# Patient Record
Sex: MALE | Race: WHITE | NOT HISPANIC OR LATINO
[De-identification: names, ages, dates, MRNs, and addresses within clinical notes are randomized per-mention and may not be internally consistent; named-entity substitution may affect disease eponyms.]

---

## 2017-01-04 ENCOUNTER — APPOINTMENT (OUTPATIENT)
Dept: INTERNAL MEDICINE | Facility: CLINIC | Age: 77
End: 2017-01-04

## 2017-03-08 ENCOUNTER — APPOINTMENT (OUTPATIENT)
Dept: ORTHOPEDIC SURGERY | Facility: CLINIC | Age: 77
End: 2017-03-08

## 2017-03-08 VITALS
WEIGHT: 170 LBS | HEIGHT: 69 IN | BODY MASS INDEX: 25.18 KG/M2 | HEART RATE: 66 BPM | DIASTOLIC BLOOD PRESSURE: 95 MMHG | SYSTOLIC BLOOD PRESSURE: 145 MMHG

## 2017-03-08 DIAGNOSIS — M70.62 TROCHANTERIC BURSITIS, LEFT HIP: ICD-10-CM

## 2017-03-09 ENCOUNTER — APPOINTMENT (OUTPATIENT)
Dept: MRI IMAGING | Facility: CLINIC | Age: 77
End: 2017-03-09

## 2017-03-09 ENCOUNTER — OUTPATIENT (OUTPATIENT)
Dept: OUTPATIENT SERVICES | Facility: HOSPITAL | Age: 77
LOS: 1 days | End: 2017-03-09
Payer: MEDICARE

## 2017-03-09 DIAGNOSIS — M48.06 SPINAL STENOSIS, LUMBAR REGION: ICD-10-CM

## 2017-03-09 PROCEDURE — 72148 MRI LUMBAR SPINE W/O DYE: CPT

## 2017-03-13 ENCOUNTER — APPOINTMENT (OUTPATIENT)
Dept: ORTHOPEDIC SURGERY | Facility: CLINIC | Age: 77
End: 2017-03-13

## 2017-03-13 VITALS
DIASTOLIC BLOOD PRESSURE: 88 MMHG | BODY MASS INDEX: 25.18 KG/M2 | HEART RATE: 62 BPM | SYSTOLIC BLOOD PRESSURE: 133 MMHG | HEIGHT: 69 IN | WEIGHT: 170 LBS

## 2017-03-21 ENCOUNTER — APPOINTMENT (OUTPATIENT)
Dept: ORTHOPEDIC SURGERY | Facility: HOSPITAL | Age: 77
End: 2017-03-21

## 2017-03-21 ENCOUNTER — OUTPATIENT (OUTPATIENT)
Dept: OUTPATIENT SERVICES | Facility: HOSPITAL | Age: 77
LOS: 1 days | End: 2017-03-21
Payer: MEDICARE

## 2017-03-21 DIAGNOSIS — M51.36 OTHER INTERVERTEBRAL DISC DEGENERATION, LUMBAR REGION: ICD-10-CM

## 2017-03-21 PROCEDURE — 77003 FLUOROGUIDE FOR SPINE INJECT: CPT

## 2017-03-21 PROCEDURE — 64483 NJX AA&/STRD TFRM EPI L/S 1: CPT | Mod: LT

## 2017-03-21 PROCEDURE — 64483 NJX AA&/STRD TFRM EPI L/S 1: CPT

## 2017-03-21 PROCEDURE — 64484 NJX AA&/STRD TFRM EPI L/S EA: CPT

## 2017-04-25 ENCOUNTER — APPOINTMENT (OUTPATIENT)
Dept: INTERNAL MEDICINE | Facility: CLINIC | Age: 77
End: 2017-04-25

## 2017-04-25 VITALS
TEMPERATURE: 97.8 F | DIASTOLIC BLOOD PRESSURE: 90 MMHG | WEIGHT: 165 LBS | SYSTOLIC BLOOD PRESSURE: 140 MMHG | BODY MASS INDEX: 24.37 KG/M2

## 2017-04-25 RX ORDER — GABAPENTIN 100 MG/1
100 CAPSULE ORAL
Qty: 30 | Refills: 2 | Status: DISCONTINUED | COMMUNITY
Start: 2017-03-13 | End: 2017-04-25

## 2017-06-05 ENCOUNTER — LABORATORY RESULT (OUTPATIENT)
Age: 77
End: 2017-06-05

## 2017-06-05 ENCOUNTER — APPOINTMENT (OUTPATIENT)
Dept: INTERNAL MEDICINE | Facility: CLINIC | Age: 77
End: 2017-06-05

## 2017-06-05 VITALS — SYSTOLIC BLOOD PRESSURE: 120 MMHG | DIASTOLIC BLOOD PRESSURE: 64 MMHG

## 2017-06-05 VITALS
SYSTOLIC BLOOD PRESSURE: 136 MMHG | HEIGHT: 69 IN | DIASTOLIC BLOOD PRESSURE: 64 MMHG | WEIGHT: 166 LBS | BODY MASS INDEX: 24.59 KG/M2

## 2017-06-06 ENCOUNTER — NON-APPOINTMENT (OUTPATIENT)
Age: 77
End: 2017-06-06

## 2017-07-03 ENCOUNTER — RX RENEWAL (OUTPATIENT)
Age: 77
End: 2017-07-03

## 2017-07-03 ENCOUNTER — MEDICATION RENEWAL (OUTPATIENT)
Age: 77
End: 2017-07-03

## 2017-07-06 LAB
ALBUMIN SERPL ELPH-MCNC: 3.9 G/DL
ALP BLD-CCNC: 42 U/L
ALT SERPL-CCNC: 12 U/L
ANION GAP SERPL CALC-SCNC: 12 MMOL/L
APPEARANCE: CLEAR
AST SERPL-CCNC: 26 U/L
BACTERIA: NEGATIVE
BASOPHILS # BLD AUTO: 0.02 K/UL
BASOPHILS NFR BLD AUTO: 0.3 %
BILIRUB SERPL-MCNC: 0.7 MG/DL
BILIRUBIN URINE: NEGATIVE
BLOOD URINE: NEGATIVE
BUN SERPL-MCNC: 22 MG/DL
CALCIUM SERPL-MCNC: 9.6 MG/DL
CHLORIDE SERPL-SCNC: 106 MMOL/L
CHOLEST SERPL-MCNC: 199 MG/DL
CHOLEST/HDLC SERPL: 2.4 RATIO
CO2 SERPL-SCNC: 23 MMOL/L
COLOR: YELLOW
CREAT SERPL-MCNC: 0.97 MG/DL
EOSINOPHIL # BLD AUTO: 0.14 K/UL
EOSINOPHIL NFR BLD AUTO: 2 %
GLUCOSE QUALITATIVE U: NORMAL MG/DL
GLUCOSE SERPL-MCNC: 90 MG/DL
HCT VFR BLD CALC: 41.6 %
HDLC SERPL-MCNC: 84 MG/DL
HGB BLD-MCNC: 13.2 G/DL
HYALINE CASTS: 0 /LPF
IMM GRANULOCYTES NFR BLD AUTO: 0.1 %
KETONES URINE: NEGATIVE
LDLC SERPL CALC-MCNC: 106 MG/DL
LDLC SERPL DIRECT ASSAY-MCNC: 105 MG/DL
LEUKOCYTE ESTERASE URINE: NEGATIVE
LYMPHOCYTES # BLD AUTO: 3.39 K/UL
LYMPHOCYTES NFR BLD AUTO: 47.3 %
MAN DIFF?: NORMAL
MCHC RBC-ENTMCNC: 28.8 PG
MCHC RBC-ENTMCNC: 31.7 GM/DL
MCV RBC AUTO: 90.6 FL
MICROSCOPIC-UA: NORMAL
MONOCYTES # BLD AUTO: 0.52 K/UL
MONOCYTES NFR BLD AUTO: 7.3 %
NEUTROPHILS # BLD AUTO: 3.08 K/UL
NEUTROPHILS NFR BLD AUTO: 43 %
NITRITE URINE: NEGATIVE
PH URINE: 7
PLATELET # BLD AUTO: 189 K/UL
POTASSIUM SERPL-SCNC: 4.9 MMOL/L
PROT SERPL-MCNC: 6.6 G/DL
PROTEIN URINE: NEGATIVE MG/DL
RBC # BLD: 4.59 M/UL
RBC # FLD: 16 %
RED BLOOD CELLS URINE: 3 /HPF
SODIUM SERPL-SCNC: 141 MMOL/L
SPECIFIC GRAVITY URINE: 1.02
SQUAMOUS EPITHELIAL CELLS: 0 /HPF
T3 SERPL-MCNC: 95 NG/DL
T3RU NFR SERPL: 1.02 INDEX
T4 FREE SERPL-MCNC: 1.3 NG/DL
T4 SERPL-MCNC: 7.1 UG/DL
TRIGL SERPL-MCNC: 44 MG/DL
TSH SERPL-ACNC: 1.42 UIU/ML
UROBILINOGEN URINE: NORMAL MG/DL
WBC # FLD AUTO: 7.16 K/UL
WHITE BLOOD CELLS URINE: 0 /HPF

## 2017-07-12 ENCOUNTER — APPOINTMENT (OUTPATIENT)
Dept: ORTHOPEDIC SURGERY | Facility: CLINIC | Age: 77
End: 2017-07-12

## 2017-07-12 VITALS
SYSTOLIC BLOOD PRESSURE: 121 MMHG | HEART RATE: 61 BPM | BODY MASS INDEX: 24.59 KG/M2 | HEIGHT: 69 IN | WEIGHT: 166 LBS | DIASTOLIC BLOOD PRESSURE: 69 MMHG

## 2017-07-18 ENCOUNTER — APPOINTMENT (OUTPATIENT)
Dept: ORTHOPEDIC SURGERY | Facility: HOSPITAL | Age: 77
End: 2017-07-18

## 2017-07-18 ENCOUNTER — OUTPATIENT (OUTPATIENT)
Dept: OUTPATIENT SERVICES | Facility: HOSPITAL | Age: 77
LOS: 1 days | End: 2017-07-18
Payer: MEDICARE

## 2017-07-18 DIAGNOSIS — M54.16 RADICULOPATHY, LUMBAR REGION: ICD-10-CM

## 2017-07-18 DIAGNOSIS — M48.06 SPINAL STENOSIS, LUMBAR REGION: ICD-10-CM

## 2017-07-18 DIAGNOSIS — M51.36 OTHER INTERVERTEBRAL DISC DEGENERATION, LUMBAR REGION: ICD-10-CM

## 2017-07-18 PROCEDURE — 77003 FLUOROGUIDE FOR SPINE INJECT: CPT

## 2017-07-18 PROCEDURE — 64483 NJX AA&/STRD TFRM EPI L/S 1: CPT | Mod: LT

## 2017-07-18 PROCEDURE — 64484 NJX AA&/STRD TFRM EPI L/S EA: CPT

## 2017-07-18 PROCEDURE — 64484 NJX AA&/STRD TFRM EPI L/S EA: CPT | Mod: LT

## 2017-07-18 PROCEDURE — 64483 NJX AA&/STRD TFRM EPI L/S 1: CPT

## 2017-09-24 ENCOUNTER — MEDICATION RENEWAL (OUTPATIENT)
Age: 77
End: 2017-09-24

## 2017-10-24 ENCOUNTER — APPOINTMENT (OUTPATIENT)
Dept: CARDIOLOGY | Facility: CLINIC | Age: 77
End: 2017-10-24
Payer: MEDICARE

## 2017-10-24 PROCEDURE — G0008: CPT

## 2017-10-24 PROCEDURE — 90662 IIV NO PRSV INCREASED AG IM: CPT

## 2017-12-18 ENCOUNTER — APPOINTMENT (OUTPATIENT)
Dept: INTERNAL MEDICINE | Facility: CLINIC | Age: 77
End: 2017-12-18

## 2017-12-21 ENCOUNTER — APPOINTMENT (OUTPATIENT)
Dept: CARDIOLOGY | Facility: CLINIC | Age: 77
End: 2017-12-21
Payer: MEDICARE

## 2017-12-21 ENCOUNTER — LABORATORY RESULT (OUTPATIENT)
Age: 77
End: 2017-12-21

## 2017-12-21 ENCOUNTER — NON-APPOINTMENT (OUTPATIENT)
Age: 77
End: 2017-12-21

## 2017-12-21 VITALS — SYSTOLIC BLOOD PRESSURE: 124 MMHG | DIASTOLIC BLOOD PRESSURE: 70 MMHG

## 2017-12-21 VITALS
BODY MASS INDEX: 24.37 KG/M2 | WEIGHT: 165 LBS | SYSTOLIC BLOOD PRESSURE: 142 MMHG | DIASTOLIC BLOOD PRESSURE: 78 MMHG | HEART RATE: 53 BPM

## 2017-12-21 VITALS
SYSTOLIC BLOOD PRESSURE: 137 MMHG | BODY MASS INDEX: 24.44 KG/M2 | WEIGHT: 165 LBS | DIASTOLIC BLOOD PRESSURE: 94 MMHG | HEIGHT: 69 IN

## 2017-12-21 PROCEDURE — 99214 OFFICE O/P EST MOD 30 MIN: CPT

## 2017-12-21 PROCEDURE — 93000 ELECTROCARDIOGRAM COMPLETE: CPT

## 2017-12-21 PROCEDURE — 36415 COLL VENOUS BLD VENIPUNCTURE: CPT

## 2017-12-21 PROCEDURE — 93306 TTE W/DOPPLER COMPLETE: CPT

## 2017-12-21 PROCEDURE — 93040 RHYTHM ECG WITH REPORT: CPT | Mod: 59

## 2017-12-25 ENCOUNTER — NON-APPOINTMENT (OUTPATIENT)
Age: 77
End: 2017-12-25

## 2018-01-04 LAB
ALBUMIN SERPL ELPH-MCNC: 3.7 G/DL
ALP BLD-CCNC: 46 U/L
ALT SERPL-CCNC: 20 U/L
ANION GAP SERPL CALC-SCNC: 12 MMOL/L
AST SERPL-CCNC: 29 U/L
BILIRUB SERPL-MCNC: 0.8 MG/DL
BUN SERPL-MCNC: 24 MG/DL
CALCIUM SERPL-MCNC: 9.5 MG/DL
CHLORIDE SERPL-SCNC: 102 MMOL/L
CHOLEST SERPL-MCNC: 183 MG/DL
CHOLEST/HDLC SERPL: 2.2 RATIO
CO2 SERPL-SCNC: 26 MMOL/L
CREAT SERPL-MCNC: 0.98 MG/DL
GLUCOSE SERPL-MCNC: 90 MG/DL
HDLC SERPL-MCNC: 83 MG/DL
LDLC SERPL CALC-MCNC: 92 MG/DL
LDLC SERPL DIRECT ASSAY-MCNC: 106 MG/DL
MAGNESIUM SERPL-MCNC: 1.8 MG/DL
POTASSIUM SERPL-SCNC: 4.5 MMOL/L
PROT SERPL-MCNC: 7 G/DL
SODIUM SERPL-SCNC: 140 MMOL/L
T3 SERPL-MCNC: 91 NG/DL
T3RU NFR SERPL: 1.05 INDEX
T4 FREE SERPL-MCNC: 1.3 NG/DL
T4 SERPL-MCNC: 7.2 UG/DL
TRIGL SERPL-MCNC: 39 MG/DL
TSH SERPL-ACNC: 0.94 UIU/ML

## 2018-01-15 ENCOUNTER — APPOINTMENT (OUTPATIENT)
Dept: CARDIOLOGY | Facility: CLINIC | Age: 78
End: 2018-01-15
Payer: MEDICARE

## 2018-01-15 VITALS — WEIGHT: 166 LBS | TEMPERATURE: 97.1 F | BODY MASS INDEX: 24.51 KG/M2 | OXYGEN SATURATION: 97 % | HEART RATE: 60 BPM

## 2018-01-15 VITALS — DIASTOLIC BLOOD PRESSURE: 60 MMHG | SYSTOLIC BLOOD PRESSURE: 140 MMHG

## 2018-01-15 PROCEDURE — 99213 OFFICE O/P EST LOW 20 MIN: CPT

## 2018-01-18 LAB — BACTERIA THROAT CULT: NORMAL

## 2018-05-07 ENCOUNTER — APPOINTMENT (OUTPATIENT)
Dept: ORTHOPEDIC SURGERY | Facility: CLINIC | Age: 78
End: 2018-05-07
Payer: MEDICARE

## 2018-05-07 VITALS
HEART RATE: 67 BPM | SYSTOLIC BLOOD PRESSURE: 147 MMHG | BODY MASS INDEX: 25.03 KG/M2 | DIASTOLIC BLOOD PRESSURE: 87 MMHG | HEIGHT: 69 IN | WEIGHT: 169 LBS

## 2018-05-07 DIAGNOSIS — M48.061 SPINAL STENOSIS, LUMBAR REGION WITHOUT NEUROGENIC CLAUDICATION: ICD-10-CM

## 2018-05-07 PROCEDURE — 99214 OFFICE O/P EST MOD 30 MIN: CPT

## 2018-05-07 PROCEDURE — 72110 X-RAY EXAM L-2 SPINE 4/>VWS: CPT

## 2018-05-07 PROCEDURE — 72170 X-RAY EXAM OF PELVIS: CPT | Mod: 59

## 2018-05-08 ENCOUNTER — OUTPATIENT (OUTPATIENT)
Dept: OUTPATIENT SERVICES | Facility: HOSPITAL | Age: 78
LOS: 1 days | End: 2018-05-08
Payer: MEDICARE

## 2018-05-08 ENCOUNTER — APPOINTMENT (OUTPATIENT)
Dept: ORTHOPEDIC SURGERY | Facility: HOSPITAL | Age: 78
End: 2018-05-08

## 2018-05-08 DIAGNOSIS — M48.061 SPINAL STENOSIS, LUMBAR REGION WITHOUT NEUROGENIC CLAUDICATION: ICD-10-CM

## 2018-05-08 DIAGNOSIS — M54.16 RADICULOPATHY, LUMBAR REGION: ICD-10-CM

## 2018-05-08 DIAGNOSIS — M51.36 OTHER INTERVERTEBRAL DISC DEGENERATION, LUMBAR REGION: ICD-10-CM

## 2018-05-08 PROCEDURE — 64484 NJX AA&/STRD TFRM EPI L/S EA: CPT

## 2018-05-08 PROCEDURE — 64484 NJX AA&/STRD TFRM EPI L/S EA: CPT | Mod: LT

## 2018-05-08 PROCEDURE — 64483 NJX AA&/STRD TFRM EPI L/S 1: CPT

## 2018-05-08 PROCEDURE — 64483 NJX AA&/STRD TFRM EPI L/S 1: CPT | Mod: LT

## 2018-05-08 PROCEDURE — 77003 FLUOROGUIDE FOR SPINE INJECT: CPT

## 2018-06-18 ENCOUNTER — NON-APPOINTMENT (OUTPATIENT)
Age: 78
End: 2018-06-18

## 2018-06-18 ENCOUNTER — APPOINTMENT (OUTPATIENT)
Dept: CARDIOLOGY | Facility: CLINIC | Age: 78
End: 2018-06-18
Payer: MEDICARE

## 2018-06-18 VITALS
HEART RATE: 54 BPM | SYSTOLIC BLOOD PRESSURE: 140 MMHG | DIASTOLIC BLOOD PRESSURE: 80 MMHG | WEIGHT: 166 LBS | BODY MASS INDEX: 24.51 KG/M2 | OXYGEN SATURATION: 99 %

## 2018-06-18 VITALS — DIASTOLIC BLOOD PRESSURE: 76 MMHG | SYSTOLIC BLOOD PRESSURE: 158 MMHG

## 2018-06-18 VITALS — DIASTOLIC BLOOD PRESSURE: 62 MMHG | SYSTOLIC BLOOD PRESSURE: 156 MMHG

## 2018-06-18 PROCEDURE — 93306 TTE W/DOPPLER COMPLETE: CPT

## 2018-06-18 PROCEDURE — 99214 OFFICE O/P EST MOD 30 MIN: CPT | Mod: 25

## 2018-06-18 PROCEDURE — 36415 COLL VENOUS BLD VENIPUNCTURE: CPT

## 2018-06-18 PROCEDURE — 93000 ELECTROCARDIOGRAM COMPLETE: CPT

## 2018-06-18 PROCEDURE — 93040 RHYTHM ECG WITH REPORT: CPT | Mod: 59

## 2018-06-18 RX ORDER — AMOXICILLIN 500 MG/1
500 CAPSULE ORAL
Qty: 28 | Refills: 0 | Status: COMPLETED | COMMUNITY
Start: 2017-06-20 | End: 2018-06-18

## 2018-06-18 RX ORDER — AMOXICILLIN AND CLAVULANATE POTASSIUM 875; 125 MG/1; MG/1
875-125 TABLET, COATED ORAL
Qty: 14 | Refills: 1 | Status: DISCONTINUED | COMMUNITY
Start: 2017-04-25 | End: 2018-06-18

## 2018-06-18 RX ORDER — AMOXICILLIN AND CLAVULANATE POTASSIUM 875; 125 MG/1; MG/1
875-125 TABLET, COATED ORAL
Qty: 14 | Refills: 1 | Status: DISCONTINUED | COMMUNITY
Start: 2018-01-15 | End: 2018-06-18

## 2018-06-21 ENCOUNTER — NON-APPOINTMENT (OUTPATIENT)
Age: 78
End: 2018-06-21

## 2018-06-22 LAB
ALBUMIN SERPL ELPH-MCNC: 4.4 G/DL
ALP BLD-CCNC: 51 U/L
ALT SERPL-CCNC: 37 U/L
ANION GAP SERPL CALC-SCNC: 20 MMOL/L
AST SERPL-CCNC: 30 U/L
BILIRUB SERPL-MCNC: 0.7 MG/DL
BUN SERPL-MCNC: 23 MG/DL
CALCIUM SERPL-MCNC: 9.4 MG/DL
CHLORIDE SERPL-SCNC: 95 MMOL/L
CHOLEST SERPL-MCNC: 134 MG/DL
CHOLEST/HDLC SERPL: 3.6 RATIO
CO2 SERPL-SCNC: 25 MMOL/L
CREAT SERPL-MCNC: 1.31 MG/DL
GLUCOSE SERPL-MCNC: 240 MG/DL
HDLC SERPL-MCNC: 37 MG/DL
LDLC SERPL CALC-MCNC: 64 MG/DL
LDLC SERPL DIRECT ASSAY-MCNC: 79 MG/DL
POTASSIUM SERPL-SCNC: 3.8 MMOL/L
PROT SERPL-MCNC: 7.6 G/DL
SODIUM SERPL-SCNC: 140 MMOL/L
TRIGL SERPL-MCNC: 167 MG/DL

## 2018-06-23 ENCOUNTER — RECORD ABSTRACTING (OUTPATIENT)
Age: 78
End: 2018-06-23

## 2018-07-02 ENCOUNTER — APPOINTMENT (OUTPATIENT)
Dept: ORTHOPEDIC SURGERY | Facility: CLINIC | Age: 78
End: 2018-07-02
Payer: MEDICARE

## 2018-07-02 VITALS
BODY MASS INDEX: 24.59 KG/M2 | HEIGHT: 69 IN | DIASTOLIC BLOOD PRESSURE: 84 MMHG | HEART RATE: 53 BPM | WEIGHT: 166 LBS | SYSTOLIC BLOOD PRESSURE: 143 MMHG

## 2018-07-02 PROCEDURE — 99214 OFFICE O/P EST MOD 30 MIN: CPT | Mod: 25

## 2018-07-02 PROCEDURE — 96372 THER/PROPH/DIAG INJ SC/IM: CPT

## 2018-07-02 RX ADMIN — KETOROLAC TROMETHAMINE 0 MG/ML: 30 INJECTION, SOLUTION INTRAMUSCULAR; INTRAVENOUS at 00:00

## 2018-07-10 ENCOUNTER — APPOINTMENT (OUTPATIENT)
Dept: ORTHOPEDIC SURGERY | Facility: HOSPITAL | Age: 78
End: 2018-07-10

## 2018-07-10 ENCOUNTER — OUTPATIENT (OUTPATIENT)
Dept: OUTPATIENT SERVICES | Facility: HOSPITAL | Age: 78
LOS: 1 days | End: 2018-07-10
Payer: MEDICARE

## 2018-07-10 DIAGNOSIS — M54.16 RADICULOPATHY, LUMBAR REGION: ICD-10-CM

## 2018-07-10 DIAGNOSIS — M51.36 OTHER INTERVERTEBRAL DISC DEGENERATION, LUMBAR REGION: ICD-10-CM

## 2018-07-10 DIAGNOSIS — M48.061 SPINAL STENOSIS, LUMBAR REGION WITHOUT NEUROGENIC CLAUDICATION: ICD-10-CM

## 2018-07-10 PROCEDURE — 64483 NJX AA&/STRD TFRM EPI L/S 1: CPT | Mod: LT

## 2018-07-10 PROCEDURE — 64483 NJX AA&/STRD TFRM EPI L/S 1: CPT

## 2018-07-10 PROCEDURE — 77003 FLUOROGUIDE FOR SPINE INJECT: CPT

## 2018-07-10 PROCEDURE — 64484 NJX AA&/STRD TFRM EPI L/S EA: CPT

## 2018-07-10 PROCEDURE — 64484 NJX AA&/STRD TFRM EPI L/S EA: CPT | Mod: LT

## 2018-07-23 ENCOUNTER — NON-APPOINTMENT (OUTPATIENT)
Age: 78
End: 2018-07-23

## 2018-07-23 ENCOUNTER — APPOINTMENT (OUTPATIENT)
Dept: CARDIOLOGY | Facility: CLINIC | Age: 78
End: 2018-07-23
Payer: MEDICARE

## 2018-07-23 VITALS
SYSTOLIC BLOOD PRESSURE: 150 MMHG | DIASTOLIC BLOOD PRESSURE: 80 MMHG | OXYGEN SATURATION: 96 % | HEART RATE: 68 BPM | BODY MASS INDEX: 24.37 KG/M2 | WEIGHT: 165 LBS

## 2018-07-23 VITALS — DIASTOLIC BLOOD PRESSURE: 84 MMHG | SYSTOLIC BLOOD PRESSURE: 156 MMHG

## 2018-07-23 DIAGNOSIS — R73.9 HYPERGLYCEMIA, UNSPECIFIED: ICD-10-CM

## 2018-07-23 PROCEDURE — 93000 ELECTROCARDIOGRAM COMPLETE: CPT

## 2018-07-23 PROCEDURE — 99214 OFFICE O/P EST MOD 30 MIN: CPT

## 2018-07-23 PROCEDURE — 36415 COLL VENOUS BLD VENIPUNCTURE: CPT

## 2018-07-23 PROCEDURE — 93040 RHYTHM ECG WITH REPORT: CPT | Mod: 59

## 2018-07-23 RX ORDER — GABAPENTIN 100 MG/1
100 CAPSULE ORAL
Qty: 30 | Refills: 2 | Status: DISCONTINUED | COMMUNITY
Start: 2018-07-02 | End: 2018-07-23

## 2018-07-29 ENCOUNTER — NON-APPOINTMENT (OUTPATIENT)
Age: 78
End: 2018-07-29

## 2018-07-30 ENCOUNTER — MEDICATION RENEWAL (OUTPATIENT)
Age: 78
End: 2018-07-30

## 2018-08-11 LAB
ALBUMIN SERPL ELPH-MCNC: 4.3 G/DL
ALP BLD-CCNC: 42 U/L
ALT SERPL-CCNC: 18 U/L
ANION GAP SERPL CALC-SCNC: 14 MMOL/L
AST SERPL-CCNC: 29 U/L
BILIRUB SERPL-MCNC: 0.7 MG/DL
BUN SERPL-MCNC: 23 MG/DL
CALCIUM SERPL-MCNC: 9.6 MG/DL
CHLORIDE SERPL-SCNC: 102 MMOL/L
CO2 SERPL-SCNC: 25 MMOL/L
CREAT SERPL-MCNC: 1 MG/DL
GLUCOSE SERPL-MCNC: 57 MG/DL
HBA1C MFR BLD HPLC: 5.8 %
POTASSIUM SERPL-SCNC: 4.6 MMOL/L
PROT SERPL-MCNC: 6.8 G/DL
SODIUM SERPL-SCNC: 141 MMOL/L

## 2018-08-15 ENCOUNTER — APPOINTMENT (OUTPATIENT)
Dept: ORTHOPEDIC SURGERY | Facility: CLINIC | Age: 78
End: 2018-08-15
Payer: MEDICARE

## 2018-08-15 VITALS
WEIGHT: 165 LBS | HEIGHT: 69 IN | DIASTOLIC BLOOD PRESSURE: 94 MMHG | BODY MASS INDEX: 24.44 KG/M2 | SYSTOLIC BLOOD PRESSURE: 153 MMHG | HEART RATE: 60 BPM

## 2018-08-15 PROCEDURE — 99214 OFFICE O/P EST MOD 30 MIN: CPT

## 2018-08-20 ENCOUNTER — APPOINTMENT (OUTPATIENT)
Dept: ORTHOPEDIC SURGERY | Facility: CLINIC | Age: 78
End: 2018-08-20
Payer: MEDICARE

## 2018-08-20 VITALS
HEIGHT: 69 IN | HEART RATE: 65 BPM | DIASTOLIC BLOOD PRESSURE: 94 MMHG | SYSTOLIC BLOOD PRESSURE: 157 MMHG | BODY MASS INDEX: 24.44 KG/M2 | WEIGHT: 165 LBS

## 2018-08-20 DIAGNOSIS — M51.36 OTHER INTERVERTEBRAL DISC DEGENERATION, LUMBAR REGION: ICD-10-CM

## 2018-08-20 DIAGNOSIS — M48.061 SPINAL STENOSIS, LUMBAR REGION WITHOUT NEUROGENIC CLAUDICATION: ICD-10-CM

## 2018-08-20 PROCEDURE — 99214 OFFICE O/P EST MOD 30 MIN: CPT

## 2018-08-21 ENCOUNTER — APPOINTMENT (OUTPATIENT)
Dept: CARDIOLOGY | Facility: CLINIC | Age: 78
End: 2018-08-21
Payer: MEDICARE

## 2018-08-21 ENCOUNTER — NON-APPOINTMENT (OUTPATIENT)
Age: 78
End: 2018-08-21

## 2018-08-21 VITALS
DIASTOLIC BLOOD PRESSURE: 82 MMHG | BODY MASS INDEX: 24.59 KG/M2 | OXYGEN SATURATION: 97 % | WEIGHT: 166 LBS | SYSTOLIC BLOOD PRESSURE: 130 MMHG | TEMPERATURE: 98.1 F | HEIGHT: 69 IN | HEART RATE: 63 BPM

## 2018-08-21 PROCEDURE — 93000 ELECTROCARDIOGRAM COMPLETE: CPT

## 2018-08-21 PROCEDURE — 99214 OFFICE O/P EST MOD 30 MIN: CPT

## 2018-08-24 ENCOUNTER — NON-APPOINTMENT (OUTPATIENT)
Age: 78
End: 2018-08-24

## 2018-08-27 ENCOUNTER — APPOINTMENT (OUTPATIENT)
Dept: CARDIOLOGY | Facility: CLINIC | Age: 78
End: 2018-08-27

## 2018-08-28 ENCOUNTER — RESULT CHARGE (OUTPATIENT)
Age: 78
End: 2018-08-28

## 2018-08-29 ENCOUNTER — NON-APPOINTMENT (OUTPATIENT)
Age: 78
End: 2018-08-29

## 2018-09-01 ENCOUNTER — NON-APPOINTMENT (OUTPATIENT)
Age: 78
End: 2018-09-01

## 2018-09-06 ENCOUNTER — OUTPATIENT (OUTPATIENT)
Dept: OUTPATIENT SERVICES | Facility: HOSPITAL | Age: 78
LOS: 1 days | End: 2018-09-06
Payer: MEDICARE

## 2018-09-06 VITALS
OXYGEN SATURATION: 98 % | TEMPERATURE: 98 F | WEIGHT: 160.06 LBS | DIASTOLIC BLOOD PRESSURE: 85 MMHG | RESPIRATION RATE: 16 BRPM | HEIGHT: 69 IN | HEART RATE: 77 BPM | SYSTOLIC BLOOD PRESSURE: 148 MMHG

## 2018-09-06 DIAGNOSIS — Z90.89 ACQUIRED ABSENCE OF OTHER ORGANS: Chronic | ICD-10-CM

## 2018-09-06 DIAGNOSIS — M48.061 SPINAL STENOSIS, LUMBAR REGION WITHOUT NEUROGENIC CLAUDICATION: ICD-10-CM

## 2018-09-06 DIAGNOSIS — M54.16 RADICULOPATHY, LUMBAR REGION: ICD-10-CM

## 2018-09-06 DIAGNOSIS — Z90.49 ACQUIRED ABSENCE OF OTHER SPECIFIED PARTS OF DIGESTIVE TRACT: Chronic | ICD-10-CM

## 2018-09-06 DIAGNOSIS — Z96.643 PRESENCE OF ARTIFICIAL HIP JOINT, BILATERAL: Chronic | ICD-10-CM

## 2018-09-06 DIAGNOSIS — Z01.818 ENCOUNTER FOR OTHER PREPROCEDURAL EXAMINATION: ICD-10-CM

## 2018-09-06 DIAGNOSIS — M51.36 OTHER INTERVERTEBRAL DISC DEGENERATION, LUMBAR REGION: ICD-10-CM

## 2018-09-06 DIAGNOSIS — M51.9 UNSPECIFIED THORACIC, THORACOLUMBAR AND LUMBOSACRAL INTERVERTEBRAL DISC DISORDER: ICD-10-CM

## 2018-09-06 DIAGNOSIS — Z98.41 CATARACT EXTRACTION STATUS, RIGHT EYE: Chronic | ICD-10-CM

## 2018-09-06 LAB
ALBUMIN SERPL ELPH-MCNC: 4.2 G/DL — SIGNIFICANT CHANGE UP (ref 3.3–5)
ALP SERPL-CCNC: 58 U/L — SIGNIFICANT CHANGE UP (ref 30–120)
ALT FLD-CCNC: 27 U/L DA — SIGNIFICANT CHANGE UP (ref 10–60)
ANION GAP SERPL CALC-SCNC: 8 MMOL/L — SIGNIFICANT CHANGE UP (ref 5–17)
APTT BLD: 31.7 SEC — SIGNIFICANT CHANGE UP (ref 27.5–37.4)
AST SERPL-CCNC: 31 U/L — SIGNIFICANT CHANGE UP (ref 10–40)
BILIRUB SERPL-MCNC: 1.1 MG/DL — SIGNIFICANT CHANGE UP (ref 0.2–1.2)
BLD GP AB SCN SERPL QL: SIGNIFICANT CHANGE UP
BUN SERPL-MCNC: 20 MG/DL — SIGNIFICANT CHANGE UP (ref 7–23)
CALCIUM SERPL-MCNC: 10.2 MG/DL — SIGNIFICANT CHANGE UP (ref 8.4–10.5)
CHLORIDE SERPL-SCNC: 105 MMOL/L — SIGNIFICANT CHANGE UP (ref 96–108)
CO2 SERPL-SCNC: 28 MMOL/L — SIGNIFICANT CHANGE UP (ref 22–31)
CREAT SERPL-MCNC: 1.11 MG/DL — SIGNIFICANT CHANGE UP (ref 0.5–1.3)
GLUCOSE SERPL-MCNC: 106 MG/DL — HIGH (ref 70–99)
HCT VFR BLD CALC: 45.7 % — SIGNIFICANT CHANGE UP (ref 39–50)
HGB BLD-MCNC: 15 G/DL — SIGNIFICANT CHANGE UP (ref 13–17)
INR BLD: 1.01 RATIO — SIGNIFICANT CHANGE UP (ref 0.88–1.16)
MCHC RBC-ENTMCNC: 29.4 PG — SIGNIFICANT CHANGE UP (ref 27–34)
MCHC RBC-ENTMCNC: 32.8 GM/DL — SIGNIFICANT CHANGE UP (ref 32–36)
MCV RBC AUTO: 89.6 FL — SIGNIFICANT CHANGE UP (ref 80–100)
NRBC # BLD: 0 /100 WBCS — SIGNIFICANT CHANGE UP (ref 0–0)
PLATELET # BLD AUTO: 219 K/UL — SIGNIFICANT CHANGE UP (ref 150–400)
POTASSIUM SERPL-MCNC: 4.8 MMOL/L — SIGNIFICANT CHANGE UP (ref 3.5–5.3)
POTASSIUM SERPL-SCNC: 4.8 MMOL/L — SIGNIFICANT CHANGE UP (ref 3.5–5.3)
PROT SERPL-MCNC: 7.7 G/DL — SIGNIFICANT CHANGE UP (ref 6–8.3)
PROTHROM AB SERPL-ACNC: 11 SEC — SIGNIFICANT CHANGE UP (ref 9.8–12.7)
RBC # BLD: 5.1 M/UL — SIGNIFICANT CHANGE UP (ref 4.2–5.8)
RBC # FLD: 14.6 % — HIGH (ref 10.3–14.5)
SODIUM SERPL-SCNC: 141 MMOL/L — SIGNIFICANT CHANGE UP (ref 135–145)
WBC # BLD: 8.8 K/UL — SIGNIFICANT CHANGE UP (ref 3.8–10.5)
WBC # FLD AUTO: 8.8 K/UL — SIGNIFICANT CHANGE UP (ref 3.8–10.5)

## 2018-09-06 PROCEDURE — 93005 ELECTROCARDIOGRAM TRACING: CPT

## 2018-09-06 PROCEDURE — G0463: CPT

## 2018-09-06 PROCEDURE — 85027 COMPLETE CBC AUTOMATED: CPT

## 2018-09-06 PROCEDURE — 86900 BLOOD TYPING SEROLOGIC ABO: CPT

## 2018-09-06 PROCEDURE — 80053 COMPREHEN METABOLIC PANEL: CPT

## 2018-09-06 PROCEDURE — 85730 THROMBOPLASTIN TIME PARTIAL: CPT

## 2018-09-06 PROCEDURE — 93010 ELECTROCARDIOGRAM REPORT: CPT

## 2018-09-06 PROCEDURE — 85610 PROTHROMBIN TIME: CPT

## 2018-09-06 PROCEDURE — 86850 RBC ANTIBODY SCREEN: CPT

## 2018-09-06 PROCEDURE — 86901 BLOOD TYPING SEROLOGIC RH(D): CPT

## 2018-09-06 NOTE — H&P PST ADULT - NSANTHOSAYNRD_GEN_A_CORE
No. GREGORIA screening performed.  STOP BANG Legend: 0-2 = LOW Risk; 3-4 = INTERMEDIATE Risk; 5-8 = HIGH Risk

## 2018-09-06 NOTE — H&P PST ADULT - FAMILY HISTORY
Mother  Still living? No  Family history of lung cancer, Age at diagnosis: Age Unknown     Father  Still living? No  Family history of colon cancer, Age at diagnosis: Age Unknown     Child  Still living? Yes, Estimated age: 41-50  Family history of testicular cancer, Age at diagnosis: Age Unknown     Sibling  Still living? Yes, Estimated age: 51-60  Family history of breast cancer, Age at diagnosis: Age Unknown

## 2018-09-06 NOTE — H&P PST ADULT - PROBLEM SELECTOR PLAN 1
"LEFT lumbar L4-5 L5-S1 disectomy" on 9/20/18  Diagnostic testing performed  Pending cardiac clearance  Questions answered  Instructions reviewed and signed   Contact info given  Best wishes offered

## 2018-09-06 NOTE — H&P PST ADULT - PMH
Dyslipidemia    GERD (gastroesophageal reflux disease)    Hypertension    Intervertebral lumbar disc disorder  L4-5 L5-S1  Lumbar back pain    Spinal stenosis of lumbar region

## 2018-09-06 NOTE — H&P PST ADULT - NEGATIVE ENMT SYMPTOMS
no ear pain/no tinnitus/no hearing difficulty/no vertigo/no sinus symptoms/no nasal congestion/no nasal obstruction/no nasal discharge

## 2018-09-06 NOTE — H&P PST ADULT - HISTORY OF PRESENT ILLNESS
79 yo male presents to PST for scheduled LEFT lumbar L4-L5-S1 discectomy on 9/20/18 with Dakotah Guevara MD  Patient with lower left back pain radiating to posterior leg to foot.  Pain rates 9/10 with standing, uses cane for stability.

## 2018-09-06 NOTE — H&P PST ADULT - PSH
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

## 2018-09-06 NOTE — H&P PST ADULT - NEGATIVE NEUROLOGICAL SYMPTOMS
no difficulty walking/no loss of sensation/no headache/no weakness/no tremors/no vertigo/no paresthesias

## 2018-09-13 ENCOUNTER — NON-APPOINTMENT (OUTPATIENT)
Age: 78
End: 2018-09-13

## 2018-09-13 ENCOUNTER — APPOINTMENT (OUTPATIENT)
Dept: CARDIOLOGY | Facility: CLINIC | Age: 78
End: 2018-09-13
Payer: MEDICARE

## 2018-09-13 VITALS — BODY MASS INDEX: 24.51 KG/M2 | WEIGHT: 166 LBS | HEART RATE: 64 BPM | OXYGEN SATURATION: 98 %

## 2018-09-13 DIAGNOSIS — Z12.5 ENCOUNTER FOR SCREENING FOR MALIGNANT NEOPLASM OF PROSTATE: ICD-10-CM

## 2018-09-13 DIAGNOSIS — R94.2 ABNORMAL RESULTS OF PULMONARY FUNCTION STUDIES: ICD-10-CM

## 2018-09-13 DIAGNOSIS — Z87.09 PERSONAL HISTORY OF OTHER DISEASES OF THE RESPIRATORY SYSTEM: ICD-10-CM

## 2018-09-13 DIAGNOSIS — Z11.1 ENCOUNTER FOR SCREENING FOR RESPIRATORY TUBERCULOSIS: ICD-10-CM

## 2018-09-13 DIAGNOSIS — R10.32 LEFT LOWER QUADRANT PAIN: ICD-10-CM

## 2018-09-13 DIAGNOSIS — H81.10 BENIGN PAROXYSMAL VERTIGO, UNSPECIFIED EAR: ICD-10-CM

## 2018-09-13 PROCEDURE — 93000 ELECTROCARDIOGRAM COMPLETE: CPT

## 2018-09-13 PROCEDURE — 99214 OFFICE O/P EST MOD 30 MIN: CPT

## 2018-09-14 PROBLEM — M54.5 LOW BACK PAIN: Chronic | Status: ACTIVE | Noted: 2018-09-06

## 2018-09-14 PROBLEM — K21.9 GASTRO-ESOPHAGEAL REFLUX DISEASE WITHOUT ESOPHAGITIS: Chronic | Status: ACTIVE | Noted: 2018-09-06

## 2018-09-14 PROBLEM — E78.5 HYPERLIPIDEMIA, UNSPECIFIED: Chronic | Status: ACTIVE | Noted: 2018-09-06

## 2018-09-14 PROBLEM — M51.9 UNSPECIFIED THORACIC, THORACOLUMBAR AND LUMBOSACRAL INTERVERTEBRAL DISC DISORDER: Chronic | Status: ACTIVE | Noted: 2018-09-06

## 2018-09-14 PROBLEM — I10 ESSENTIAL (PRIMARY) HYPERTENSION: Chronic | Status: ACTIVE | Noted: 2018-09-06

## 2018-09-14 PROBLEM — M48.061 SPINAL STENOSIS, LUMBAR REGION WITHOUT NEUROGENIC CLAUDICATION: Chronic | Status: ACTIVE | Noted: 2018-09-06

## 2018-09-14 NOTE — PROVIDER CONTACT NOTE (OTHER) - ASSESSMENT
The Spine Pre-Operative Education packet was given to the patient on 8/20/18. The patient and I reviewed the information included in the packet. All his questions were answered and he gave a clear understanding of the instructions. He was advised to call the office at any time with further questions or concerns.

## 2018-09-15 ENCOUNTER — RESULT CHARGE (OUTPATIENT)
Age: 78
End: 2018-09-15

## 2018-09-16 PROBLEM — Z87.09 HISTORY OF PHARYNGITIS: Status: RESOLVED | Noted: 2018-01-15 | Resolved: 2018-09-16

## 2018-09-16 PROBLEM — H81.10 BPV (BENIGN POSITIONAL VERTIGO): Status: RESOLVED | Noted: 2017-12-21 | Resolved: 2018-09-16

## 2018-09-17 ENCOUNTER — APPOINTMENT (OUTPATIENT)
Dept: CARDIOLOGY | Facility: CLINIC | Age: 78
End: 2018-09-17
Payer: MEDICARE

## 2018-09-17 PROCEDURE — A9500: CPT | Mod: PD

## 2018-09-17 PROCEDURE — 93015 CV STRESS TEST SUPVJ I&R: CPT | Mod: PD

## 2018-09-17 PROCEDURE — 78452 HT MUSCLE IMAGE SPECT MULT: CPT | Mod: PD

## 2018-09-18 RX ORDER — SODIUM CHLORIDE 9 MG/ML
1000 INJECTION, SOLUTION INTRAVENOUS
Qty: 0 | Refills: 0 | Status: DISCONTINUED | OUTPATIENT
Start: 2018-09-20 | End: 2018-09-21

## 2018-09-18 RX ORDER — MAGNESIUM HYDROXIDE 400 MG/1
30 TABLET, CHEWABLE ORAL EVERY 12 HOURS
Qty: 0 | Refills: 0 | Status: DISCONTINUED | OUTPATIENT
Start: 2018-09-20 | End: 2018-09-21

## 2018-09-18 RX ORDER — ONDANSETRON 8 MG/1
4 TABLET, FILM COATED ORAL EVERY 6 HOURS
Qty: 0 | Refills: 0 | Status: DISCONTINUED | OUTPATIENT
Start: 2018-09-20 | End: 2018-09-21

## 2018-09-19 ENCOUNTER — TRANSCRIPTION ENCOUNTER (OUTPATIENT)
Age: 78
End: 2018-09-19

## 2018-09-19 RX ORDER — DOCUSATE SODIUM 100 MG
100 CAPSULE ORAL THREE TIMES A DAY
Qty: 0 | Refills: 0 | Status: DISCONTINUED | OUTPATIENT
Start: 2018-09-20 | End: 2018-09-21

## 2018-09-19 RX ORDER — MAGNESIUM HYDROXIDE 400 MG/1
30 TABLET, CHEWABLE ORAL EVERY 12 HOURS
Qty: 0 | Refills: 0 | Status: DISCONTINUED | OUTPATIENT
Start: 2018-09-20 | End: 2018-09-21

## 2018-09-19 RX ORDER — SODIUM CHLORIDE 9 MG/ML
1000 INJECTION, SOLUTION INTRAVENOUS
Qty: 0 | Refills: 0 | Status: DISCONTINUED | OUTPATIENT
Start: 2018-09-20 | End: 2018-09-21

## 2018-09-19 RX ORDER — SENNA PLUS 8.6 MG/1
2 TABLET ORAL AT BEDTIME
Qty: 0 | Refills: 0 | Status: DISCONTINUED | OUTPATIENT
Start: 2018-09-20 | End: 2018-09-21

## 2018-09-20 ENCOUNTER — INPATIENT (INPATIENT)
Facility: HOSPITAL | Age: 78
LOS: 0 days | Discharge: HOME | DRG: 520 | End: 2018-09-21
Attending: ORTHOPAEDIC SURGERY | Admitting: ORTHOPAEDIC SURGERY
Payer: COMMERCIAL

## 2018-09-20 ENCOUNTER — APPOINTMENT (OUTPATIENT)
Dept: ORTHOPEDIC SURGERY | Facility: HOSPITAL | Age: 78
End: 2018-09-20

## 2018-09-20 ENCOUNTER — RESULT REVIEW (OUTPATIENT)
Age: 78
End: 2018-09-20

## 2018-09-20 VITALS
HEART RATE: 72 BPM | RESPIRATION RATE: 10 BRPM | WEIGHT: 164.02 LBS | DIASTOLIC BLOOD PRESSURE: 77 MMHG | HEIGHT: 69 IN | SYSTOLIC BLOOD PRESSURE: 143 MMHG | OXYGEN SATURATION: 99 % | TEMPERATURE: 98 F

## 2018-09-20 DIAGNOSIS — M51.36 OTHER INTERVERTEBRAL DISC DEGENERATION, LUMBAR REGION: ICD-10-CM

## 2018-09-20 DIAGNOSIS — M48.061 SPINAL STENOSIS, LUMBAR REGION WITHOUT NEUROGENIC CLAUDICATION: ICD-10-CM

## 2018-09-20 DIAGNOSIS — Z98.41 CATARACT EXTRACTION STATUS, RIGHT EYE: Chronic | ICD-10-CM

## 2018-09-20 DIAGNOSIS — Z01.818 ENCOUNTER FOR OTHER PREPROCEDURAL EXAMINATION: ICD-10-CM

## 2018-09-20 DIAGNOSIS — Z90.49 ACQUIRED ABSENCE OF OTHER SPECIFIED PARTS OF DIGESTIVE TRACT: Chronic | ICD-10-CM

## 2018-09-20 DIAGNOSIS — M54.16 RADICULOPATHY, LUMBAR REGION: ICD-10-CM

## 2018-09-20 DIAGNOSIS — M54.5 LOW BACK PAIN: ICD-10-CM

## 2018-09-20 DIAGNOSIS — M48.06 SPINAL STENOSIS, LUMBAR REGION: ICD-10-CM

## 2018-09-20 DIAGNOSIS — I10 ESSENTIAL (PRIMARY) HYPERTENSION: ICD-10-CM

## 2018-09-20 DIAGNOSIS — E78.5 HYPERLIPIDEMIA, UNSPECIFIED: ICD-10-CM

## 2018-09-20 DIAGNOSIS — M51.9 UNSPECIFIED THORACIC, THORACOLUMBAR AND LUMBOSACRAL INTERVERTEBRAL DISC DISORDER: ICD-10-CM

## 2018-09-20 DIAGNOSIS — Z96.643 PRESENCE OF ARTIFICIAL HIP JOINT, BILATERAL: Chronic | ICD-10-CM

## 2018-09-20 DIAGNOSIS — Z90.89 ACQUIRED ABSENCE OF OTHER ORGANS: Chronic | ICD-10-CM

## 2018-09-20 DIAGNOSIS — Z48.89 ENCOUNTER FOR OTHER SPECIFIED SURGICAL AFTERCARE: ICD-10-CM

## 2018-09-20 LAB — ABO RH CONFIRMATION: SIGNIFICANT CHANGE UP

## 2018-09-20 PROCEDURE — 88304 TISSUE EXAM BY PATHOLOGIST: CPT | Mod: 26

## 2018-09-20 PROCEDURE — 63048 LAM FACETEC &FORAMOT EA ADDL: CPT | Mod: 82,LT

## 2018-09-20 PROCEDURE — 63267 EXCISE INTRSPINL LESION LMBR: CPT

## 2018-09-20 PROCEDURE — 88305 TISSUE EXAM BY PATHOLOGIST: CPT | Mod: 26

## 2018-09-20 PROCEDURE — 99223 1ST HOSP IP/OBS HIGH 75: CPT

## 2018-09-20 PROCEDURE — 63047 LAM FACETEC & FORAMOT LUMBAR: CPT | Mod: 82,LT

## 2018-09-20 PROCEDURE — 88311 DECALCIFY TISSUE: CPT | Mod: 26

## 2018-09-20 PROCEDURE — 63047 LAM FACETEC & FORAMOT LUMBAR: CPT | Mod: 59

## 2018-09-20 RX ORDER — CELECOXIB 200 MG/1
100 CAPSULE ORAL ONCE
Qty: 0 | Refills: 0 | Status: COMPLETED | OUTPATIENT
Start: 2018-09-20 | End: 2018-09-20

## 2018-09-20 RX ORDER — DOCUSATE SODIUM 100 MG
100 CAPSULE ORAL THREE TIMES A DAY
Qty: 0 | Refills: 0 | Status: DISCONTINUED | OUTPATIENT
Start: 2018-09-20 | End: 2018-09-20

## 2018-09-20 RX ORDER — RAMIPRIL 5 MG
1 CAPSULE ORAL
Qty: 0 | Refills: 0 | COMMUNITY

## 2018-09-20 RX ORDER — PSYLLIUM SEED (WITH DEXTROSE)
0 POWDER (GRAM) ORAL
Qty: 0 | Refills: 0 | COMMUNITY

## 2018-09-20 RX ORDER — SENNA PLUS 8.6 MG/1
2 TABLET ORAL AT BEDTIME
Qty: 0 | Refills: 0 | Status: DISCONTINUED | OUTPATIENT
Start: 2018-09-20 | End: 2018-09-20

## 2018-09-20 RX ORDER — AMLODIPINE BESYLATE 2.5 MG/1
1 TABLET ORAL
Qty: 0 | Refills: 0 | COMMUNITY

## 2018-09-20 RX ORDER — HYDROMORPHONE HYDROCHLORIDE 2 MG/ML
0.5 INJECTION INTRAMUSCULAR; INTRAVENOUS; SUBCUTANEOUS
Qty: 0 | Refills: 0 | Status: DISCONTINUED | OUTPATIENT
Start: 2018-09-20 | End: 2018-09-21

## 2018-09-20 RX ORDER — PANTOPRAZOLE SODIUM 20 MG/1
40 TABLET, DELAYED RELEASE ORAL
Qty: 0 | Refills: 0 | Status: DISCONTINUED | OUTPATIENT
Start: 2018-09-20 | End: 2018-09-21

## 2018-09-20 RX ORDER — AMLODIPINE BESYLATE 2.5 MG/1
5 TABLET ORAL DAILY
Qty: 0 | Refills: 0 | Status: DISCONTINUED | OUTPATIENT
Start: 2018-09-21 | End: 2018-09-21

## 2018-09-20 RX ORDER — CEFAZOLIN SODIUM 1 G
2000 VIAL (EA) INJECTION ONCE
Qty: 0 | Refills: 0 | Status: COMPLETED | OUTPATIENT
Start: 2018-09-20 | End: 2018-09-20

## 2018-09-20 RX ORDER — SODIUM CHLORIDE 9 MG/ML
1000 INJECTION, SOLUTION INTRAVENOUS
Qty: 0 | Refills: 0 | Status: DISCONTINUED | OUTPATIENT
Start: 2018-09-20 | End: 2018-09-21

## 2018-09-20 RX ORDER — LISINOPRIL 2.5 MG/1
20 TABLET ORAL DAILY
Qty: 0 | Refills: 0 | Status: DISCONTINUED | OUTPATIENT
Start: 2018-09-22 | End: 2018-09-21

## 2018-09-20 RX ORDER — ACETAMINOPHEN 500 MG
1000 TABLET ORAL EVERY 8 HOURS
Qty: 0 | Refills: 0 | Status: DISCONTINUED | OUTPATIENT
Start: 2018-09-21 | End: 2018-09-21

## 2018-09-20 RX ORDER — OXYCODONE HYDROCHLORIDE 5 MG/1
10 TABLET ORAL
Qty: 0 | Refills: 0 | Status: DISCONTINUED | OUTPATIENT
Start: 2018-09-20 | End: 2018-09-21

## 2018-09-20 RX ORDER — SIMVASTATIN 20 MG/1
1 TABLET, FILM COATED ORAL
Qty: 0 | Refills: 0 | COMMUNITY

## 2018-09-20 RX ORDER — APREPITANT 80 MG/1
40 CAPSULE ORAL ONCE
Qty: 0 | Refills: 0 | Status: COMPLETED | OUTPATIENT
Start: 2018-09-20 | End: 2018-09-20

## 2018-09-20 RX ORDER — CELECOXIB 200 MG/1
100 CAPSULE ORAL EVERY 12 HOURS
Qty: 0 | Refills: 0 | Status: DISCONTINUED | OUTPATIENT
Start: 2018-09-20 | End: 2018-09-21

## 2018-09-20 RX ORDER — ACETAMINOPHEN 500 MG
1000 TABLET ORAL EVERY 6 HOURS
Qty: 0 | Refills: 0 | Status: COMPLETED | OUTPATIENT
Start: 2018-09-20 | End: 2018-09-21

## 2018-09-20 RX ORDER — SIMVASTATIN 20 MG/1
20 TABLET, FILM COATED ORAL AT BEDTIME
Qty: 0 | Refills: 0 | Status: DISCONTINUED | OUTPATIENT
Start: 2018-09-20 | End: 2018-09-21

## 2018-09-20 RX ORDER — INFLUENZA VIRUS VACCINE 15; 15; 15; 15 UG/.5ML; UG/.5ML; UG/.5ML; UG/.5ML
0.5 SUSPENSION INTRAMUSCULAR ONCE
Qty: 0 | Refills: 0 | Status: COMPLETED | OUTPATIENT
Start: 2018-09-20 | End: 2018-09-21

## 2018-09-20 RX ORDER — OXYCODONE HYDROCHLORIDE 5 MG/1
5 TABLET ORAL
Qty: 0 | Refills: 0 | Status: DISCONTINUED | OUTPATIENT
Start: 2018-09-20 | End: 2018-09-21

## 2018-09-20 RX ORDER — CEFAZOLIN SODIUM 1 G
2000 VIAL (EA) INJECTION EVERY 8 HOURS
Qty: 0 | Refills: 0 | Status: COMPLETED | OUTPATIENT
Start: 2018-09-20 | End: 2018-09-21

## 2018-09-20 RX ORDER — PSYLLIUM SEED (WITH DEXTROSE)
1 POWDER (GRAM) ORAL DAILY
Qty: 0 | Refills: 0 | Status: DISCONTINUED | OUTPATIENT
Start: 2018-09-21 | End: 2018-09-21

## 2018-09-20 RX ADMIN — Medication 1000 MILLIGRAM(S): at 21:17

## 2018-09-20 RX ADMIN — APREPITANT 40 MILLIGRAM(S): 80 CAPSULE ORAL at 10:02

## 2018-09-20 RX ADMIN — SENNA PLUS 2 TABLET(S): 8.6 TABLET ORAL at 21:27

## 2018-09-20 RX ADMIN — CELECOXIB 100 MILLIGRAM(S): 200 CAPSULE ORAL at 22:20

## 2018-09-20 RX ADMIN — Medication 1000 MILLIGRAM(S): at 03:00

## 2018-09-20 RX ADMIN — CELECOXIB 100 MILLIGRAM(S): 200 CAPSULE ORAL at 10:02

## 2018-09-20 RX ADMIN — Medication 400 MILLIGRAM(S): at 20:17

## 2018-09-20 RX ADMIN — SODIUM CHLORIDE 100 MILLILITER(S): 9 INJECTION, SOLUTION INTRAVENOUS at 16:40

## 2018-09-20 RX ADMIN — CELECOXIB 100 MILLIGRAM(S): 200 CAPSULE ORAL at 21:26

## 2018-09-20 RX ADMIN — Medication 100 MILLIGRAM(S): at 21:27

## 2018-09-20 RX ADMIN — Medication 100 MILLIGRAM(S): at 21:26

## 2018-09-20 NOTE — CONSULT NOTE ADULT - SUBJECTIVE AND OBJECTIVE BOX
Patient is a 78y old  Male who presents with a chief complaint of "Dr Guevara is going to fix my herniated discs" (19 Sep 2018 16:01)  Patient with lower left back pain radiating to posterior leg to foot.  Pain rates 9/10 with standing, uses cane for stability.      HPI: Patient is seen and examined.    PAST MEDICAL & SURGICAL HISTORY:  Intervertebral lumbar disc disorder: L4-5 L5-S1  Spinal stenosis of lumbar region  GERD (gastroesophageal reflux disease)  Dyslipidemia  Lumbar back pain  Hypertension  History of tonsillectomy: 1948  History of cataract extraction, right: 2012  History of appendectomy: 1943  History of hip replacement, total, bilateral: right 2016  left 2006    MEDICATIONS  (STANDING):  influenza   Vaccine 0.5 milliLiter(s) IntraMuscular once  lactated ringers. 1000 milliLiter(s) (100 mL/Hr) IV Continuous <Continuous>  pantoprazole    Tablet 40 milliGRAM(s) Oral before breakfast  simvastatin 20 milliGRAM(s) Oral at bedtime    MEDICATIONS  (PRN):  HYDROmorphone  Injectable 0.5 milliGRAM(s) IV Push every 10 minutes PRN Moderate Pain (4 - 6)      Allergies    No Known Allergies    Intolerances    SOCIAL HISTORY:  Smoker:  YES / NO        PACK YEARS:                         WHEN QUIT?  ETOH use:  YES / NO               FREQUENCY / QUANTITY:  Ilicit Drug use:  YES / NO  Occupation:  Assisted device use (Cane / Walker):  Live with:      FAMILY HISTORY:  Family history of breast cancer (Sibling)  Family history of testicular cancer (Child)  Family history of colon cancer (Father)  Family history of lung cancer (Mother)      Vital Signs Last 24 Hrs  T(C): 36.6 (20 Sep 2018 09:57), Max: 36.6 (20 Sep 2018 09:57)  T(F): 97.8 (20 Sep 2018 09:57), Max: 97.8 (20 Sep 2018 09:57)  HR: 72 (20 Sep 2018 09:57) (72 - 72)  BP: 143/77 (20 Sep 2018 09:57) (143/77 - 143/77)  BP(mean): --  RR: 10 (20 Sep 2018 09:57) (10 - 10)  SpO2: 99% (20 Sep 2018 09:57) (99% - 99%) Patient is a 78y old  Male who presents with a chief complaint of "Dr Guevara is going to fix my herniated discs" (19 Sep 2018 16:01)  Patient with lower left back pain radiating to posterior leg to foot.  Pain rates 9/10 with standing, uses cane for stability.  minimal relief with pain meds. gets worse with walking    HPI: Patient is seen and examined.  pain is controlled.  PAST MEDICAL & SURGICAL HISTORY:  Intervertebral lumbar disc disorder: L4-5 L5-S1  Spinal stenosis of lumbar region  GERD (gastroesophageal reflux disease)  Dyslipidemia  Lumbar back pain  Hypertension  History of tonsillectomy: 1948  History of cataract extraction, right: 2012  History of appendectomy: 1943  History of hip replacement, total, bilateral: right 2016  left 2006    MEDICATIONS  (STANDING):  influenza   Vaccine 0.5 milliLiter(s) IntraMuscular once  lactated ringers. 1000 milliLiter(s) (100 mL/Hr) IV Continuous <Continuous>  pantoprazole    Tablet 40 milliGRAM(s) Oral before breakfast  simvastatin 20 milliGRAM(s) Oral at bedtime    MEDICATIONS  (PRN):  HYDROmorphone  Injectable 0.5 milliGRAM(s) IV Push every 10 minutes PRN Moderate Pain (4 - 6)      Allergies    No Known Allergies    Intolerances    SOCIAL HISTORY:  Smoker:  NO        PACK YEARS:                         WHEN QUIT?  ETOH use:  NO               FREQUENCY / QUANTITY:  Ilicit Drug use:   NO        FAMILY HISTORY:  Family history of breast cancer (Sibling)  Family history of testicular cancer (Child)  Family history of colon cancer (Father)  Family history of lung cancer (Mother)      Vital Signs Last 24 Hrs  T(C): 36.6 (20 Sep 2018 09:57), Max: 36.6 (20 Sep 2018 09:57)  T(F): 97.8 (20 Sep 2018 09:57), Max: 97.8 (20 Sep 2018 09:57)  HR: 72 (20 Sep 2018 09:57) (72 - 72)  BP: 143/77 (20 Sep 2018 09:57) (143/77 - 143/77)  BP(mean): --  RR: 10 (20 Sep 2018 09:57) (10 - 10)  SpO2: 99% (20 Sep 2018 09:57) (99% - 99%)

## 2018-09-20 NOTE — BRIEF OPERATIVE NOTE - PROCEDURE
<<-----Click on this checkbox to enter Procedure Lumbar laminectomy  09/20/2018  Left L4-5, L5-S1 hemilaminectomies,  Excision of extra dural mass L5-S1 left  Active  JGRIESIN

## 2018-09-20 NOTE — CONSULT NOTE ADULT - ATTENDING COMMENTS
Plan of care was discuss with patient and family , all questions were answered, seems understand and in agreement.

## 2018-09-20 NOTE — BRIEF OPERATIVE NOTE - POST-OP DX
Spinal stenosis of lumbar region at multiple levels  09/20/2018  L4-5, L5-S1 ,  left extra dural mass L5-S1 left  Active  Minh Lindo

## 2018-09-21 ENCOUNTER — TRANSCRIPTION ENCOUNTER (OUTPATIENT)
Age: 78
End: 2018-09-21

## 2018-09-21 VITALS
OXYGEN SATURATION: 97 % | RESPIRATION RATE: 17 BRPM | SYSTOLIC BLOOD PRESSURE: 126 MMHG | TEMPERATURE: 98 F | DIASTOLIC BLOOD PRESSURE: 74 MMHG | HEART RATE: 57 BPM

## 2018-09-21 LAB
ANION GAP SERPL CALC-SCNC: 5 MMOL/L — SIGNIFICANT CHANGE UP (ref 5–17)
BASOPHILS # BLD AUTO: 0.01 K/UL — SIGNIFICANT CHANGE UP (ref 0–0.2)
BASOPHILS NFR BLD AUTO: 0.1 % — SIGNIFICANT CHANGE UP (ref 0–2)
BUN SERPL-MCNC: 15 MG/DL — SIGNIFICANT CHANGE UP (ref 7–23)
CALCIUM SERPL-MCNC: 9.1 MG/DL — SIGNIFICANT CHANGE UP (ref 8.4–10.5)
CHLORIDE SERPL-SCNC: 106 MMOL/L — SIGNIFICANT CHANGE UP (ref 96–108)
CO2 SERPL-SCNC: 28 MMOL/L — SIGNIFICANT CHANGE UP (ref 22–31)
CREAT SERPL-MCNC: 0.76 MG/DL — SIGNIFICANT CHANGE UP (ref 0.5–1.3)
EOSINOPHIL # BLD AUTO: 0 K/UL — SIGNIFICANT CHANGE UP (ref 0–0.5)
EOSINOPHIL NFR BLD AUTO: 0 % — SIGNIFICANT CHANGE UP (ref 0–6)
GLUCOSE SERPL-MCNC: 117 MG/DL — HIGH (ref 70–99)
HCT VFR BLD CALC: 38.7 % — LOW (ref 39–50)
HGB BLD-MCNC: 12.6 G/DL — LOW (ref 13–17)
IMM GRANULOCYTES NFR BLD AUTO: 0.5 % — SIGNIFICANT CHANGE UP (ref 0–1.5)
LYMPHOCYTES # BLD AUTO: 1.65 K/UL — SIGNIFICANT CHANGE UP (ref 1–3.3)
LYMPHOCYTES # BLD AUTO: 12.9 % — LOW (ref 13–44)
MCHC RBC-ENTMCNC: 28.8 PG — SIGNIFICANT CHANGE UP (ref 27–34)
MCHC RBC-ENTMCNC: 32.6 GM/DL — SIGNIFICANT CHANGE UP (ref 32–36)
MCV RBC AUTO: 88.4 FL — SIGNIFICANT CHANGE UP (ref 80–100)
MONOCYTES # BLD AUTO: 0.84 K/UL — SIGNIFICANT CHANGE UP (ref 0–0.9)
MONOCYTES NFR BLD AUTO: 6.6 % — SIGNIFICANT CHANGE UP (ref 2–14)
NEUTROPHILS # BLD AUTO: 10.24 K/UL — HIGH (ref 1.8–7.4)
NEUTROPHILS NFR BLD AUTO: 79.9 % — HIGH (ref 43–77)
NRBC # BLD: 0 /100 WBCS — SIGNIFICANT CHANGE UP (ref 0–0)
PLATELET # BLD AUTO: 165 K/UL — SIGNIFICANT CHANGE UP (ref 150–400)
POTASSIUM SERPL-MCNC: 4.7 MMOL/L — SIGNIFICANT CHANGE UP (ref 3.5–5.3)
POTASSIUM SERPL-SCNC: 4.7 MMOL/L — SIGNIFICANT CHANGE UP (ref 3.5–5.3)
RBC # BLD: 4.38 M/UL — SIGNIFICANT CHANGE UP (ref 4.2–5.8)
RBC # FLD: 14.2 % — SIGNIFICANT CHANGE UP (ref 10.3–14.5)
SODIUM SERPL-SCNC: 139 MMOL/L — SIGNIFICANT CHANGE UP (ref 135–145)
WBC # BLD: 12.81 K/UL — HIGH (ref 3.8–10.5)
WBC # FLD AUTO: 12.81 K/UL — HIGH (ref 3.8–10.5)

## 2018-09-21 PROCEDURE — G8979: CPT

## 2018-09-21 PROCEDURE — 97530 THERAPEUTIC ACTIVITIES: CPT

## 2018-09-21 PROCEDURE — G8978: CPT

## 2018-09-21 PROCEDURE — G8989: CPT

## 2018-09-21 PROCEDURE — 97165 OT EVAL LOW COMPLEX 30 MIN: CPT

## 2018-09-21 PROCEDURE — G8988: CPT

## 2018-09-21 PROCEDURE — 64714 REVISE LOW BACK NERVE(S): CPT

## 2018-09-21 PROCEDURE — G8987: CPT

## 2018-09-21 PROCEDURE — 36415 COLL VENOUS BLD VENIPUNCTURE: CPT

## 2018-09-21 PROCEDURE — 63267 EXCISE INTRSPINL LESION LMBR: CPT

## 2018-09-21 PROCEDURE — 88304 TISSUE EXAM BY PATHOLOGIST: CPT

## 2018-09-21 PROCEDURE — 85027 COMPLETE CBC AUTOMATED: CPT

## 2018-09-21 PROCEDURE — 76000 FLUOROSCOPY <1 HR PHYS/QHP: CPT

## 2018-09-21 PROCEDURE — 99232 SBSQ HOSP IP/OBS MODERATE 35: CPT

## 2018-09-21 PROCEDURE — 88305 TISSUE EXAM BY PATHOLOGIST: CPT

## 2018-09-21 PROCEDURE — 90686 IIV4 VACC NO PRSV 0.5 ML IM: CPT

## 2018-09-21 PROCEDURE — 97161 PT EVAL LOW COMPLEX 20 MIN: CPT

## 2018-09-21 PROCEDURE — G8980: CPT

## 2018-09-21 PROCEDURE — 95940 IONM IN OPERATNG ROOM 15 MIN: CPT

## 2018-09-21 PROCEDURE — C1889: CPT

## 2018-09-21 PROCEDURE — 80048 BASIC METABOLIC PNL TOTAL CA: CPT

## 2018-09-21 PROCEDURE — 97116 GAIT TRAINING THERAPY: CPT

## 2018-09-21 PROCEDURE — 88311 DECALCIFY TISSUE: CPT

## 2018-09-21 RX ORDER — ACETAMINOPHEN 500 MG
2 TABLET ORAL
Qty: 0 | Refills: 0 | COMMUNITY
Start: 2018-09-21

## 2018-09-21 RX ORDER — OXYCODONE HYDROCHLORIDE 5 MG/1
1 TABLET ORAL
Qty: 56 | Refills: 0 | OUTPATIENT
Start: 2018-09-21 | End: 2018-09-27

## 2018-09-21 RX ORDER — DOCUSATE SODIUM 100 MG
1 CAPSULE ORAL
Qty: 21 | Refills: 0 | OUTPATIENT
Start: 2018-09-21 | End: 2018-09-27

## 2018-09-21 RX ORDER — PANTOPRAZOLE SODIUM 20 MG/1
1 TABLET, DELAYED RELEASE ORAL
Qty: 0 | Refills: 0 | COMMUNITY

## 2018-09-21 RX ORDER — PANTOPRAZOLE SODIUM 20 MG/1
1 TABLET, DELAYED RELEASE ORAL
Qty: 30 | Refills: 0 | OUTPATIENT
Start: 2018-09-21 | End: 2018-10-20

## 2018-09-21 RX ORDER — CELECOXIB 200 MG/1
1 CAPSULE ORAL
Qty: 14 | Refills: 0 | OUTPATIENT
Start: 2018-09-21 | End: 2018-09-27

## 2018-09-21 RX ADMIN — INFLUENZA VIRUS VACCINE 0.5 MILLILITER(S): 15; 15; 15; 15 SUSPENSION INTRAMUSCULAR at 12:52

## 2018-09-21 RX ADMIN — Medication 1000 MILLIGRAM(S): at 12:52

## 2018-09-21 RX ADMIN — Medication 400 MILLIGRAM(S): at 07:49

## 2018-09-21 RX ADMIN — Medication 100 MILLIGRAM(S): at 05:59

## 2018-09-21 RX ADMIN — CELECOXIB 100 MILLIGRAM(S): 200 CAPSULE ORAL at 09:00

## 2018-09-21 RX ADMIN — Medication 400 MILLIGRAM(S): at 02:05

## 2018-09-21 RX ADMIN — Medication 100 MILLIGRAM(S): at 13:10

## 2018-09-21 RX ADMIN — Medication 1000 MILLIGRAM(S): at 12:54

## 2018-09-21 RX ADMIN — Medication 1000 MILLIGRAM(S): at 07:49

## 2018-09-21 RX ADMIN — PANTOPRAZOLE SODIUM 40 MILLIGRAM(S): 20 TABLET, DELAYED RELEASE ORAL at 06:00

## 2018-09-21 NOTE — PROGRESS NOTE ADULT - ASSESSMENT
Patient is 79 yo male presenting with     1.  S/P back surgery.  Continue with pain management, DVT proph, and wound care as per Ortho.  PT/OT  2. HLD.  Continue with statin  3. HTN.  Continue with home medications, and Monitor BP  Plan of care was discussed with patient in great details, All questions were answered to their satisfication.  Seems to understand, and in agreement

## 2018-09-21 NOTE — PROGRESS NOTE ADULT - SUBJECTIVE AND OBJECTIVE BOX
CC.  S/P Discectomy    HPI.  Patient reports pain is well controlled.  Offers no other complaints    denies any numbness, weakness, change in BM or urinary pattern      Constitutional: No fever, fatigue or weight loss.  Skin: No rash.  Eyes: No recent vision problems or eye pain.  ENT: No congestion, ear pain, or sore throat.  Endocrine: No thyroid problems.  Cardiovascular: No chest pain or palpation.  Respiratory: No cough, shortness of breath, congestion, or wheezing.  Gastrointestinal: No abdominal pain, nausea, vomiting, or diarrhea.  Genitourinary: No dysuria.  Musculoskeletal: No joint swelling.  Neurologic: No headache.    Vital Signs Last 24 Hrs  T(C): 36.5 (21 Sep 2018 07:09), Max: 36.7 (20 Sep 2018 22:07)  T(F): 97.7 (21 Sep 2018 07:09), Max: 98.1 (20 Sep 2018 22:07)  HR: 59 (21 Sep 2018 07:09) (54 - 82)  BP: 121/76 (21 Sep 2018 07:09) (103/65 - 143/77)  BP(mean): --  RR: 17 (21 Sep 2018 07:09) (10 - 20)  SpO2: 97% (21 Sep 2018 07:09) (95% - 99%)      PHYSICAL EXAM-  GENERAL: NAD, well-groomed, well-developed  HEAD:  Atraumatic, Normocephalic  EYES: EOMI, PERRLA, conjunctiva and sclera clear  NECK: Supple, No JVD, Normal thyroid  NERVOUS SYSTEM:  Alert & Oriented X3, Motor Strength 5/5 B/L upper and lower extremities; DTRs 2+ intact and symmetric  CHEST/LUNG: Clear to percussion bilaterally; No rales, rhonchi, wheezing, or rubs  HEART: Regular rate and rhythm; No murmurs, rubs, or gallops  ABDOMEN: Soft, Nontender, Nondistended; Bowel sounds present  EXTREMITIES:  2+ Peripheral Pulses, No clubbing, cyanosis, or edema  SKIN: No rashes or lesions    MEDICATIONS  (STANDING):  acetaminophen   Tablet .. 1000 milliGRAM(s) Oral every 8 hours  amLODIPine   Tablet 5 milliGRAM(s) Oral daily  celecoxib 100 milliGRAM(s) Oral every 12 hours  docusate sodium 100 milliGRAM(s) Oral three times a day  influenza   Vaccine 0.5 milliLiter(s) IntraMuscular once  lactated ringers. 1000 milliLiter(s) (100 mL/Hr) IV Continuous <Continuous>  lactated ringers. 1000 milliLiter(s) (100 mL/Hr) IV Continuous <Continuous>  lactated ringers. 1000 milliLiter(s) (75 mL/Hr) IV Continuous <Continuous>  pantoprazole    Tablet 40 milliGRAM(s) Oral before breakfast  psyllium Powder 1 Packet(s) Oral daily  senna 2 Tablet(s) Oral at bedtime  simvastatin 20 milliGRAM(s) Oral at bedtime    MEDICATIONS  (PRN):  HYDROmorphone  Injectable 0.5 milliGRAM(s) IV Push every 3 hours PRN Severe Pain (7 - 10)  magnesium hydroxide Suspension 30 milliLiter(s) Oral every 12 hours PRN Constipation  magnesium hydroxide Suspension 30 milliLiter(s) Oral every 12 hours PRN Constipation  ondansetron Injectable 4 milliGRAM(s) IV Push every 6 hours PRN Nausea  oxyCODONE    IR 5 milliGRAM(s) Oral every 3 hours PRN Mild Pain (1 - 3)  oxyCODONE    IR 10 milliGRAM(s) Oral every 3 hours PRN Moderate Pain (4 - 6)                            12.6   12.81 )-----------( 165      ( 21 Sep 2018 08:02 )             38.7     09-21    139  |  106  |  15  ----------------------------<  117<H>  4.7   |  28  |  0.76    Ca    9.1      21 Sep 2018 08:02              Imaging Personally Reviewed:     [x ] YES  [ ] NO    Consultant(s) Notes Reviewed:  [x ] YES  [ ] NO    Care Discussed with Consultants/Other Providers [x ] YES  [ ] NO

## 2018-09-21 NOTE — OCCUPATIONAL THERAPY INITIAL EVALUATION ADULT - RANGE OF MOTION EXAMINATION, UPPER EXTREMITY
bilateral UE Active ROM was WFL  (within functional limits) within spinal prec/bilateral UE Active ROM was WFL  (within functional limits)

## 2018-09-21 NOTE — DISCHARGE NOTE ADULT - CARE PLAN
Principal Discharge DX:	Intervertebral lumbar disc disorder  Goal:	Improve ambulation, ADLs and quality of life  Assessment and plan of treatment:	No heavy lifting. Do not lift more than 10 pounds.  Avoid twisting and bending over. Do NOT drive a car.  You may be driven in a car.  You may shower if the dressing is the clear plastic type or if you cover the existing dressing with plastic and tape to prevent it from getting wet.  Change dressing with dry, sterile gauze and tape if it becomes loose, wet or soiled.    Observe low back precautions as described by the Physical Therapist.  Walking is your best exercise.  It is best not to remain in one position for long periods such as long car rides. Call the doctor with questions, fevers, severe headache, bowel or bladder dysfunction, new numbness/weakness or new pain not relieved by medication.  Assessment and plan of treatment:	- Call your doctor if you experience:  • An increase in pain not controlled by pain medication or change in activity or  position.  • Temperature greater than 101° F.  • Redness, increased swelling or foul smelling drainage from or around the  incision.  • Numbness, tingling or a change in color or temperature of the operative leg.  • Call your doctor immediately if you experience chest pain, shortness of breath or calf pain.

## 2018-09-21 NOTE — DISCHARGE NOTE ADULT - PLAN OF CARE
Improve ambulation, ADLs and quality of life No heavy lifting. Do not lift more than 10 pounds.  Avoid twisting and bending over. Do NOT drive a car.  You may be driven in a car.  You may shower if the dressing is the clear plastic type or if you cover the existing dressing with plastic and tape to prevent it from getting wet.  Change dressing with dry, sterile gauze and tape if it becomes loose, wet or soiled.    Observe low back precautions as described by the Physical Therapist.  Walking is your best exercise.  It is best not to remain in one position for long periods such as long car rides. Call the doctor with questions, fevers, severe headache, bowel or bladder dysfunction, new numbness/weakness or new pain not relieved by medication. - Call your doctor if you experience:  • An increase in pain not controlled by pain medication or change in activity or  position.  • Temperature greater than 101° F.  • Redness, increased swelling or foul smelling drainage from or around the  incision.  • Numbness, tingling or a change in color or temperature of the operative leg.  • Call your doctor immediately if you experience chest pain, shortness of breath or calf pain.

## 2018-09-21 NOTE — OCCUPATIONAL THERAPY INITIAL EVALUATION ADULT - LEVEL OF INDEPENDENCE: SHOWER, REHAB EVAL
OT verbally reviewed & demonstrated safety and technique with entering stall shower once home & medically cleared to bathe. Pt aware of recommendation to have 2nd person present during showering.

## 2018-09-21 NOTE — OCCUPATIONAL THERAPY INITIAL EVALUATION ADULT - LEVEL OF INDEPENDENCE: DRESS LOWER BODY, OT EVAL
independent/pt not wearing socks (pt aware of spinal prec and able to maintain while dressing) Pt states he will ask spouse for assist as needed with socks, pt has reacher (declined hip kit)

## 2018-09-21 NOTE — DISCHARGE NOTE ADULT - CARE PROVIDER_API CALL
Dakotah Guevara (MD), Orthopaedic Surgery  [address] [address]  Phone: [phone]  Fax: [phone]

## 2018-09-21 NOTE — DISCHARGE NOTE ADULT - NS AS ACTIVITY OBS
Showering allowed/Do not make important decisions/Do not drive or operate machinery/No Heavy lifting/straining/Stairs allowed

## 2018-09-21 NOTE — DISCHARGE NOTE ADULT - PATIENT PORTAL LINK FT
You can access the [facility] Patient Portal, offered by [facility], by registering with the following website: [email or web address]

## 2018-09-21 NOTE — OCCUPATIONAL THERAPY INITIAL EVALUATION ADULT - ADDITIONAL COMMENTS
3 MARYAM 1 HR, 13 steps inside 1 HR 3 MARYAM 1 HR, 13 steps inside 1 HR, + stall shower, + RTS, reacher, RW, SAC

## 2018-09-21 NOTE — OCCUPATIONAL THERAPY INITIAL EVALUATION ADULT - ORIENTATION, REHAB EVAL
Pt educated by OT verbally re the role of OT  & provided pt with education folder including spinal education/precautions & caregiver guide pamphlet. Pt educated re d/c plan & DME needs (SW/case mgmt notified via sunrise) ./oriented to person, place, time and situation

## 2018-09-21 NOTE — PROGRESS NOTE ADULT - SUBJECTIVE AND OBJECTIVE BOX
Procedure:   Lumbar hemilaminectomy L4-L5, L5-S1 - and removal extradural mass  S: Pt without complaints. No SOB,CP, N/V. Tolerated Diet well.  No BM yet + flatus, No abdominal pain. Pain comfortable  on  Interval Rx.   acetaminophen   Tablet .. 1000 milliGRAM(s) Oral every 8 hours  acetaminophen  IVPB .. 1000 milliGRAM(s) IV Intermittent every 6 hours  celecoxib 100 milliGRAM(s) Oral every 12 hours  HYDROmorphone  Injectable 0.5 milliGRAM(s) IV Push every 3 hours PRN  ondansetron Injectable 4 milliGRAM(s) IV Push every 6 hours PRN  oxyCODONE    IR 5 milliGRAM(s) Oral every 3 hours PRN  oxyCODONE    IR 10 milliGRAM(s) Oral every 3 hours PRN    O: On exam, No Apparent Distress; Alert & Oriented   T(C): 36.5 (09-21-18 @ 07:09), Max: 36.7 (09-20-18 @ 22:07)  HR: 59 (09-21-18 @ 07:09) (54 - 82)  BP: 121/76 (09-21-18 @ 07:09) (103/65 - 143/77)  RR: 17 (09-21-18 @ 07:09) (10 - 20)  SpO2: 97% (09-21-18 @ 07:09) (95% - 99%)  Wt(kg): --    Lungs: BS clear bilat.  Heart: RRR no murmur  Abdomen soft; + BS; Benign exam    Lumbar: dressing small amt serosang drainage--redressed    Steristrips in place ;  NO active drainage no  dehiscence; No  cellutitis; Minimal soft tissue swelling  Neurologic: Has sensation bilat. feet & toes ;  Full AROM bilat feet & toes. EHL / AT  = Bilat: 5/5   Vascular: Feet toes warm, pink. DP = 2+. Calves soft ; w/o tenderness bilat..  Voiding    VTEP:  On Venodynes bilat  I&O's Detail    20 Sep 2018 07:01  -  21 Sep 2018 07:00  --------------------------------------------------------  IN:    lactated ringers.: 2450 mL  Total IN: 2450 mL    OUT:    Voided: 1050 mL  Total OUT: 1050 mL    Total NET: 1400 mL          Activity in PT Noted. Walked 50 ft. with walker   Internal Med. MD input noted    IMP: Stable from Ortho standpoint; Dx for other issues    PLAN: Cont. PT, OT, Venodynes & ankle pumps,   Anesthesia MD to f/u,   Plans per Int Med MD  Pain management  PRN  narcotics      Discharge planning ongoing for: Home with home care when deemed medically stable & cleared by PT/OT today Procedure:   Lumbar hemilaminectomy L4-L5, L5-S1 - and removal extradural mass  S: Pt without complaints. No SOB,CP, N/V. Tolerated Diet well.  No BM yet + flatus, No abdominal pain. Pain comfortable  on  Interval Rx.   acetaminophen   Tablet .. 1000 milliGRAM(s) Oral every 8 hours  acetaminophen  IVPB .. 1000 milliGRAM(s) IV Intermittent every 6 hours  celecoxib 100 milliGRAM(s) Oral every 12 hours  HYDROmorphone  Injectable 0.5 milliGRAM(s) IV Push every 3 hours PRN  ondansetron Injectable 4 milliGRAM(s) IV Push every 6 hours PRN  oxyCODONE    IR 5 milliGRAM(s) Oral every 3 hours PRN  oxyCODONE    IR 10 milliGRAM(s) Oral every 3 hours PRN    O: On exam, No Apparent Distress; Alert & Oriented   T(C): 36.5 (09-21-18 @ 07:09), Max: 36.7 (09-20-18 @ 22:07)  HR: 59 (09-21-18 @ 07:09) (54 - 82)  BP: 121/76 (09-21-18 @ 07:09) (103/65 - 143/77)  RR: 17 (09-21-18 @ 07:09) (10 - 20)  SpO2: 97% (09-21-18 @ 07:09) (95% - 99%)  Wt(kg): --    Lungs: BS clear bilat.  Heart: RRR no murmur  Abdomen soft; + BS; Benign exam    Lumbar: dressing small amt serosang drainage--redressed    Steristrips in place ;  NO active drainage no  dehiscence; No  cellutitis; Minimal soft tissue swelling  Neurologic: Has sensation bilat. feet & toes ;  Full AROM bilat feet & toes. EHL / AT  = Bilat: 5/5   Vascular: Feet toes warm, pink. DP = 2+. Calves soft ; w/o tenderness bilat..  Voiding    VTEP:  On Venodynes bilat  I&O's Detail    20 Sep 2018 07:01  -  21 Sep 2018 07:00  --------------------------------------------------------  IN:    lactated ringers.: 2450 mL  Total IN: 2450 mL    OUT:    Voided: 1050 mL  Total OUT: 1050 mL    Total NET: 1400 mL          Activity in PT Noted. Walked 50 ft. with walker     IMP: Stable from Ortho standpoint; Dx for other issues    PLAN: Cont. PT, OT, Venodynes & ankle pumps,   Anesthesia MD to f/u,   Plans per Int Med MD  Pain management  PRN  narcotics      Discharge planning ongoing for: Home with home care when deemed medically stable & cleared by PT/OT today

## 2018-09-21 NOTE — DISCHARGE NOTE ADULT - MEDICATION SUMMARY - MEDICATIONS TO TAKE
I will START or STAY ON the medications listed below when I get home from the hospital:    acetaminophen 500 mg oral tablet  -- 2 tab(s) by mouth every 8 hours  -- Indication: For pain    oxyCODONE 5 mg oral tablet  -- 1 tab(s) by mouth every 3 hours, As needed, Pain (1 - 3) MDD:8  -- Indication: For pain    ramipril 5 mg oral capsule  -- 1 cap(s) by mouth once a day  -- Indication: For HTN    simvastatin 20 mg oral tablet  -- 1 tab(s) by mouth once a day (at bedtime)  -- Indication: For HLD    amLODIPine 5 mg oral tablet  -- 1 tab(s) by mouth once a day  -- Indication: For HTN    Metamucil  -- orally once a day  -- Indication: For Stool softner     docusate sodium 100 mg oral capsule  -- 1 cap(s) by mouth 3 times a day  -- Indication: For constipation    pantoprazole 40 mg oral delayed release tablet  -- 1 tab(s) by mouth once a day  -- Indication: For gerd I will START or STAY ON the medications listed below when I get home from the hospital:    acetaminophen 500 mg oral tablet  -- 2 tab(s) by mouth every 8 hours  -- Indication: For pain    oxyCODONE 5 mg oral tablet  -- 1 tab(s) by mouth every 3 hours, As needed, Pain (1 - 3) MDD:8  -- Indication: For pain    ramipril 5 mg oral capsule  -- 1 cap(s) by mouth once a day  -- Indication: For HTN    simvastatin 20 mg oral tablet  -- 1 tab(s) by mouth once a day (at bedtime)  -- Indication: For HLD    amLODIPine 5 mg oral tablet  -- 1 tab(s) by mouth once a day  -- Indication: For HTN    Metamucil  -- orally once a day  -- Indication: For constipation    docusate sodium 100 mg oral capsule  -- 1 cap(s) by mouth 3 times a day  -- Indication: For contipation    pantoprazole 40 mg oral delayed release tablet  -- 1 tab(s) by mouth once a day  -- Indication: For gerd I will START or STAY ON the medications listed below when I get home from the hospital:    acetaminophen 500 mg oral tablet  -- 2 tab(s) by mouth every 8 hours for 2 to 3 weeks.  -- Indication: For pain    oxyCODONE 5 mg oral tablet  -- 1 tab(s) by mouth every 3 hours, As needed, Pain (1 - 3) MDD:8  -- Indication: For pain    ramipril 5 mg oral capsule  -- 1 cap(s) by mouth once a day  -- Indication: For hypertension    simvastatin 20 mg oral tablet  -- 1 tab(s) by mouth once a day (at bedtime)  -- Indication: For high cholesterol    amLODIPine 5 mg oral tablet  -- 1 tab(s) by mouth once a day  -- Indication: For hypertension    Metamucil  -- orally once a day  -- Indication: For constipation    docusate sodium 100 mg oral capsule  -- 1 cap(s) by mouth 3 times a day  -- Indication: For constipation    pantoprazole 40 mg oral delayed release tablet  -- 1 tab(s) by mouth once a day  -- Indication: For reflux I will START or STAY ON the medications listed below when I get home from the hospital:    acetaminophen 500 mg oral tablet  -- 2 tab(s) by mouth every 8 hours for 2 to 3 weeks.  -- Indication: For pain    celecoxib 100 mg oral capsule  -- 1 cap(s) by mouth every 12 hours  -- Indication: For pain    oxyCODONE 5 mg oral tablet  -- 1 tab(s) by mouth every 3 hours, As needed, Pain (1 - 3) MDD:8  -- Indication: For pain    ramipril 5 mg oral capsule  -- 1 cap(s) by mouth once a day  -- Indication: For hypertension    simvastatin 20 mg oral tablet  -- 1 tab(s) by mouth once a day (at bedtime)  -- Indication: For high cholesterol    amLODIPine 5 mg oral tablet  -- 1 tab(s) by mouth once a day  -- Indication: For hypertension    Metamucil  -- orally once a day  -- Indication: For constipation    docusate sodium 100 mg oral capsule  -- 1 cap(s) by mouth 3 times a day  -- Indication: For constipation    pantoprazole 40 mg oral delayed release tablet  -- 1 tab(s) by mouth once a day  -- Indication: For reflux I will START or STAY ON the medications listed below when I get home from the hospital:    celecoxib 100 mg oral capsule  -- 1 cap(s) by mouth every 12 hours  -- Indication: For pain management    acetaminophen 500 mg oral tablet  -- 2 tab(s) by mouth every 12 hours for 2 to 3 weeks.  -- Indication: For  pain management    oxyCODONE 5 mg oral tablet  -- 1 tab(s) by mouth every 3 hours, As needed, Pain (1 - 3) MDD:8  -- Indication: For pain managment    ramipril 5 mg oral capsule  -- 1 cap(s) by mouth once a day  -- Indication: For hypertension    simvastatin 20 mg oral tablet  -- 1 tab(s) by mouth once a day (at bedtime)  -- Indication: For high cholesterol    amLODIPine 5 mg oral tablet  -- 1 tab(s) by mouth once a day  -- Indication: For hypertension    Metamucil  -- orally once a day  -- Indication: For constipation    docusate sodium 100 mg oral capsule  -- 1 cap(s) by mouth 3 times a day  -- Indication: For constipation    pantoprazole 40 mg oral delayed release tablet  -- 1 tab(s) by mouth once a day  -- Indication: For reflux

## 2018-09-21 NOTE — DISCHARGE NOTE ADULT - HOSPITAL COURSE
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  Patient went to Pre-Surgical Testing at [facility] and was medically cleared to undergo elective procedure. L4-S1 discectomy performed on 9/20/18 with Dr. Guevara. No operative or jannette-operative complications arose during patients hospital course.  Patient received antibiotic according to SCIP guidelines for infection prevention.  SCDS was given for DVT prophylaxis.  Anesthesia, Medical Hospitalist, Physical Therapy and Occupational Therapy were consulted. Patient is stable for discharge with a good prognosis.  Appropriate discharge instructions and medications are provided in this document.

## 2018-09-21 NOTE — DISCHARGE NOTE ADULT - INSTRUCTIONS
For Constipation :   • Increase your water intake. Drink at least 8 glasses of water daily.  • Try adding fiber to your diet by eating fruits, vegetables and foods that are rich in grains.  • If you do experience constipation, you may take an over-the-counter stool softener/laxative such as Danielle Colace, Senek[place] or  Milk of Magnesia.

## 2018-09-21 NOTE — OCCUPATIONAL THERAPY INITIAL EVALUATION ADULT - PREDICTED DURATION OF THERAPY (DAYS/WKS), OT EVAL
Patient cleared from OT services, with recommended DME for safety (pt has all DME & declined shower seat). Anticipated d/c home today with assist  pending medical & PT clearance. Vandana Clark RN & MARIZA Mae aware

## 2018-09-24 LAB — SURGICAL PATHOLOGY FINAL REPORT - CH: SIGNIFICANT CHANGE UP

## 2018-10-03 ENCOUNTER — APPOINTMENT (OUTPATIENT)
Dept: ORTHOPEDIC SURGERY | Facility: CLINIC | Age: 78
End: 2018-10-03
Payer: MEDICARE

## 2018-10-03 VITALS
HEART RATE: 63 BPM | SYSTOLIC BLOOD PRESSURE: 139 MMHG | DIASTOLIC BLOOD PRESSURE: 87 MMHG | WEIGHT: 164 LBS | BODY MASS INDEX: 24.29 KG/M2 | HEIGHT: 69 IN

## 2018-10-03 PROCEDURE — 99024 POSTOP FOLLOW-UP VISIT: CPT

## 2018-10-03 RX ORDER — CELECOXIB 100 MG/1
100 CAPSULE ORAL TWICE DAILY
Qty: 15 | Refills: 0 | Status: DISCONTINUED | COMMUNITY
Start: 2018-10-03 | End: 2018-10-03

## 2018-10-14 ENCOUNTER — NON-APPOINTMENT (OUTPATIENT)
Age: 78
End: 2018-10-14

## 2018-10-30 ENCOUNTER — MEDICATION RENEWAL (OUTPATIENT)
Age: 78
End: 2018-10-30

## 2018-11-05 ENCOUNTER — APPOINTMENT (OUTPATIENT)
Dept: CARDIOLOGY | Facility: CLINIC | Age: 78
End: 2018-11-05

## 2018-11-05 ENCOUNTER — MEDICATION RENEWAL (OUTPATIENT)
Age: 78
End: 2018-11-05

## 2018-11-05 ENCOUNTER — APPOINTMENT (OUTPATIENT)
Dept: ORTHOPEDIC SURGERY | Facility: CLINIC | Age: 78
End: 2018-11-05
Payer: MEDICARE

## 2018-11-05 VITALS — DIASTOLIC BLOOD PRESSURE: 60 MMHG | SYSTOLIC BLOOD PRESSURE: 128 MMHG | OXYGEN SATURATION: 97 % | HEART RATE: 77 BPM

## 2018-11-05 VITALS — WEIGHT: 165 LBS | HEIGHT: 69 IN | BODY MASS INDEX: 24.44 KG/M2

## 2018-11-05 DIAGNOSIS — M54.16 RADICULOPATHY, LUMBAR REGION: ICD-10-CM

## 2018-11-05 PROCEDURE — 99024 POSTOP FOLLOW-UP VISIT: CPT

## 2018-12-17 ENCOUNTER — APPOINTMENT (OUTPATIENT)
Dept: CARDIOLOGY | Facility: CLINIC | Age: 78
End: 2018-12-17
Payer: MEDICARE

## 2018-12-17 ENCOUNTER — NON-APPOINTMENT (OUTPATIENT)
Age: 78
End: 2018-12-17

## 2018-12-17 VITALS — OXYGEN SATURATION: 98 % | HEART RATE: 78 BPM

## 2018-12-17 VITALS — BODY MASS INDEX: 24.51 KG/M2 | WEIGHT: 166 LBS

## 2018-12-17 VITALS — SYSTOLIC BLOOD PRESSURE: 127 MMHG | DIASTOLIC BLOOD PRESSURE: 71 MMHG

## 2018-12-17 PROCEDURE — 93040 RHYTHM ECG WITH REPORT: CPT | Mod: 59

## 2018-12-17 PROCEDURE — 93000 ELECTROCARDIOGRAM COMPLETE: CPT

## 2018-12-17 PROCEDURE — 36415 COLL VENOUS BLD VENIPUNCTURE: CPT

## 2018-12-17 PROCEDURE — 99214 OFFICE O/P EST MOD 30 MIN: CPT

## 2018-12-17 NOTE — HISTORY OF PRESENT ILLNESS
[FreeTextEntry1] : He denies chest pain, shortness of breath, and palpitations.\par He goes to the gym 5-6 times per week. \par He just became a great grandfather ([place]).

## 2018-12-18 ENCOUNTER — APPOINTMENT (OUTPATIENT)
Dept: CARDIOLOGY | Facility: CLINIC | Age: 78
End: 2018-12-18
Payer: MEDICARE

## 2018-12-18 PROCEDURE — 93306 TTE W/DOPPLER COMPLETE: CPT

## 2018-12-25 ENCOUNTER — NON-APPOINTMENT (OUTPATIENT)
Age: 78
End: 2018-12-25

## 2018-12-27 LAB
ALBUMIN SERPL ELPH-MCNC: 4 G/DL
ALP BLD-CCNC: 48 U/L
ALT SERPL-CCNC: 20 U/L
ANION GAP SERPL CALC-SCNC: 10 MMOL/L
AST SERPL-CCNC: 36 U/L
BASOPHILS # BLD AUTO: 0.02 K/UL
BASOPHILS NFR BLD AUTO: 0.2 %
BILIRUB SERPL-MCNC: 0.6 MG/DL
BUN SERPL-MCNC: 26 MG/DL
CALCIUM SERPL-MCNC: 9.7 MG/DL
CHLORIDE SERPL-SCNC: 109 MMOL/L
CHOLEST SERPL-MCNC: 184 MG/DL
CHOLEST/HDLC SERPL: 2.3 RATIO
CO2 SERPL-SCNC: 25 MMOL/L
CREAT SERPL-MCNC: 1.04 MG/DL
EOSINOPHIL # BLD AUTO: 0.09 K/UL
EOSINOPHIL NFR BLD AUTO: 1.1 %
GLUCOSE SERPL-MCNC: 94 MG/DL
HBA1C MFR BLD HPLC: 5.6 %
HCT VFR BLD CALC: 41.8 %
HDLC SERPL-MCNC: 79 MG/DL
HGB BLD-MCNC: 13.4 G/DL
IMM GRANULOCYTES NFR BLD AUTO: 0.2 %
LDLC SERPL CALC-MCNC: 95 MG/DL
LDLC SERPL DIRECT ASSAY-MCNC: 107 MG/DL
LYMPHOCYTES # BLD AUTO: 4.31 K/UL
LYMPHOCYTES NFR BLD AUTO: 52.6 %
MAN DIFF?: NORMAL
MCHC RBC-ENTMCNC: 28.3 PG
MCHC RBC-ENTMCNC: 32.1 GM/DL
MCV RBC AUTO: 88.4 FL
MONOCYTES # BLD AUTO: 0.78 K/UL
MONOCYTES NFR BLD AUTO: 9.5 %
NEUTROPHILS # BLD AUTO: 2.98 K/UL
NEUTROPHILS NFR BLD AUTO: 36.4 %
PLATELET # BLD AUTO: 182 K/UL
POTASSIUM SERPL-SCNC: 5.2 MMOL/L
PROT SERPL-MCNC: 6.7 G/DL
RBC # BLD: 4.73 M/UL
RBC # FLD: 15.7 %
SODIUM SERPL-SCNC: 144 MMOL/L
TRIGL SERPL-MCNC: 48 MG/DL
WBC # FLD AUTO: 8.2 K/UL

## 2018-12-30 ENCOUNTER — MESSAGE (OUTPATIENT)
Age: 78
End: 2018-12-30

## 2019-01-02 ENCOUNTER — OUTPATIENT (OUTPATIENT)
Dept: OUTPATIENT SERVICES | Facility: HOSPITAL | Age: 79
LOS: 1 days | End: 2019-01-02
Payer: MEDICARE

## 2019-01-02 ENCOUNTER — APPOINTMENT (OUTPATIENT)
Dept: CV DIAGNOSITCS | Facility: HOSPITAL | Age: 79
End: 2019-01-02

## 2019-01-02 ENCOUNTER — FORM ENCOUNTER (OUTPATIENT)
Age: 79
End: 2019-01-02

## 2019-01-02 DIAGNOSIS — Z90.89 ACQUIRED ABSENCE OF OTHER ORGANS: Chronic | ICD-10-CM

## 2019-01-02 DIAGNOSIS — Z98.41 CATARACT EXTRACTION STATUS, RIGHT EYE: Chronic | ICD-10-CM

## 2019-01-02 DIAGNOSIS — Z90.49 ACQUIRED ABSENCE OF OTHER SPECIFIED PARTS OF DIGESTIVE TRACT: Chronic | ICD-10-CM

## 2019-01-02 DIAGNOSIS — I34.0 NONRHEUMATIC MITRAL (VALVE) INSUFFICIENCY: ICD-10-CM

## 2019-01-02 DIAGNOSIS — Z96.643 PRESENCE OF ARTIFICIAL HIP JOINT, BILATERAL: Chronic | ICD-10-CM

## 2019-01-02 PROCEDURE — 93306 TTE W/DOPPLER COMPLETE: CPT | Mod: 26

## 2019-01-02 PROCEDURE — 93312 ECHO TRANSESOPHAGEAL: CPT

## 2019-01-02 PROCEDURE — 93312 ECHO TRANSESOPHAGEAL: CPT | Mod: 26

## 2019-01-02 PROCEDURE — 93306 TTE W/DOPPLER COMPLETE: CPT

## 2019-01-03 ENCOUNTER — OUTPATIENT (OUTPATIENT)
Dept: OUTPATIENT SERVICES | Facility: HOSPITAL | Age: 79
LOS: 1 days | End: 2019-01-03
Payer: MEDICARE

## 2019-01-03 ENCOUNTER — APPOINTMENT (OUTPATIENT)
Dept: MRI IMAGING | Facility: CLINIC | Age: 79
End: 2019-01-03
Payer: MEDICARE

## 2019-01-03 DIAGNOSIS — Z90.49 ACQUIRED ABSENCE OF OTHER SPECIFIED PARTS OF DIGESTIVE TRACT: Chronic | ICD-10-CM

## 2019-01-03 DIAGNOSIS — Z98.41 CATARACT EXTRACTION STATUS, RIGHT EYE: Chronic | ICD-10-CM

## 2019-01-03 DIAGNOSIS — Z96.643 PRESENCE OF ARTIFICIAL HIP JOINT, BILATERAL: Chronic | ICD-10-CM

## 2019-01-03 DIAGNOSIS — Z90.89 ACQUIRED ABSENCE OF OTHER ORGANS: Chronic | ICD-10-CM

## 2019-01-03 DIAGNOSIS — Z00.8 ENCOUNTER FOR OTHER GENERAL EXAMINATION: ICD-10-CM

## 2019-01-03 PROCEDURE — A9585: CPT

## 2019-01-03 PROCEDURE — 75565 CARD MRI VELOC FLOW MAPPING: CPT

## 2019-01-03 PROCEDURE — 75561 CARDIAC MRI FOR MORPH W/DYE: CPT | Mod: 26

## 2019-01-03 PROCEDURE — 75561 CARDIAC MRI FOR MORPH W/DYE: CPT

## 2019-01-03 PROCEDURE — 75565 CARD MRI VELOC FLOW MAPPING: CPT | Mod: 26

## 2019-01-08 ENCOUNTER — MESSAGE (OUTPATIENT)
Age: 79
End: 2019-01-08

## 2019-03-03 ENCOUNTER — TRANSCRIPTION ENCOUNTER (OUTPATIENT)
Age: 79
End: 2019-03-03

## 2019-04-08 ENCOUNTER — APPOINTMENT (OUTPATIENT)
Dept: CARDIOLOGY | Facility: CLINIC | Age: 79
End: 2019-04-08
Payer: MEDICARE

## 2019-04-08 ENCOUNTER — NON-APPOINTMENT (OUTPATIENT)
Age: 79
End: 2019-04-08

## 2019-04-08 VITALS
BODY MASS INDEX: 24.37 KG/M2 | SYSTOLIC BLOOD PRESSURE: 140 MMHG | WEIGHT: 165 LBS | OXYGEN SATURATION: 97 % | HEART RATE: 57 BPM | DIASTOLIC BLOOD PRESSURE: 78 MMHG

## 2019-04-08 PROCEDURE — 93040 RHYTHM ECG WITH REPORT: CPT | Mod: 59

## 2019-04-08 PROCEDURE — 93000 ELECTROCARDIOGRAM COMPLETE: CPT

## 2019-04-08 PROCEDURE — 99214 OFFICE O/P EST MOD 30 MIN: CPT

## 2019-04-13 ENCOUNTER — RESULT CHARGE (OUTPATIENT)
Age: 79
End: 2019-04-13

## 2019-05-06 ENCOUNTER — NON-APPOINTMENT (OUTPATIENT)
Age: 79
End: 2019-05-06

## 2019-05-06 ENCOUNTER — APPOINTMENT (OUTPATIENT)
Dept: CARDIOLOGY | Facility: CLINIC | Age: 79
End: 2019-05-06
Payer: MEDICARE

## 2019-05-06 ENCOUNTER — RESULT CHARGE (OUTPATIENT)
Age: 79
End: 2019-05-06

## 2019-05-06 VITALS
HEIGHT: 69 IN | SYSTOLIC BLOOD PRESSURE: 140 MMHG | HEART RATE: 68 BPM | BODY MASS INDEX: 24.14 KG/M2 | OXYGEN SATURATION: 96 % | WEIGHT: 163 LBS | DIASTOLIC BLOOD PRESSURE: 84 MMHG

## 2019-05-06 PROCEDURE — 99214 OFFICE O/P EST MOD 30 MIN: CPT

## 2019-05-06 PROCEDURE — 93000 ELECTROCARDIOGRAM COMPLETE: CPT

## 2019-05-07 NOTE — HISTORY OF PRESENT ILLNESS
[FreeTextEntry1] : He is on a higher dose of amlodipine (7.5 q.d.).\par He denies chest pain, shortness of breath, and palpitations.\par

## 2019-06-09 ENCOUNTER — LABORATORY RESULT (OUTPATIENT)
Age: 79
End: 2019-06-09

## 2019-06-10 ENCOUNTER — NON-APPOINTMENT (OUTPATIENT)
Age: 79
End: 2019-06-10

## 2019-06-10 ENCOUNTER — APPOINTMENT (OUTPATIENT)
Dept: CARDIOLOGY | Facility: CLINIC | Age: 79
End: 2019-06-10
Payer: MEDICARE

## 2019-06-10 VITALS
HEIGHT: 69 IN | HEART RATE: 57 BPM | OXYGEN SATURATION: 96 % | BODY MASS INDEX: 23.99 KG/M2 | SYSTOLIC BLOOD PRESSURE: 160 MMHG | DIASTOLIC BLOOD PRESSURE: 87 MMHG | WEIGHT: 162 LBS | TEMPERATURE: 97.3 F

## 2019-06-10 PROCEDURE — 93000 ELECTROCARDIOGRAM COMPLETE: CPT

## 2019-06-10 PROCEDURE — 93040 RHYTHM ECG WITH REPORT: CPT | Mod: 59

## 2019-06-10 PROCEDURE — 99214 OFFICE O/P EST MOD 30 MIN: CPT

## 2019-06-15 ENCOUNTER — RESULT CHARGE (OUTPATIENT)
Age: 79
End: 2019-06-15

## 2019-06-19 ENCOUNTER — MESSAGE (OUTPATIENT)
Age: 79
End: 2019-06-19

## 2019-06-23 ENCOUNTER — MESSAGE (OUTPATIENT)
Age: 79
End: 2019-06-23

## 2019-06-23 ENCOUNTER — MEDICATION RENEWAL (OUTPATIENT)
Age: 79
End: 2019-06-23

## 2019-06-30 LAB
ALBUMIN SERPL ELPH-MCNC: 4.5 G/DL
ALP BLD-CCNC: 58 U/L
ALT SERPL-CCNC: 23 U/L
ANION GAP SERPL CALC-SCNC: 15 MMOL/L
AST SERPL-CCNC: 32 U/L
BILIRUB SERPL-MCNC: 0.8 MG/DL
BUN SERPL-MCNC: 24 MG/DL
CALCIUM SERPL-MCNC: 9.9 MG/DL
CHLORIDE SERPL-SCNC: 103 MMOL/L
CHOLEST SERPL-MCNC: 191 MG/DL
CHOLEST/HDLC SERPL: 2 RATIO
CO2 SERPL-SCNC: 23 MMOL/L
CREAT SERPL-MCNC: 0.98 MG/DL
HDLC SERPL-MCNC: 95 MG/DL
LDLC SERPL CALC-MCNC: 89 MG/DL
LDLC SERPL DIRECT ASSAY-MCNC: 100 MG/DL
POTASSIUM SERPL-SCNC: 4.9 MMOL/L
PROT SERPL-MCNC: 6.8 G/DL
SODIUM SERPL-SCNC: 141 MMOL/L
T3 SERPL-MCNC: 101 NG/DL
T3RU NFR SERPL: 1.1 TBI
T4 FREE SERPL-MCNC: 1.2 NG/DL
T4 SERPL-MCNC: 6.9 UG/DL
TRIGL SERPL-MCNC: 36 MG/DL
TSH SERPL-ACNC: 0.95 UIU/ML

## 2019-07-10 ENCOUNTER — APPOINTMENT (OUTPATIENT)
Dept: CARDIOLOGY | Facility: CLINIC | Age: 79
End: 2019-07-10
Payer: MEDICARE

## 2019-07-10 ENCOUNTER — NON-APPOINTMENT (OUTPATIENT)
Age: 79
End: 2019-07-10

## 2019-07-10 VITALS
BODY MASS INDEX: 24.22 KG/M2 | OXYGEN SATURATION: 97 % | SYSTOLIC BLOOD PRESSURE: 130 MMHG | DIASTOLIC BLOOD PRESSURE: 80 MMHG | WEIGHT: 164 LBS | HEART RATE: 68 BPM

## 2019-07-10 PROCEDURE — 93306 TTE W/DOPPLER COMPLETE: CPT

## 2019-07-10 PROCEDURE — 36415 COLL VENOUS BLD VENIPUNCTURE: CPT

## 2019-07-10 PROCEDURE — 93040 RHYTHM ECG WITH REPORT: CPT | Mod: 59

## 2019-07-10 PROCEDURE — 93000 ELECTROCARDIOGRAM COMPLETE: CPT

## 2019-07-10 PROCEDURE — 99214 OFFICE O/P EST MOD 30 MIN: CPT

## 2019-07-10 RX ORDER — METHYLPREDNISOLONE 4 MG/1
4 TABLET ORAL
Qty: 1 | Refills: 0 | Status: DISCONTINUED | COMMUNITY
Start: 2018-07-02 | End: 2019-07-10

## 2019-07-12 ENCOUNTER — RESULT CHARGE (OUTPATIENT)
Age: 79
End: 2019-07-12

## 2019-07-17 ENCOUNTER — NON-APPOINTMENT (OUTPATIENT)
Age: 79
End: 2019-07-17

## 2019-07-20 LAB
ALBUMIN SERPL ELPH-MCNC: 4.4 G/DL
ALP BLD-CCNC: 55 U/L
ALT SERPL-CCNC: 20 U/L
ANION GAP SERPL CALC-SCNC: 12 MMOL/L
AST SERPL-CCNC: 36 U/L
BILIRUB SERPL-MCNC: 0.8 MG/DL
BUN SERPL-MCNC: 26 MG/DL
CALCIUM SERPL-MCNC: 10.4 MG/DL
CHLORIDE SERPL-SCNC: 104 MMOL/L
CO2 SERPL-SCNC: 24 MMOL/L
CREAT SERPL-MCNC: 0.98 MG/DL
MAGNESIUM SERPL-MCNC: 1.7 MG/DL
POTASSIUM SERPL-SCNC: 4.7 MMOL/L
PROT SERPL-MCNC: 6.7 G/DL
SODIUM SERPL-SCNC: 140 MMOL/L

## 2019-07-22 ENCOUNTER — MESSAGE (OUTPATIENT)
Age: 79
End: 2019-07-22

## 2019-07-29 ENCOUNTER — TRANSCRIPTION ENCOUNTER (OUTPATIENT)
Age: 79
End: 2019-07-29

## 2019-07-29 ENCOUNTER — MESSAGE (OUTPATIENT)
Age: 79
End: 2019-07-29

## 2019-07-30 ENCOUNTER — NON-APPOINTMENT (OUTPATIENT)
Age: 79
End: 2019-07-30

## 2019-09-03 ENCOUNTER — MEDICATION RENEWAL (OUTPATIENT)
Age: 79
End: 2019-09-03

## 2019-09-04 ENCOUNTER — RX CHANGE (OUTPATIENT)
Age: 79
End: 2019-09-04

## 2019-09-07 ENCOUNTER — MEDICATION RENEWAL (OUTPATIENT)
Age: 79
End: 2019-09-07

## 2019-11-12 ENCOUNTER — LABORATORY RESULT (OUTPATIENT)
Age: 79
End: 2019-11-12

## 2019-11-12 ENCOUNTER — NON-APPOINTMENT (OUTPATIENT)
Age: 79
End: 2019-11-12

## 2019-11-12 ENCOUNTER — APPOINTMENT (OUTPATIENT)
Dept: CARDIOLOGY | Facility: CLINIC | Age: 79
End: 2019-11-12
Payer: MEDICARE

## 2019-11-12 VITALS
OXYGEN SATURATION: 96 % | WEIGHT: 160 LBS | BODY MASS INDEX: 23.7 KG/M2 | SYSTOLIC BLOOD PRESSURE: 118 MMHG | DIASTOLIC BLOOD PRESSURE: 64 MMHG | HEIGHT: 69 IN | HEART RATE: 52 BPM

## 2019-11-12 VITALS — DIASTOLIC BLOOD PRESSURE: 72 MMHG | SYSTOLIC BLOOD PRESSURE: 122 MMHG

## 2019-11-12 PROCEDURE — G0008: CPT

## 2019-11-12 PROCEDURE — 90662 IIV NO PRSV INCREASED AG IM: CPT

## 2019-11-12 PROCEDURE — 93040 RHYTHM ECG WITH REPORT: CPT | Mod: 59

## 2019-11-12 PROCEDURE — 99214 OFFICE O/P EST MOD 30 MIN: CPT

## 2019-11-12 PROCEDURE — 36415 COLL VENOUS BLD VENIPUNCTURE: CPT

## 2019-11-12 PROCEDURE — 93000 ELECTROCARDIOGRAM COMPLETE: CPT

## 2019-11-12 NOTE — HISTORY OF PRESENT ILLNESS
[FreeTextEntry1] : He denies chest pain, shortness of breath, palpitations.\par He had dizziness for "a moment or two" this morning in the shower.\par 80th birthday soon.

## 2019-11-17 LAB
ALBUMIN SERPL ELPH-MCNC: 4.3 G/DL
ALP BLD-CCNC: 48 U/L
ALT SERPL-CCNC: 19 U/L
ANION GAP SERPL CALC-SCNC: 12 MMOL/L
AST SERPL-CCNC: 33 U/L
BASOPHILS # BLD AUTO: 0.02 K/UL
BASOPHILS NFR BLD AUTO: 0.2 %
BILIRUB SERPL-MCNC: 0.8 MG/DL
BUN SERPL-MCNC: 24 MG/DL
CALCIUM SERPL-MCNC: 9.5 MG/DL
CHLORIDE SERPL-SCNC: 104 MMOL/L
CHOLEST SERPL-MCNC: 194 MG/DL
CHOLEST/HDLC SERPL: 2.2 RATIO
CO2 SERPL-SCNC: 24 MMOL/L
CREAT SERPL-MCNC: 1.08 MG/DL
EOSINOPHIL # BLD AUTO: 0.13 K/UL
EOSINOPHIL NFR BLD AUTO: 1.5 %
GLUCOSE SERPL-MCNC: 98 MG/DL
HCT VFR BLD CALC: 42.6 %
HDLC SERPL-MCNC: 90 MG/DL
HGB BLD-MCNC: 14 G/DL
IMM GRANULOCYTES NFR BLD AUTO: 0.2 %
LDLC SERPL CALC-MCNC: 93 MG/DL
LDLC SERPL DIRECT ASSAY-MCNC: 110 MG/DL
LYMPHOCYTES # BLD AUTO: 4.46 K/UL
LYMPHOCYTES NFR BLD AUTO: 53 %
MAN DIFF?: NORMAL
MCHC RBC-ENTMCNC: 29.3 PG
MCHC RBC-ENTMCNC: 32.9 GM/DL
MCV RBC AUTO: 89.1 FL
MONOCYTES # BLD AUTO: 0.78 K/UL
MONOCYTES NFR BLD AUTO: 9.3 %
NEUTROPHILS # BLD AUTO: 3 K/UL
NEUTROPHILS NFR BLD AUTO: 35.8 %
PLATELET # BLD AUTO: 193 K/UL
POTASSIUM SERPL-SCNC: 4.5 MMOL/L
PROT SERPL-MCNC: 6.6 G/DL
RBC # BLD: 4.78 M/UL
RBC # FLD: 15.2 %
SODIUM SERPL-SCNC: 140 MMOL/L
T3 SERPL-MCNC: 97 NG/DL
T3RU NFR SERPL: 1 TBI
T4 FREE SERPL-MCNC: 1.3 NG/DL
T4 SERPL-MCNC: 7.2 UG/DL
TRIGL SERPL-MCNC: 53 MG/DL
TSH SERPL-ACNC: 1.93 UIU/ML
WBC # FLD AUTO: 8.41 K/UL

## 2020-03-23 ENCOUNTER — APPOINTMENT (OUTPATIENT)
Dept: CARDIOLOGY | Facility: CLINIC | Age: 80
End: 2020-03-23

## 2020-05-13 ENCOUNTER — RX RENEWAL (OUTPATIENT)
Age: 80
End: 2020-05-13

## 2020-05-18 ENCOUNTER — APPOINTMENT (OUTPATIENT)
Dept: CARDIOLOGY | Facility: CLINIC | Age: 80
End: 2020-05-18
Payer: MEDICARE

## 2020-05-18 PROCEDURE — 99214 OFFICE O/P EST MOD 30 MIN: CPT | Mod: 95

## 2020-05-18 RX ORDER — CELECOXIB 100 MG/1
100 CAPSULE ORAL TWICE DAILY
Qty: 60 | Refills: 0 | Status: DISCONTINUED | COMMUNITY
Start: 2018-10-03 | End: 2020-05-18

## 2020-05-18 RX ORDER — NAPROXEN 500 MG/1
500 TABLET, DELAYED RELEASE ORAL
Qty: 60 | Refills: 2 | Status: DISCONTINUED | COMMUNITY
Start: 2018-05-11 | End: 2020-05-18

## 2020-05-18 NOTE — REASON FOR VISIT
[Hyperlipidemia] : hyperlipidemia [Hypertension] : hypertension [Mitral Regurgitation] : mitral regurgitation [FreeTextEntry2] : Telehealth visit [FreeTextEntry1] : TeleHealth Video Encounter\par Initiated by: Patient election for TeleHealth visit\par Patient was consented for TeleHealth visit\par Patient Location: Home\par Physician Location: Home\par

## 2020-05-18 NOTE — HISTORY OF PRESENT ILLNESS
[FreeTextEntry1] : INTERVAL HiSTORY: \par Hypertension: He is on amlodipine and ramipril.  He is watching his diet and watching his salt intake; he has lost some weight (down to 160 pounds).  He is walking 2 miles per day his blood pressures at home are averaging 111/67.  His heart rate is averaging 57.\par Mitral regurgitation: He denies shortness of breath.  He had moderate to severe mitral regurgitation on his last echocardiogram in July 2019.  He does not want to come in for another echocardiogram until 3 months he now.  \par Dilated ascending aorta: He denies chest pain.  His ascending aorta was 4.0 cm at the time of his last echocardiogram July 2019.\par \par REVIEW OF SYSTEMS:\par Cardiac - denies chest pain, shortness of breath, palpitations, and dizziness\par GI - denies abdominal pain, nausea, vomiting, and diarrhea\par \par PERTINENT PMH:\par Positive for hypercholesterolemia: He is on simvastatin and tolerating it well.  He is watching his diet. His last LDL was 110 in November 2019.\par \par \par

## 2020-08-17 ENCOUNTER — LABORATORY RESULT (OUTPATIENT)
Age: 80
End: 2020-08-17

## 2020-08-17 ENCOUNTER — APPOINTMENT (OUTPATIENT)
Dept: CARDIOLOGY | Facility: CLINIC | Age: 80
End: 2020-08-17
Payer: MEDICARE

## 2020-08-17 ENCOUNTER — NON-APPOINTMENT (OUTPATIENT)
Age: 80
End: 2020-08-17

## 2020-08-17 VITALS
HEART RATE: 52 BPM | BODY MASS INDEX: 23.78 KG/M2 | DIASTOLIC BLOOD PRESSURE: 72 MMHG | TEMPERATURE: 97.9 F | WEIGHT: 161 LBS | SYSTOLIC BLOOD PRESSURE: 142 MMHG | OXYGEN SATURATION: 97 %

## 2020-08-17 VITALS — DIASTOLIC BLOOD PRESSURE: 66 MMHG | SYSTOLIC BLOOD PRESSURE: 136 MMHG

## 2020-08-17 DIAGNOSIS — Z00.00 ENCOUNTER FOR GENERAL ADULT MEDICAL EXAMINATION W/OUT ABNORMAL FINDINGS: ICD-10-CM

## 2020-08-17 PROCEDURE — 99214 OFFICE O/P EST MOD 30 MIN: CPT

## 2020-08-17 PROCEDURE — 93040 RHYTHM ECG WITH REPORT: CPT | Mod: 59

## 2020-08-17 PROCEDURE — 93000 ELECTROCARDIOGRAM COMPLETE: CPT

## 2020-08-17 PROCEDURE — 93306 TTE W/DOPPLER COMPLETE: CPT

## 2020-08-17 PROCEDURE — 36415 COLL VENOUS BLD VENIPUNCTURE: CPT

## 2020-08-17 NOTE — HISTORY OF PRESENT ILLNESS
[FreeTextEntry1] : He denies chest pain, shortness of breath, palpitations.\par Walking 2 miles/day.

## 2020-08-21 ENCOUNTER — NON-APPOINTMENT (OUTPATIENT)
Age: 80
End: 2020-08-21

## 2020-08-29 LAB
ALBUMIN SERPL ELPH-MCNC: 4.4 G/DL
ALP BLD-CCNC: 54 U/L
ALT SERPL-CCNC: 23 U/L
ANION GAP SERPL CALC-SCNC: 9 MMOL/L
AST SERPL-CCNC: 41 U/L
BASOPHILS # BLD AUTO: 0.02 K/UL
BASOPHILS NFR BLD AUTO: 0.2 %
BILIRUB SERPL-MCNC: 0.8 MG/DL
BUN SERPL-MCNC: 26 MG/DL
CALCIUM SERPL-MCNC: 9.6 MG/DL
CHLORIDE SERPL-SCNC: 105 MMOL/L
CHOLEST SERPL-MCNC: 189 MG/DL
CHOLEST/HDLC SERPL: 2 RATIO
CO2 SERPL-SCNC: 25 MMOL/L
CREAT SERPL-MCNC: 1.05 MG/DL
EOSINOPHIL # BLD AUTO: 0.08 K/UL
EOSINOPHIL NFR BLD AUTO: 0.9 %
GLUCOSE SERPL-MCNC: 89 MG/DL
HCT VFR BLD CALC: 44.5 %
HDLC SERPL-MCNC: 93 MG/DL
HGB BLD-MCNC: 14.1 G/DL
IMM GRANULOCYTES NFR BLD AUTO: 0.1 %
LDLC SERPL CALC-MCNC: 85 MG/DL
LDLC SERPL DIRECT ASSAY-MCNC: 97 MG/DL
LYMPHOCYTES # BLD AUTO: 4.14 K/UL
LYMPHOCYTES NFR BLD AUTO: 47.9 %
MAGNESIUM SERPL-MCNC: 2 MG/DL
MAN DIFF?: NORMAL
MCHC RBC-ENTMCNC: 28.9 PG
MCHC RBC-ENTMCNC: 31.7 GM/DL
MCV RBC AUTO: 91.2 FL
MONOCYTES # BLD AUTO: 0.84 K/UL
MONOCYTES NFR BLD AUTO: 9.7 %
NEUTROPHILS # BLD AUTO: 3.56 K/UL
NEUTROPHILS NFR BLD AUTO: 41.2 %
PLATELET # BLD AUTO: 186 K/UL
POTASSIUM SERPL-SCNC: 4.6 MMOL/L
PROT SERPL-MCNC: 6.5 G/DL
RBC # BLD: 4.88 M/UL
RBC # FLD: 15.2 %
SARS-COV-2 IGG SERPL IA-ACNC: 0.01 INDEX
SARS-COV-2 IGG SERPL QL IA: NEGATIVE
SODIUM SERPL-SCNC: 139 MMOL/L
T3 SERPL-MCNC: 112 NG/DL
T3RU NFR SERPL: 1.1 TBI
T4 FREE SERPL-MCNC: 1.4 NG/DL
T4 SERPL-MCNC: 7.9 UG/DL
TRIGL SERPL-MCNC: 53 MG/DL
TSH SERPL-ACNC: 2.02 UIU/ML
WBC # FLD AUTO: 8.65 K/UL

## 2020-08-31 DIAGNOSIS — Z23 ENCOUNTER FOR IMMUNIZATION: ICD-10-CM

## 2020-11-16 ENCOUNTER — NON-APPOINTMENT (OUTPATIENT)
Age: 80
End: 2020-11-16

## 2020-11-16 ENCOUNTER — LABORATORY RESULT (OUTPATIENT)
Age: 80
End: 2020-11-16

## 2020-11-16 ENCOUNTER — APPOINTMENT (OUTPATIENT)
Dept: CARDIOLOGY | Facility: CLINIC | Age: 80
End: 2020-11-16
Payer: MEDICARE

## 2020-11-16 VITALS
WEIGHT: 160 LBS | TEMPERATURE: 97.8 F | SYSTOLIC BLOOD PRESSURE: 122 MMHG | OXYGEN SATURATION: 97 % | HEART RATE: 47 BPM | DIASTOLIC BLOOD PRESSURE: 70 MMHG | BODY MASS INDEX: 23.63 KG/M2

## 2020-11-16 VITALS — SYSTOLIC BLOOD PRESSURE: 132 MMHG | DIASTOLIC BLOOD PRESSURE: 62 MMHG

## 2020-11-16 PROCEDURE — 93040 RHYTHM ECG WITH REPORT: CPT | Mod: 59

## 2020-11-16 PROCEDURE — 93000 ELECTROCARDIOGRAM COMPLETE: CPT

## 2020-11-16 PROCEDURE — 99214 OFFICE O/P EST MOD 30 MIN: CPT

## 2020-11-16 PROCEDURE — 36415 COLL VENOUS BLD VENIPUNCTURE: CPT

## 2020-11-16 NOTE — REASON FOR VISIT
[Follow-Up - Clinic] : a clinic follow-up of [Hypertension] : hypertension [Mitral Regurgitation] : mitral regurgitation [Supraventricular Tachycardia] : supraventricular tachycardia

## 2020-11-22 ENCOUNTER — NON-APPOINTMENT (OUTPATIENT)
Age: 80
End: 2020-11-22

## 2020-11-22 LAB
ALBUMIN SERPL ELPH-MCNC: 4.6 G/DL
ALP BLD-CCNC: 68 U/L
ALT SERPL-CCNC: 19 U/L
ANION GAP SERPL CALC-SCNC: 14 MMOL/L
AST SERPL-CCNC: 30 U/L
BASOPHILS # BLD AUTO: 0.03 K/UL
BASOPHILS NFR BLD AUTO: 0.3 %
BILIRUB SERPL-MCNC: 0.7 MG/DL
BUN SERPL-MCNC: 29 MG/DL
CALCIUM SERPL-MCNC: 9.9 MG/DL
CHLORIDE SERPL-SCNC: 102 MMOL/L
CHOLEST SERPL-MCNC: 199 MG/DL
CO2 SERPL-SCNC: 24 MMOL/L
CREAT SERPL-MCNC: 1.12 MG/DL
EOSINOPHIL # BLD AUTO: 0.09 K/UL
EOSINOPHIL NFR BLD AUTO: 1 %
GLUCOSE SERPL-MCNC: 94 MG/DL
HCT VFR BLD CALC: 42 %
HDLC SERPL-MCNC: 95 MG/DL
HGB BLD-MCNC: 13.4 G/DL
IMM GRANULOCYTES NFR BLD AUTO: 0.1 %
LDLC SERPL CALC-MCNC: 98 MG/DL
LDLC SERPL DIRECT ASSAY-MCNC: 100 MG/DL
LYMPHOCYTES # BLD AUTO: 4.09 K/UL
LYMPHOCYTES NFR BLD AUTO: 44.6 %
MAN DIFF?: NORMAL
MCHC RBC-ENTMCNC: 28.2 PG
MCHC RBC-ENTMCNC: 31.9 GM/DL
MCV RBC AUTO: 88.2 FL
MONOCYTES # BLD AUTO: 0.89 K/UL
MONOCYTES NFR BLD AUTO: 9.7 %
NEUTROPHILS # BLD AUTO: 4.07 K/UL
NEUTROPHILS NFR BLD AUTO: 44.3 %
NONHDLC SERPL-MCNC: 105 MG/DL
PLATELET # BLD AUTO: 195 K/UL
POTASSIUM SERPL-SCNC: 5.4 MMOL/L
PROT SERPL-MCNC: 6.9 G/DL
RBC # BLD: 4.76 M/UL
RBC # FLD: 15.3 %
SODIUM SERPL-SCNC: 140 MMOL/L
T3 SERPL-MCNC: 109 NG/DL
T3RU NFR SERPL: 1 TBI
T4 FREE SERPL-MCNC: 1.3 NG/DL
T4 SERPL-MCNC: 7.8 UG/DL
TRIGL SERPL-MCNC: 35 MG/DL
TSH SERPL-ACNC: 1.66 UIU/ML
WBC # FLD AUTO: 9.18 K/UL

## 2020-11-25 ENCOUNTER — NON-APPOINTMENT (OUTPATIENT)
Age: 80
End: 2020-11-25

## 2020-11-26 ENCOUNTER — NON-APPOINTMENT (OUTPATIENT)
Age: 80
End: 2020-11-26

## 2020-12-15 ENCOUNTER — APPOINTMENT (OUTPATIENT)
Dept: CARDIOLOGY | Facility: CLINIC | Age: 80
End: 2020-12-15
Payer: MEDICARE

## 2020-12-15 PROCEDURE — 0296T: CPT

## 2021-01-01 NOTE — PATIENT PROFILE ADULT. - EXTENSIONS OF SELF_ADULT
Bedside and Verbal shift change report given to CIERRA Lopez RN (oncoming nurse) by WEST Quezada RN and SN Elroy (offgoing nurse). Report included the following information SBAR, Intake/Output, MAR and Recent Results. Eyeglasses

## 2021-02-08 ENCOUNTER — LABORATORY RESULT (OUTPATIENT)
Age: 81
End: 2021-02-08

## 2021-02-08 ENCOUNTER — APPOINTMENT (OUTPATIENT)
Dept: CARDIOLOGY | Facility: CLINIC | Age: 81
End: 2021-02-08
Payer: MEDICARE

## 2021-02-08 ENCOUNTER — NON-APPOINTMENT (OUTPATIENT)
Age: 81
End: 2021-02-08

## 2021-02-08 VITALS
OXYGEN SATURATION: 100 % | BODY MASS INDEX: 23.92 KG/M2 | HEART RATE: 65 BPM | WEIGHT: 162 LBS | SYSTOLIC BLOOD PRESSURE: 142 MMHG | DIASTOLIC BLOOD PRESSURE: 80 MMHG | TEMPERATURE: 97.8 F

## 2021-02-08 VITALS — DIASTOLIC BLOOD PRESSURE: 70 MMHG | SYSTOLIC BLOOD PRESSURE: 134 MMHG

## 2021-02-08 DIAGNOSIS — E87.5 HYPERKALEMIA: ICD-10-CM

## 2021-02-08 PROCEDURE — 93000 ELECTROCARDIOGRAM COMPLETE: CPT

## 2021-02-08 PROCEDURE — 36415 COLL VENOUS BLD VENIPUNCTURE: CPT

## 2021-02-08 PROCEDURE — 93306 TTE W/DOPPLER COMPLETE: CPT

## 2021-02-08 PROCEDURE — 93040 RHYTHM ECG WITH REPORT: CPT | Mod: 59

## 2021-02-08 PROCEDURE — 99214 OFFICE O/P EST MOD 30 MIN: CPT

## 2021-02-21 ENCOUNTER — NON-APPOINTMENT (OUTPATIENT)
Age: 81
End: 2021-02-21

## 2021-02-21 LAB
ALBUMIN SERPL ELPH-MCNC: 4.5 G/DL
ALP BLD-CCNC: 62 U/L
ALT SERPL-CCNC: 20 U/L
ANION GAP SERPL CALC-SCNC: 12 MMOL/L
AST SERPL-CCNC: 32 U/L
BASOPHILS # BLD AUTO: 0.03 K/UL
BASOPHILS NFR BLD AUTO: 0.4 %
BILIRUB SERPL-MCNC: 0.7 MG/DL
BUN SERPL-MCNC: 19 MG/DL
CALCIUM SERPL-MCNC: 10.1 MG/DL
CHLORIDE SERPL-SCNC: 105 MMOL/L
CHOLEST SERPL-MCNC: 185 MG/DL
CO2 SERPL-SCNC: 24 MMOL/L
CREAT SERPL-MCNC: 1.12 MG/DL
EOSINOPHIL # BLD AUTO: 0.09 K/UL
EOSINOPHIL NFR BLD AUTO: 1.2 %
GLUCOSE SERPL-MCNC: 93 MG/DL
HCT VFR BLD CALC: 43.1 %
HDLC SERPL-MCNC: 92 MG/DL
HGB BLD-MCNC: 13.5 G/DL
IMM GRANULOCYTES NFR BLD AUTO: 0.3 %
LDLC SERPL CALC-MCNC: 85 MG/DL
LDLC SERPL DIRECT ASSAY-MCNC: 89 MG/DL
LYMPHOCYTES # BLD AUTO: 3.34 K/UL
LYMPHOCYTES NFR BLD AUTO: 42.9 %
MAN DIFF?: NORMAL
MCHC RBC-ENTMCNC: 28.5 PG
MCHC RBC-ENTMCNC: 31.3 GM/DL
MCV RBC AUTO: 91.1 FL
MONOCYTES # BLD AUTO: 0.72 K/UL
MONOCYTES NFR BLD AUTO: 9.3 %
NEUTROPHILS # BLD AUTO: 3.58 K/UL
NEUTROPHILS NFR BLD AUTO: 45.9 %
NONHDLC SERPL-MCNC: 94 MG/DL
PLATELET # BLD AUTO: 179 K/UL
POTASSIUM SERPL-SCNC: 4.7 MMOL/L
PROT SERPL-MCNC: 6.7 G/DL
RBC # BLD: 4.73 M/UL
RBC # FLD: 15.2 %
SODIUM SERPL-SCNC: 141 MMOL/L
T3 SERPL-MCNC: 105 NG/DL
T3RU NFR SERPL: 1 TBI
T4 FREE SERPL-MCNC: 1.3 NG/DL
T4 SERPL-MCNC: 8 UG/DL
TRIGL SERPL-MCNC: 44 MG/DL
TSH SERPL-ACNC: 1.07 UIU/ML
WBC # FLD AUTO: 7.78 K/UL

## 2021-05-10 ENCOUNTER — NON-APPOINTMENT (OUTPATIENT)
Age: 81
End: 2021-05-10

## 2021-05-10 ENCOUNTER — APPOINTMENT (OUTPATIENT)
Dept: CARDIOLOGY | Facility: CLINIC | Age: 81
End: 2021-05-10
Payer: MEDICARE

## 2021-05-10 VITALS
OXYGEN SATURATION: 97 % | SYSTOLIC BLOOD PRESSURE: 140 MMHG | HEART RATE: 53 BPM | DIASTOLIC BLOOD PRESSURE: 70 MMHG | TEMPERATURE: 97.8 F | WEIGHT: 162 LBS | BODY MASS INDEX: 23.92 KG/M2

## 2021-05-10 VITALS — SYSTOLIC BLOOD PRESSURE: 140 MMHG | DIASTOLIC BLOOD PRESSURE: 70 MMHG

## 2021-05-10 VITALS — SYSTOLIC BLOOD PRESSURE: 154 MMHG | DIASTOLIC BLOOD PRESSURE: 70 MMHG

## 2021-05-10 PROCEDURE — 93000 ELECTROCARDIOGRAM COMPLETE: CPT

## 2021-05-10 PROCEDURE — 99214 OFFICE O/P EST MOD 30 MIN: CPT

## 2021-05-10 PROCEDURE — 93040 RHYTHM ECG WITH REPORT: CPT | Mod: 59

## 2021-05-10 NOTE — REASON FOR VISIT
[Symptom and Test Evaluation] : symptom and test evaluation [Structural Heart and Valve Disease] : structural heart and valve disease [Hyperlipidemia] : hyperlipidemia [Hypertension] : hypertension

## 2021-05-10 NOTE — HISTORY OF PRESENT ILLNESS
[FreeTextEntry1] : He denies chest pain, shortness of breath, and palpitations.\par BPs at home are 110-125/70.

## 2021-05-21 ENCOUNTER — NON-APPOINTMENT (OUTPATIENT)
Age: 81
End: 2021-05-21

## 2021-07-06 ENCOUNTER — TRANSCRIPTION ENCOUNTER (OUTPATIENT)
Age: 81
End: 2021-07-06

## 2021-08-09 ENCOUNTER — APPOINTMENT (OUTPATIENT)
Dept: CARDIOLOGY | Facility: CLINIC | Age: 81
End: 2021-08-09
Payer: MEDICARE

## 2021-08-09 ENCOUNTER — NON-APPOINTMENT (OUTPATIENT)
Age: 81
End: 2021-08-09

## 2021-08-09 ENCOUNTER — LABORATORY RESULT (OUTPATIENT)
Age: 81
End: 2021-08-09

## 2021-08-09 VITALS
WEIGHT: 160 LBS | DIASTOLIC BLOOD PRESSURE: 80 MMHG | SYSTOLIC BLOOD PRESSURE: 132 MMHG | HEART RATE: 64 BPM | BODY MASS INDEX: 23.63 KG/M2 | OXYGEN SATURATION: 97 %

## 2021-08-09 VITALS — DIASTOLIC BLOOD PRESSURE: 74 MMHG | SYSTOLIC BLOOD PRESSURE: 126 MMHG

## 2021-08-09 PROCEDURE — 93306 TTE W/DOPPLER COMPLETE: CPT

## 2021-08-09 PROCEDURE — 36415 COLL VENOUS BLD VENIPUNCTURE: CPT

## 2021-08-09 PROCEDURE — 93040 RHYTHM ECG WITH REPORT: CPT | Mod: 59

## 2021-08-09 PROCEDURE — 99214 OFFICE O/P EST MOD 30 MIN: CPT

## 2021-08-09 PROCEDURE — 93000 ELECTROCARDIOGRAM COMPLETE: CPT

## 2021-08-09 NOTE — HISTORY OF PRESENT ILLNESS
[FreeTextEntry1] : BPs at home 115-125/70.\par He denies chest pain, shortness of breath, and palpitations.\par \par

## 2021-08-12 LAB
ALBUMIN SERPL ELPH-MCNC: 4.6 G/DL
ALP BLD-CCNC: 64 U/L
ALT SERPL-CCNC: 22 U/L
ANION GAP SERPL CALC-SCNC: 13 MMOL/L
AST SERPL-CCNC: 35 U/L
BASOPHILS # BLD AUTO: 0.03 K/UL
BASOPHILS NFR BLD AUTO: 0.3 %
BILIRUB SERPL-MCNC: 0.8 MG/DL
BUN SERPL-MCNC: 25 MG/DL
CALCIUM SERPL-MCNC: 10.2 MG/DL
CHLORIDE SERPL-SCNC: 104 MMOL/L
CHOLEST SERPL-MCNC: 189 MG/DL
CO2 SERPL-SCNC: 23 MMOL/L
CREAT SERPL-MCNC: 1.08 MG/DL
EOSINOPHIL # BLD AUTO: 0.12 K/UL
EOSINOPHIL NFR BLD AUTO: 1.3 %
GLUCOSE SERPL-MCNC: 92 MG/DL
HCT VFR BLD CALC: 44.7 %
HDLC SERPL-MCNC: 91 MG/DL
HGB BLD-MCNC: 14.5 G/DL
IMM GRANULOCYTES NFR BLD AUTO: 0.3 %
LDLC SERPL CALC-MCNC: 86 MG/DL
LDLC SERPL DIRECT ASSAY-MCNC: 96 MG/DL
LYMPHOCYTES # BLD AUTO: 3.61 K/UL
LYMPHOCYTES NFR BLD AUTO: 40.4 %
MAN DIFF?: NORMAL
MCHC RBC-ENTMCNC: 29.2 PG
MCHC RBC-ENTMCNC: 32.4 GM/DL
MCV RBC AUTO: 90.1 FL
MONOCYTES # BLD AUTO: 0.86 K/UL
MONOCYTES NFR BLD AUTO: 9.6 %
NEUTROPHILS # BLD AUTO: 4.29 K/UL
NEUTROPHILS NFR BLD AUTO: 48.1 %
NONHDLC SERPL-MCNC: 98 MG/DL
PLATELET # BLD AUTO: 206 K/UL
POTASSIUM SERPL-SCNC: 5.1 MMOL/L
PROT SERPL-MCNC: 7 G/DL
RBC # BLD: 4.96 M/UL
RBC # FLD: 15.5 %
SODIUM SERPL-SCNC: 140 MMOL/L
T3 SERPL-MCNC: 92 NG/DL
T3RU NFR SERPL: 1 TBI
T4 FREE SERPL-MCNC: 1.3 NG/DL
T4 SERPL-MCNC: 8 UG/DL
TRIGL SERPL-MCNC: 59 MG/DL
TSH SERPL-ACNC: 0.88 UIU/ML
WBC # FLD AUTO: 8.94 K/UL

## 2021-08-28 ENCOUNTER — NON-APPOINTMENT (OUTPATIENT)
Age: 81
End: 2021-08-28

## 2021-10-07 ENCOUNTER — APPOINTMENT (OUTPATIENT)
Dept: OTOLARYNGOLOGY | Facility: CLINIC | Age: 81
End: 2021-10-07
Payer: MEDICARE

## 2021-10-07 VITALS
SYSTOLIC BLOOD PRESSURE: 144 MMHG | TEMPERATURE: 98 F | DIASTOLIC BLOOD PRESSURE: 79 MMHG | HEART RATE: 57 BPM | WEIGHT: 160 LBS | HEIGHT: 69 IN | BODY MASS INDEX: 23.7 KG/M2

## 2021-10-07 DIAGNOSIS — H61.23 IMPACTED CERUMEN, BILATERAL: ICD-10-CM

## 2021-10-07 DIAGNOSIS — R42 DIZZINESS AND GIDDINESS: ICD-10-CM

## 2021-10-07 PROCEDURE — 92557 COMPREHENSIVE HEARING TEST: CPT

## 2021-10-07 PROCEDURE — 92567 TYMPANOMETRY: CPT

## 2021-10-07 PROCEDURE — 99203 OFFICE O/P NEW LOW 30 MIN: CPT

## 2021-10-08 NOTE — ASSESSMENT
[FreeTextEntry1] : Patient here for wax cleaning, thinks he has cerumen impaction, otherwise doing well \par \par On examination, there is no wax noted \par \par Bilateral SNHL:\par -cleared for hearing aids\par -Hearing Test performed to evaluate the extent of hearing loss and to explain pt's symptoms\par \par \par f/u prn

## 2021-10-08 NOTE — HISTORY OF PRESENT ILLNESS
[de-identified] : Patient here for wax cleaning, thinks he has impacted cerumen. Otherwise doing well. Does not notice any hearing loss. Pt has no ear pain, ear drainage, hearing loss, tinnitus, vertigo, nasal congestion, nasal discharge, epistaxis, sinus infections, facial pain, facial pressure, throat pain, dysphagia or fevers\par \par

## 2021-10-08 NOTE — DATA REVIEWED
[de-identified] : Hearing Test performed to evaluate the extent of hearing loss and  to explain pt's symptoms\par today's hearing test was personally reviewed and revealed\par mild to mod-severe SNHL AU

## 2021-10-08 NOTE — END OF VISIT
[Time Spent: ___ minutes] : I have spent [unfilled] minutes of time on the encounter. [FreeTextEntry3] : I personally saw and examined NAVJOT JOHN in detail. I spoke to MARIZA Ramey regarding the assessment and plan of care. I reviewed the above assessment and plan of care, and agree. I have made changes in changes in the body of the note where appropriate.I personally reviewed the HPI, PMH, FH, SH, ROS and medications/allergies. I have spoken to MARIZA Ramey regarding the history and have personally determined the assessment and plan of care, and documented this myself. I was present and participated in all key portions of the encounter and all procedures noted above. I have made changes in the body of the note where appropriate.\par \par Attesting Faculty: See Attending Signature Below \par \par \par

## 2021-11-08 ENCOUNTER — NON-APPOINTMENT (OUTPATIENT)
Age: 81
End: 2021-11-08

## 2021-11-08 ENCOUNTER — APPOINTMENT (OUTPATIENT)
Dept: CARDIOLOGY | Facility: CLINIC | Age: 81
End: 2021-11-08
Payer: MEDICARE

## 2021-11-08 VITALS
WEIGHT: 160 LBS | HEART RATE: 71 BPM | DIASTOLIC BLOOD PRESSURE: 62 MMHG | SYSTOLIC BLOOD PRESSURE: 120 MMHG | BODY MASS INDEX: 23.63 KG/M2 | OXYGEN SATURATION: 96 %

## 2021-11-08 VITALS — DIASTOLIC BLOOD PRESSURE: 81 MMHG | SYSTOLIC BLOOD PRESSURE: 124 MMHG

## 2021-11-08 PROCEDURE — 99214 OFFICE O/P EST MOD 30 MIN: CPT

## 2021-11-08 PROCEDURE — 93040 RHYTHM ECG WITH REPORT: CPT | Mod: 59

## 2021-11-08 PROCEDURE — 93000 ELECTROCARDIOGRAM COMPLETE: CPT

## 2021-11-10 ENCOUNTER — NON-APPOINTMENT (OUTPATIENT)
Age: 81
End: 2021-11-10
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
## 2022-02-08 ENCOUNTER — LABORATORY RESULT (OUTPATIENT)
Age: 82
End: 2022-02-08

## 2022-02-08 ENCOUNTER — NON-APPOINTMENT (OUTPATIENT)
Age: 82
End: 2022-02-08

## 2022-02-08 ENCOUNTER — APPOINTMENT (OUTPATIENT)
Dept: CARDIOLOGY | Facility: CLINIC | Age: 82
End: 2022-02-08
Payer: MEDICARE

## 2022-02-08 VITALS
BODY MASS INDEX: 23.92 KG/M2 | OXYGEN SATURATION: 96 % | SYSTOLIC BLOOD PRESSURE: 110 MMHG | DIASTOLIC BLOOD PRESSURE: 70 MMHG | WEIGHT: 162 LBS | HEART RATE: 59 BPM

## 2022-02-08 VITALS — DIASTOLIC BLOOD PRESSURE: 66 MMHG | SYSTOLIC BLOOD PRESSURE: 124 MMHG

## 2022-02-08 PROCEDURE — 93040 RHYTHM ECG WITH REPORT: CPT | Mod: 59

## 2022-02-08 PROCEDURE — 99214 OFFICE O/P EST MOD 30 MIN: CPT

## 2022-02-08 PROCEDURE — 93000 ELECTROCARDIOGRAM COMPLETE: CPT

## 2022-02-08 PROCEDURE — 36415 COLL VENOUS BLD VENIPUNCTURE: CPT

## 2022-02-08 NOTE — HISTORY OF PRESENT ILLNESS
[FreeTextEntry1] : 11/8/2021 - Office visit:\par He denies chest pain, shortness of breath, and palpitations.\par \par 02/08/2022 - Office visit:\par He denies chest pain, shortness of breath, palpitations, and dizziness.\par He is expecting his second great grandchild.\par \par \par

## 2022-02-13 LAB
ALBUMIN SERPL ELPH-MCNC: 4.3 G/DL
ALP BLD-CCNC: 58 U/L
ALT SERPL-CCNC: 25 U/L
ANION GAP SERPL CALC-SCNC: 13 MMOL/L
AST SERPL-CCNC: 37 U/L
BASOPHILS # BLD AUTO: 0.04 K/UL
BASOPHILS NFR BLD AUTO: 0.6 %
BILIRUB SERPL-MCNC: 0.4 MG/DL
BUN SERPL-MCNC: 27 MG/DL
CALCIUM SERPL-MCNC: 10 MG/DL
CHLORIDE SERPL-SCNC: 105 MMOL/L
CHOLEST SERPL-MCNC: 164 MG/DL
CO2 SERPL-SCNC: 22 MMOL/L
CREAT SERPL-MCNC: 1.12 MG/DL
EOSINOPHIL # BLD AUTO: 0.1 K/UL
EOSINOPHIL NFR BLD AUTO: 1.4 %
GLUCOSE SERPL-MCNC: 104 MG/DL
HCT VFR BLD CALC: 40.1 %
HDLC SERPL-MCNC: 89 MG/DL
HGB BLD-MCNC: 12.9 G/DL
IMM GRANULOCYTES NFR BLD AUTO: 0.1 %
LDLC SERPL CALC-MCNC: 71 MG/DL
LDLC SERPL DIRECT ASSAY-MCNC: 73 MG/DL
LYMPHOCYTES # BLD AUTO: 3.36 K/UL
LYMPHOCYTES NFR BLD AUTO: 46.2 %
MAN DIFF?: NORMAL
MCHC RBC-ENTMCNC: 28.6 PG
MCHC RBC-ENTMCNC: 32.2 GM/DL
MCV RBC AUTO: 88.9 FL
MONOCYTES # BLD AUTO: 0.68 K/UL
MONOCYTES NFR BLD AUTO: 9.4 %
NEUTROPHILS # BLD AUTO: 3.08 K/UL
NEUTROPHILS NFR BLD AUTO: 42.3 %
NONHDLC SERPL-MCNC: 75 MG/DL
PLATELET # BLD AUTO: 165 K/UL
POTASSIUM SERPL-SCNC: 4.8 MMOL/L
PROT SERPL-MCNC: 6.2 G/DL
RBC # BLD: 4.51 M/UL
RBC # FLD: 15.9 %
SODIUM SERPL-SCNC: 140 MMOL/L
T3 SERPL-MCNC: 102 NG/DL
T3RU NFR SERPL: 1.1 TBI
T4 FREE SERPL-MCNC: 1.4 NG/DL
T4 SERPL-MCNC: 7.5 UG/DL
TRIGL SERPL-MCNC: 19 MG/DL
TSH SERPL-ACNC: 1.27 UIU/ML
WBC # FLD AUTO: 7.27 K/UL

## 2022-02-16 ENCOUNTER — NON-APPOINTMENT (OUTPATIENT)
Age: 82
End: 2022-02-16

## 2022-05-23 ENCOUNTER — NON-APPOINTMENT (OUTPATIENT)
Age: 82
End: 2022-05-23

## 2022-05-23 ENCOUNTER — LABORATORY RESULT (OUTPATIENT)
Age: 82
End: 2022-05-23

## 2022-05-23 ENCOUNTER — APPOINTMENT (OUTPATIENT)
Dept: CARDIOLOGY | Facility: CLINIC | Age: 82
End: 2022-05-23
Payer: MEDICARE

## 2022-05-23 VITALS
WEIGHT: 163 LBS | DIASTOLIC BLOOD PRESSURE: 70 MMHG | BODY MASS INDEX: 24.14 KG/M2 | SYSTOLIC BLOOD PRESSURE: 110 MMHG | HEART RATE: 50 BPM | OXYGEN SATURATION: 96 % | HEIGHT: 69 IN

## 2022-05-23 VITALS — SYSTOLIC BLOOD PRESSURE: 122 MMHG | DIASTOLIC BLOOD PRESSURE: 62 MMHG

## 2022-05-23 DIAGNOSIS — I47.1 SUPRAVENTRICULAR TACHYCARDIA: ICD-10-CM

## 2022-05-23 PROCEDURE — 99214 OFFICE O/P EST MOD 30 MIN: CPT

## 2022-05-23 PROCEDURE — 93000 ELECTROCARDIOGRAM COMPLETE: CPT

## 2022-05-23 PROCEDURE — 36415 COLL VENOUS BLD VENIPUNCTURE: CPT

## 2022-05-23 PROCEDURE — 93040 RHYTHM ECG WITH REPORT: CPT | Mod: 59

## 2022-05-23 NOTE — HISTORY OF PRESENT ILLNESS
[FreeTextEntry1] : 11/8/2021 - Office visit:\par He denies chest pain, shortness of breath, and palpitations.\par \par 02/08/2022 - Office visit:\par He denies chest pain, shortness of breath, palpitations, and dizziness.\par He is expecting his second great grandchild.\par \par 05/23/2022 - Office visit:\par He denies chest pain, shortness of breath, palpitations, and dizziness.\par \par \par

## 2022-05-28 ENCOUNTER — NON-APPOINTMENT (OUTPATIENT)
Age: 82
End: 2022-05-28

## 2022-06-22 ENCOUNTER — APPOINTMENT (OUTPATIENT)
Dept: CARDIOLOGY | Facility: CLINIC | Age: 82
End: 2022-06-22

## 2022-06-22 PROCEDURE — 93306 TTE W/DOPPLER COMPLETE: CPT

## 2022-07-04 ENCOUNTER — NON-APPOINTMENT (OUTPATIENT)
Age: 82
End: 2022-07-04

## 2022-07-10 LAB
ALBUMIN SERPL ELPH-MCNC: 4.5 G/DL
ALP BLD-CCNC: 57 U/L
ALT SERPL-CCNC: 23 U/L
ANION GAP SERPL CALC-SCNC: 10 MMOL/L
AST SERPL-CCNC: 34 U/L
BASOPHILS # BLD AUTO: 0.03 K/UL
BASOPHILS NFR BLD AUTO: 0.4 %
BILIRUB SERPL-MCNC: 0.8 MG/DL
BUN SERPL-MCNC: 22 MG/DL
CALCIUM SERPL-MCNC: 9.8 MG/DL
CHLORIDE SERPL-SCNC: 107 MMOL/L
CHOLEST SERPL-MCNC: 186 MG/DL
CO2 SERPL-SCNC: 24 MMOL/L
CREAT SERPL-MCNC: 1.04 MG/DL
EGFR: 72 ML/MIN/1.73M2
EOSINOPHIL # BLD AUTO: 0.09 K/UL
EOSINOPHIL NFR BLD AUTO: 1.3 %
HCT VFR BLD CALC: 43 %
HDLC SERPL-MCNC: 90 MG/DL
HGB BLD-MCNC: 13.3 G/DL
IMM GRANULOCYTES NFR BLD AUTO: 0.3 %
LDLC SERPL CALC-MCNC: 87 MG/DL
LDLC SERPL DIRECT ASSAY-MCNC: 87 MG/DL
LYMPHOCYTES # BLD AUTO: 2.86 K/UL
LYMPHOCYTES NFR BLD AUTO: 40.7 %
MAGNESIUM SERPL-MCNC: 2 MG/DL
MAN DIFF?: NORMAL
MCHC RBC-ENTMCNC: 28.7 PG
MCHC RBC-ENTMCNC: 30.9 GM/DL
MCV RBC AUTO: 92.7 FL
MONOCYTES # BLD AUTO: 0.75 K/UL
MONOCYTES NFR BLD AUTO: 10.7 %
NEUTROPHILS # BLD AUTO: 3.27 K/UL
NEUTROPHILS NFR BLD AUTO: 46.6 %
NONHDLC SERPL-MCNC: 95 MG/DL
PLATELET # BLD AUTO: 194 K/UL
POTASSIUM SERPL-SCNC: 5.1 MMOL/L
PROT SERPL-MCNC: 6.7 G/DL
RBC # BLD: 4.64 M/UL
RBC # FLD: 15.8 %
SODIUM SERPL-SCNC: 141 MMOL/L
T3 SERPL-MCNC: 90 NG/DL
T3RU NFR SERPL: 1 TBI
T4 SERPL-MCNC: 7.9 UG/DL
TRIGL SERPL-MCNC: 41 MG/DL
TSH SERPL-ACNC: 1.12 UIU/ML
WBC # FLD AUTO: 7.02 K/UL

## 2022-08-29 ENCOUNTER — NON-APPOINTMENT (OUTPATIENT)
Age: 82
End: 2022-08-29

## 2022-08-29 ENCOUNTER — APPOINTMENT (OUTPATIENT)
Dept: CARDIOLOGY | Facility: CLINIC | Age: 82
End: 2022-08-29

## 2022-08-29 VITALS — DIASTOLIC BLOOD PRESSURE: 68 MMHG | SYSTOLIC BLOOD PRESSURE: 138 MMHG

## 2022-08-29 VITALS
BODY MASS INDEX: 23.78 KG/M2 | OXYGEN SATURATION: 97 % | SYSTOLIC BLOOD PRESSURE: 140 MMHG | HEART RATE: 69 BPM | WEIGHT: 161 LBS | DIASTOLIC BLOOD PRESSURE: 80 MMHG

## 2022-08-29 PROCEDURE — 93040 RHYTHM ECG WITH REPORT: CPT | Mod: 59

## 2022-08-29 PROCEDURE — 93000 ELECTROCARDIOGRAM COMPLETE: CPT

## 2022-08-29 PROCEDURE — 99214 OFFICE O/P EST MOD 30 MIN: CPT

## 2022-08-29 NOTE — HISTORY OF PRESENT ILLNESS
[FreeTextEntry1] : 11/8/2021 - Office visit:\par He denies chest pain, shortness of breath, and palpitations.\par \par 02/08/2022 - Office visit:\par He denies chest pain, shortness of breath, palpitations, and dizziness.\par He is expecting his second great grandchild.\par \par 05/23/2022 - Office visit:\par He denies chest pain, shortness of breath, palpitations, and dizziness.\par \par 08/29/2022 - Office visit:\par He will be seeing Dr. Guevara for pain radiating down his lateral right lower extremity after sitting and for numbness/tingling in his right upper extremity after he wakes up.\par He plays golf 2-3 times per week and treads water in the pool.\par At home, his blood pressures are 117-125/70-75.\par He denies chest pain, shortness of breath, and palpitations.\par \par \par

## 2022-08-31 ENCOUNTER — NON-APPOINTMENT (OUTPATIENT)
Age: 82
End: 2022-08-31

## 2022-08-31 ENCOUNTER — APPOINTMENT (OUTPATIENT)
Dept: ORTHOPEDIC SURGERY | Facility: CLINIC | Age: 82
End: 2022-08-31

## 2022-08-31 VITALS
HEIGHT: 69 IN | SYSTOLIC BLOOD PRESSURE: 106 MMHG | BODY MASS INDEX: 23.7 KG/M2 | HEART RATE: 51 BPM | WEIGHT: 160 LBS | DIASTOLIC BLOOD PRESSURE: 72 MMHG

## 2022-08-31 DIAGNOSIS — Z98.890 OTHER SPECIFIED POSTPROCEDURAL STATES: ICD-10-CM

## 2022-08-31 DIAGNOSIS — M70.61 TROCHANTERIC BURSITIS, RIGHT HIP: ICD-10-CM

## 2022-08-31 DIAGNOSIS — M54.16 RADICULOPATHY, LUMBAR REGION: ICD-10-CM

## 2022-08-31 PROCEDURE — 99204 OFFICE O/P NEW MOD 45 MIN: CPT

## 2022-08-31 PROCEDURE — 72170 X-RAY EXAM OF PELVIS: CPT

## 2022-08-31 PROCEDURE — 72050 X-RAY EXAM NECK SPINE 4/5VWS: CPT

## 2022-08-31 PROCEDURE — 72110 X-RAY EXAM L-2 SPINE 4/>VWS: CPT

## 2022-08-31 NOTE — HISTORY OF PRESENT ILLNESS
[7] : a current pain level of 7/10 [Daily] : ~He/She~ states the symptoms seem to be occuring daily [de-identified] : Patient is here today for evaluation on his right arm pain numbness tingling especially when sleeping and also re evaluation on his low back right leg pain weakness worse also with sitting to standing to walking this going on for 1-2 months no known injury and not medically treated for these issues. \par Some pain at night wakes him up from discomfort\par sleeps in fetal position, some symptoms down right arm\par Still plays golf, walks, swims\par Planted a garden over the summer\par Symptoms mostly getting up from sitting [de-identified] : sleeping  sitting to standing to walking [de-identified] : being active

## 2022-08-31 NOTE — PHYSICAL EXAM
[Stooped] : stooped [Knee] : patellar 2+ and symmetric bilaterally [Ankle] : ankle 2+ and symmetric bilaterally [DP] : dorsalis pedis 2+ and symmetric bilaterally [SLR] : negative straight leg raise [UE/LE] : Sensory: Intact in bilateral upper & lower extremities [Bicep] : biceps 2+ and symmetric bilaterally [B.R.] : biceps 2+ and symmetric bilaterally [de-identified] : Well-healed midline lumbar incision.  No significant midline or paraspinal lumbar tenderness.\par No rashes or ecchymotic lesions over the trunk or lower extremities.  Normal extremity edema. [de-identified] : Minimal right greater trochanteric tenderness compared to the left side. [de-identified] : 4 views cervical spine demonstrate no significant scoliosis.  Normal cervical lordosis.  Degenerative changes seen at C3-4 C5-6 and C6-7 with no dynamic instability between flexion-extension.  No acute fractures.  Loss of disc height endplate sclerosis and small anterior posterior osteophytes noted at the levels of degeneration.\par \par 4 views lumbar spine obtained today were compared with x-rays obtained in 2018.  No significant interval change noted.  Normal alignment in the AP projection with increased thoracolumbar junctional kyphosis is unchanged.  Previously noted T12 fracture is again seen without interval change.  No new fractures noted.  Degenerative changes most prominent at L3-4 and L2-3 L1-2 with loss of disc height endplate sclerosis and anterior osteophytes.\par \par AP pelvis demonstrates bilateral hip arthroplasty implants in place which are partially visualized.  Calcification of the pelvis.  Unchanged no new findings or fractures.

## 2022-08-31 NOTE — DISCUSSION/SUMMARY
[Medication Risks Reviewed] : Medication risks reviewed [de-identified] : Patient reported doing some gardening approximately 2 3 months ago which could have been the trigger for his right lateral thigh symptoms.  There is a question of radiculopathy versus trochanteric bursitis.  No focal straight leg raise testing irritability noted today and no obvious neurologic deficits when compared to the right side to the left with intact reflexes and symmetric strength in light touch sensation.  At this time recommend a course of physical therapy.  He is not interested in medications where gabapentin trial might be helpful.  Trial of diclofenac suggested to apply over the right hip (trochanter.  Recommended follow-up in 2 to 4 to 6 weeks.  If symptoms persist or worsen MRI of the lumbar spine and/or pelvis may be considered.  He continues to be active and plays golf without any significant back pain.  His primary complaint is 1 changing positions from a seated to a standing position which suggests possible soft tissue involvement around the right hip.\par \par He has reported some discomfort in his right thumb with sleeping.  This could be related to the degenerative changes seen on x-rays of the cervical spine.  For now no specific intervention is indicated.  If his symptoms persist or worsen MRI cervical spine may be considered in the future as well.\par \par The patient was educated regarding their condition, treatment options as well as prescribed course of treatment. \par Risks and benefits as well as alternatives to the proposed treatment were also provided to the patient \par They were given the opportunity to have all their questions answered to their satisfaction.\par \par Vital signs were reviewed with the patient and the patient was instructed to followup with their primary care provider for further management. There were no PAs or scribes used in the evaluation, exam or treatment plan discussion. The surgeon was the primary evaluating or treating physician as noted above.

## 2022-09-06 RX ORDER — DICLOFENAC SODIUM 3 G/100G
3 GEL TOPICAL TWICE DAILY
Qty: 1 | Refills: 0 | Status: ACTIVE | COMMUNITY
Start: 2022-08-31

## 2022-12-05 ENCOUNTER — NON-APPOINTMENT (OUTPATIENT)
Age: 82
End: 2022-12-05

## 2022-12-05 ENCOUNTER — APPOINTMENT (OUTPATIENT)
Dept: CARDIOLOGY | Facility: CLINIC | Age: 82
End: 2022-12-05

## 2022-12-05 VITALS
WEIGHT: 161 LBS | BODY MASS INDEX: 23.78 KG/M2 | DIASTOLIC BLOOD PRESSURE: 84 MMHG | HEART RATE: 64 BPM | SYSTOLIC BLOOD PRESSURE: 156 MMHG | OXYGEN SATURATION: 98 %

## 2022-12-05 VITALS — SYSTOLIC BLOOD PRESSURE: 140 MMHG | DIASTOLIC BLOOD PRESSURE: 70 MMHG

## 2022-12-05 PROCEDURE — 93306 TTE W/DOPPLER COMPLETE: CPT

## 2022-12-05 PROCEDURE — 93000 ELECTROCARDIOGRAM COMPLETE: CPT

## 2022-12-05 PROCEDURE — 93040 RHYTHM ECG WITH REPORT: CPT | Mod: 59

## 2022-12-05 PROCEDURE — 99214 OFFICE O/P EST MOD 30 MIN: CPT

## 2022-12-05 NOTE — HISTORY OF PRESENT ILLNESS
[FreeTextEntry1] : 11/8/2021 - Office visit:\par He denies chest pain, shortness of breath, and palpitations.\par \par 02/08/2022 - Office visit:\par He denies chest pain, shortness of breath, palpitations, and dizziness.\par He is expecting his second great grandchild.\par \par 05/23/2022 - Office visit:\par He denies chest pain, shortness of breath, palpitations, and dizziness.\par \par 08/29/2022 - Office visit:\par He will be seeing Dr. Guevara for pain radiating down his lateral right lower extremity after sitting and for numbness/tingling in his right upper extremity after he wakes up.\par He plays golf 2-3 times per week and treads water in the pool.\par At home, his blood pressures are 117-125/70-75.\par He denies chest pain, shortness of breath, and palpitations.\par \par 12/05/2022 - Office visit:\par Blood pressures at home are 117-128/68-80.\par He denies chest pain, shortness of breath, palpitations, and dizziness.\par Significant stress.\par \par \par \par

## 2022-12-10 ENCOUNTER — NON-APPOINTMENT (OUTPATIENT)
Age: 82
End: 2022-12-10

## 2023-03-06 ENCOUNTER — LABORATORY RESULT (OUTPATIENT)
Age: 83
End: 2023-03-06

## 2023-03-06 ENCOUNTER — NON-APPOINTMENT (OUTPATIENT)
Age: 83
End: 2023-03-06

## 2023-03-06 ENCOUNTER — APPOINTMENT (OUTPATIENT)
Dept: CARDIOLOGY | Facility: CLINIC | Age: 83
End: 2023-03-06
Payer: MEDICARE

## 2023-03-06 VITALS — SYSTOLIC BLOOD PRESSURE: 152 MMHG | DIASTOLIC BLOOD PRESSURE: 80 MMHG

## 2023-03-06 VITALS
HEART RATE: 65 BPM | WEIGHT: 162 LBS | BODY MASS INDEX: 23.92 KG/M2 | SYSTOLIC BLOOD PRESSURE: 122 MMHG | DIASTOLIC BLOOD PRESSURE: 62 MMHG | OXYGEN SATURATION: 97 %

## 2023-03-06 PROCEDURE — 36415 COLL VENOUS BLD VENIPUNCTURE: CPT

## 2023-03-06 PROCEDURE — 93040 RHYTHM ECG WITH REPORT: CPT | Mod: 59

## 2023-03-06 PROCEDURE — 93000 ELECTROCARDIOGRAM COMPLETE: CPT

## 2023-03-06 PROCEDURE — 99214 OFFICE O/P EST MOD 30 MIN: CPT

## 2023-03-06 NOTE — HISTORY OF PRESENT ILLNESS
[FreeTextEntry1] : 11/8/2021 - Office visit:\par He denies chest pain, shortness of breath, and palpitations.\par \par 02/08/2022 - Office visit:\par He denies chest pain, shortness of breath, palpitations, and dizziness.\par He is expecting his second great grandchild.\par \par 05/23/2022 - Office visit:\par He denies chest pain, shortness of breath, palpitations, and dizziness.\par \par 08/29/2022 - Office visit:\par He will be seeing Dr. Guevara for pain radiating down his lateral right lower extremity after sitting and for numbness/tingling in his right upper extremity after he wakes up.\par He plays golf 2-3 times per week and treads water in the pool.\par At home, his blood pressures are 117-125/70-75.\par He denies chest pain, shortness of breath, and palpitations.\par \par 12/05/2022 - Office visit:\par Blood pressures at home are 117-128/68-80.\par He denies chest pain, shortness of breath, palpitations, and dizziness.\par Significant stress.\par \par 03/06/2023 - Office visit:\par His blood pressures at home are 122-128/74-80; he checks 2-3 times per week.  His blood pressure yesterday was 124/74.  Heart rates are 56-62.\par He denies chest pain, shortness of breath, palpitations, and dizziness.\par \par \par \par

## 2023-03-08 ENCOUNTER — EMERGENCY (EMERGENCY)
Facility: HOSPITAL | Age: 83
LOS: 1 days | Discharge: HOME | End: 2023-03-08
Attending: EMERGENCY MEDICINE
Payer: MEDICARE

## 2023-03-08 VITALS
DIASTOLIC BLOOD PRESSURE: 63 MMHG | SYSTOLIC BLOOD PRESSURE: 121 MMHG | OXYGEN SATURATION: 99 % | RESPIRATION RATE: 18 BRPM | HEART RATE: 63 BPM

## 2023-03-08 VITALS
HEIGHT: 69 IN | WEIGHT: 160.06 LBS | TEMPERATURE: 98 F | DIASTOLIC BLOOD PRESSURE: 88 MMHG | SYSTOLIC BLOOD PRESSURE: 150 MMHG | RESPIRATION RATE: 19 BRPM | OXYGEN SATURATION: 97 % | HEART RATE: 62 BPM

## 2023-03-08 DIAGNOSIS — R42 DIZZINESS AND GIDDINESS: ICD-10-CM

## 2023-03-08 DIAGNOSIS — Z90.89 ACQUIRED ABSENCE OF OTHER ORGANS: Chronic | ICD-10-CM

## 2023-03-08 DIAGNOSIS — Z90.49 ACQUIRED ABSENCE OF OTHER SPECIFIED PARTS OF DIGESTIVE TRACT: Chronic | ICD-10-CM

## 2023-03-08 DIAGNOSIS — I49.3 VENTRICULAR PREMATURE DEPOLARIZATION: ICD-10-CM

## 2023-03-08 DIAGNOSIS — Z98.41 CATARACT EXTRACTION STATUS, RIGHT EYE: Chronic | ICD-10-CM

## 2023-03-08 DIAGNOSIS — I34.1 NONRHEUMATIC MITRAL (VALVE) PROLAPSE: ICD-10-CM

## 2023-03-08 DIAGNOSIS — Z96.643 PRESENCE OF ARTIFICIAL HIP JOINT, BILATERAL: Chronic | ICD-10-CM

## 2023-03-08 LAB
ALBUMIN SERPL ELPH-MCNC: 4.2 G/DL — SIGNIFICANT CHANGE UP (ref 3.3–5)
ALBUMIN SERPL ELPH-MCNC: 4.6 G/DL
ALP BLD-CCNC: 60 U/L
ALP SERPL-CCNC: 55 U/L — SIGNIFICANT CHANGE UP (ref 40–120)
ALT FLD-CCNC: 19 U/L — SIGNIFICANT CHANGE UP (ref 10–45)
ALT SERPL-CCNC: 21 U/L
ANION GAP SERPL CALC-SCNC: 12 MMOL/L
ANION GAP SERPL CALC-SCNC: 14 MMOL/L — SIGNIFICANT CHANGE UP (ref 5–17)
APPEARANCE UR: CLEAR — SIGNIFICANT CHANGE UP
AST SERPL-CCNC: 32 U/L
AST SERPL-CCNC: 34 U/L — SIGNIFICANT CHANGE UP (ref 10–40)
BASE EXCESS BLDV CALC-SCNC: 0.1 MMOL/L — SIGNIFICANT CHANGE UP (ref -2–3)
BASOPHILS # BLD AUTO: 0.02 K/UL — SIGNIFICANT CHANGE UP (ref 0–0.2)
BASOPHILS # BLD AUTO: 0.03 K/UL
BASOPHILS NFR BLD AUTO: 0.3 % — SIGNIFICANT CHANGE UP (ref 0–2)
BASOPHILS NFR BLD AUTO: 0.4 %
BILIRUB SERPL-MCNC: 0.7 MG/DL — SIGNIFICANT CHANGE UP (ref 0.2–1.2)
BILIRUB SERPL-MCNC: 0.8 MG/DL
BILIRUB UR-MCNC: NEGATIVE — SIGNIFICANT CHANGE UP
BUN SERPL-MCNC: 20 MG/DL
BUN SERPL-MCNC: 22 MG/DL — SIGNIFICANT CHANGE UP (ref 7–23)
CA-I SERPL-SCNC: 1.2 MMOL/L — SIGNIFICANT CHANGE UP (ref 1.15–1.33)
CALCIUM SERPL-MCNC: 10.2 MG/DL
CALCIUM SERPL-MCNC: 9.6 MG/DL — SIGNIFICANT CHANGE UP (ref 8.4–10.5)
CHLORIDE BLDV-SCNC: 104 MMOL/L — SIGNIFICANT CHANGE UP (ref 96–108)
CHLORIDE SERPL-SCNC: 103 MMOL/L — SIGNIFICANT CHANGE UP (ref 96–108)
CHLORIDE SERPL-SCNC: 108 MMOL/L
CHOLEST SERPL-MCNC: 181 MG/DL
CO2 BLDV-SCNC: 25 MMOL/L — SIGNIFICANT CHANGE UP (ref 22–26)
CO2 SERPL-SCNC: 21 MMOL/L — LOW (ref 22–31)
CO2 SERPL-SCNC: 22 MMOL/L
COLOR SPEC: YELLOW — SIGNIFICANT CHANGE UP
CREAT SERPL-MCNC: 0.93 MG/DL — SIGNIFICANT CHANGE UP (ref 0.5–1.3)
CREAT SERPL-MCNC: 1.02 MG/DL
DIFF PNL FLD: NEGATIVE — SIGNIFICANT CHANGE UP
EGFR: 73 ML/MIN/1.73M2
EGFR: 81 ML/MIN/1.73M2 — SIGNIFICANT CHANGE UP
EOSINOPHIL # BLD AUTO: 0.07 K/UL
EOSINOPHIL # BLD AUTO: 0.08 K/UL — SIGNIFICANT CHANGE UP (ref 0–0.5)
EOSINOPHIL NFR BLD AUTO: 0.9 %
EOSINOPHIL NFR BLD AUTO: 1 % — SIGNIFICANT CHANGE UP (ref 0–6)
FLUAV AG NPH QL: SIGNIFICANT CHANGE UP
FLUBV AG NPH QL: SIGNIFICANT CHANGE UP
GAS PNL BLDV: 135 MMOL/L — LOW (ref 136–145)
GAS PNL BLDV: SIGNIFICANT CHANGE UP
GLUCOSE BLDV-MCNC: 120 MG/DL — HIGH (ref 70–99)
GLUCOSE SERPL-MCNC: 101 MG/DL
GLUCOSE SERPL-MCNC: 121 MG/DL — HIGH (ref 70–99)
GLUCOSE UR QL: NEGATIVE — SIGNIFICANT CHANGE UP
HCO3 BLDV-SCNC: 24 MMOL/L — SIGNIFICANT CHANGE UP (ref 22–29)
HCT VFR BLD CALC: 40.4 % — SIGNIFICANT CHANGE UP (ref 39–50)
HCT VFR BLD CALC: 43.4 %
HCT VFR BLDA CALC: 40 % — SIGNIFICANT CHANGE UP (ref 39–51)
HDLC SERPL-MCNC: 85 MG/DL
HGB BLD CALC-MCNC: 13.2 G/DL — SIGNIFICANT CHANGE UP (ref 12.6–17.4)
HGB BLD-MCNC: 13.2 G/DL — SIGNIFICANT CHANGE UP (ref 13–17)
HGB BLD-MCNC: 13.7 G/DL
IMM GRANULOCYTES NFR BLD AUTO: 0.1 % — SIGNIFICANT CHANGE UP (ref 0–0.9)
IMM GRANULOCYTES NFR BLD AUTO: 0.3 %
KETONES UR-MCNC: ABNORMAL
LACTATE BLDV-MCNC: 2.3 MMOL/L — HIGH (ref 0.5–2)
LDLC SERPL CALC-MCNC: 86 MG/DL
LDLC SERPL DIRECT ASSAY-MCNC: 90 MG/DL
LEUKOCYTE ESTERASE UR-ACNC: NEGATIVE — SIGNIFICANT CHANGE UP
LYMPHOCYTES # BLD AUTO: 2.71 K/UL
LYMPHOCYTES # BLD AUTO: 3.73 K/UL — HIGH (ref 1–3.3)
LYMPHOCYTES # BLD AUTO: 47.1 % — HIGH (ref 13–44)
LYMPHOCYTES NFR BLD AUTO: 36 %
MAGNESIUM SERPL-MCNC: 1.7 MG/DL — SIGNIFICANT CHANGE UP (ref 1.6–2.6)
MAN DIFF?: NORMAL
MCHC RBC-ENTMCNC: 28.5 PG
MCHC RBC-ENTMCNC: 28.7 PG — SIGNIFICANT CHANGE UP (ref 27–34)
MCHC RBC-ENTMCNC: 31.6 GM/DL
MCHC RBC-ENTMCNC: 32.7 GM/DL — SIGNIFICANT CHANGE UP (ref 32–36)
MCV RBC AUTO: 87.8 FL — SIGNIFICANT CHANGE UP (ref 80–100)
MCV RBC AUTO: 90.2 FL
MONOCYTES # BLD AUTO: 0.65 K/UL — SIGNIFICANT CHANGE UP (ref 0–0.9)
MONOCYTES # BLD AUTO: 0.74 K/UL
MONOCYTES NFR BLD AUTO: 8.2 % — SIGNIFICANT CHANGE UP (ref 2–14)
MONOCYTES NFR BLD AUTO: 9.8 %
NEUTROPHILS # BLD AUTO: 3.43 K/UL — SIGNIFICANT CHANGE UP (ref 1.8–7.4)
NEUTROPHILS # BLD AUTO: 3.96 K/UL
NEUTROPHILS NFR BLD AUTO: 43.3 % — SIGNIFICANT CHANGE UP (ref 43–77)
NEUTROPHILS NFR BLD AUTO: 52.6 %
NITRITE UR-MCNC: NEGATIVE — SIGNIFICANT CHANGE UP
NONHDLC SERPL-MCNC: 95 MG/DL
NRBC # BLD: 0 /100 WBCS — SIGNIFICANT CHANGE UP (ref 0–0)
PCO2 BLDV: 34 MMHG — LOW (ref 42–55)
PH BLDV: 7.45 — HIGH (ref 7.32–7.43)
PH UR: 7 — SIGNIFICANT CHANGE UP (ref 5–8)
PHOSPHATE SERPL-MCNC: 1.7 MG/DL — LOW (ref 2.5–4.5)
PLATELET # BLD AUTO: 152 K/UL — SIGNIFICANT CHANGE UP (ref 150–400)
PLATELET # BLD AUTO: 166 K/UL
PO2 BLDV: 29 MMHG — SIGNIFICANT CHANGE UP (ref 25–45)
POTASSIUM BLDV-SCNC: 3.7 MMOL/L — SIGNIFICANT CHANGE UP (ref 3.5–5.1)
POTASSIUM SERPL-MCNC: 3.7 MMOL/L — SIGNIFICANT CHANGE UP (ref 3.5–5.3)
POTASSIUM SERPL-SCNC: 3.7 MMOL/L — SIGNIFICANT CHANGE UP (ref 3.5–5.3)
POTASSIUM SERPL-SCNC: 4.7 MMOL/L
PROT SERPL-MCNC: 6.6 G/DL — SIGNIFICANT CHANGE UP (ref 6–8.3)
PROT SERPL-MCNC: 6.9 G/DL
PROT UR-MCNC: NEGATIVE — SIGNIFICANT CHANGE UP
RBC # BLD: 4.6 M/UL — SIGNIFICANT CHANGE UP (ref 4.2–5.8)
RBC # BLD: 4.81 M/UL
RBC # FLD: 15 % — HIGH (ref 10.3–14.5)
RBC # FLD: 15.2 %
RSV RNA NPH QL NAA+NON-PROBE: SIGNIFICANT CHANGE UP
SAO2 % BLDV: 63.3 % — LOW (ref 67–88)
SARS-COV-2 RNA SPEC QL NAA+PROBE: SIGNIFICANT CHANGE UP
SODIUM SERPL-SCNC: 138 MMOL/L — SIGNIFICANT CHANGE UP (ref 135–145)
SODIUM SERPL-SCNC: 142 MMOL/L
SP GR SPEC: 1.04 — HIGH (ref 1.01–1.02)
T3 SERPL-MCNC: 108 NG/DL
T3RU NFR SERPL: 1 TBI
T4 FREE SERPL-MCNC: 1.3 NG/DL
T4 SERPL-MCNC: 8 UG/DL
TRIGL SERPL-MCNC: 47 MG/DL
TROPONIN T, HIGH SENSITIVITY RESULT: 38 NG/L — SIGNIFICANT CHANGE UP (ref 0–51)
TROPONIN T, HIGH SENSITIVITY RESULT: 39 NG/L — SIGNIFICANT CHANGE UP (ref 0–51)
TSH SERPL-ACNC: 1.67 UIU/ML
TSH SERPL-MCNC: 2.98 UIU/ML — SIGNIFICANT CHANGE UP (ref 0.27–4.2)
UROBILINOGEN FLD QL: NEGATIVE — SIGNIFICANT CHANGE UP
WBC # BLD: 7.92 K/UL — SIGNIFICANT CHANGE UP (ref 3.8–10.5)
WBC # FLD AUTO: 7.53 K/UL
WBC # FLD AUTO: 7.92 K/UL — SIGNIFICANT CHANGE UP (ref 3.8–10.5)

## 2023-03-08 PROCEDURE — 82803 BLOOD GASES ANY COMBINATION: CPT

## 2023-03-08 PROCEDURE — 70450 CT HEAD/BRAIN W/O DYE: CPT | Mod: MA

## 2023-03-08 PROCEDURE — 93306 TTE W/DOPPLER COMPLETE: CPT

## 2023-03-08 PROCEDURE — 83735 ASSAY OF MAGNESIUM: CPT

## 2023-03-08 PROCEDURE — 87086 URINE CULTURE/COLONY COUNT: CPT

## 2023-03-08 PROCEDURE — 82962 GLUCOSE BLOOD TEST: CPT

## 2023-03-08 PROCEDURE — 99236 HOSP IP/OBS SAME DATE HI 85: CPT | Mod: FS

## 2023-03-08 PROCEDURE — 93306 TTE W/DOPPLER COMPLETE: CPT | Mod: 26

## 2023-03-08 PROCEDURE — 71045 X-RAY EXAM CHEST 1 VIEW: CPT | Mod: 26

## 2023-03-08 PROCEDURE — 82947 ASSAY GLUCOSE BLOOD QUANT: CPT

## 2023-03-08 PROCEDURE — 71045 X-RAY EXAM CHEST 1 VIEW: CPT

## 2023-03-08 PROCEDURE — 84295 ASSAY OF SERUM SODIUM: CPT

## 2023-03-08 PROCEDURE — 70498 CT ANGIOGRAPHY NECK: CPT | Mod: MA

## 2023-03-08 PROCEDURE — 83605 ASSAY OF LACTIC ACID: CPT

## 2023-03-08 PROCEDURE — 99285 EMERGENCY DEPT VISIT HI MDM: CPT

## 2023-03-08 PROCEDURE — 70496 CT ANGIOGRAPHY HEAD: CPT | Mod: MA

## 2023-03-08 PROCEDURE — 99285 EMERGENCY DEPT VISIT HI MDM: CPT | Mod: 25

## 2023-03-08 PROCEDURE — 70498 CT ANGIOGRAPHY NECK: CPT | Mod: 26,MA

## 2023-03-08 PROCEDURE — 36415 COLL VENOUS BLD VENIPUNCTURE: CPT

## 2023-03-08 PROCEDURE — 80053 COMPREHEN METABOLIC PANEL: CPT

## 2023-03-08 PROCEDURE — 85025 COMPLETE CBC W/AUTO DIFF WBC: CPT

## 2023-03-08 PROCEDURE — 84100 ASSAY OF PHOSPHORUS: CPT

## 2023-03-08 PROCEDURE — 81003 URINALYSIS AUTO W/O SCOPE: CPT

## 2023-03-08 PROCEDURE — 96374 THER/PROPH/DIAG INJ IV PUSH: CPT | Mod: XU

## 2023-03-08 PROCEDURE — 84132 ASSAY OF SERUM POTASSIUM: CPT

## 2023-03-08 PROCEDURE — 85018 HEMOGLOBIN: CPT

## 2023-03-08 PROCEDURE — 84443 ASSAY THYROID STIM HORMONE: CPT

## 2023-03-08 PROCEDURE — 85014 HEMATOCRIT: CPT

## 2023-03-08 PROCEDURE — 82435 ASSAY OF BLOOD CHLORIDE: CPT

## 2023-03-08 PROCEDURE — 84484 ASSAY OF TROPONIN QUANT: CPT

## 2023-03-08 PROCEDURE — 70496 CT ANGIOGRAPHY HEAD: CPT | Mod: 26,MA

## 2023-03-08 PROCEDURE — 82330 ASSAY OF CALCIUM: CPT

## 2023-03-08 PROCEDURE — 87476 LYME DIS DNA AMP PROBE: CPT

## 2023-03-08 PROCEDURE — 93005 ELECTROCARDIOGRAM TRACING: CPT

## 2023-03-08 PROCEDURE — 87637 SARSCOV2&INF A&B&RSV AMP PRB: CPT

## 2023-03-08 PROCEDURE — G0378: CPT

## 2023-03-08 RX ORDER — LISINOPRIL 2.5 MG/1
40 TABLET ORAL DAILY
Refills: 0 | Status: DISCONTINUED | OUTPATIENT
Start: 2023-03-08 | End: 2023-03-11

## 2023-03-08 RX ORDER — MECLIZINE HCL 12.5 MG
25 TABLET ORAL ONCE
Refills: 0 | Status: DISCONTINUED | OUTPATIENT
Start: 2023-03-08 | End: 2023-03-08

## 2023-03-08 RX ORDER — ASPIRIN/CALCIUM CARB/MAGNESIUM 324 MG
81 TABLET ORAL DAILY
Refills: 0 | Status: DISCONTINUED | OUTPATIENT
Start: 2023-03-08 | End: 2023-03-11

## 2023-03-08 RX ORDER — PANTOPRAZOLE SODIUM 20 MG/1
40 TABLET, DELAYED RELEASE ORAL DAILY
Refills: 0 | Status: DISCONTINUED | OUTPATIENT
Start: 2023-03-08 | End: 2023-03-11

## 2023-03-08 RX ORDER — SODIUM,POTASSIUM PHOSPHATES 278-250MG
1 POWDER IN PACKET (EA) ORAL ONCE
Refills: 0 | Status: COMPLETED | OUTPATIENT
Start: 2023-03-08 | End: 2023-03-08

## 2023-03-08 RX ORDER — MAGNESIUM SULFATE 500 MG/ML
1 VIAL (ML) INJECTION ONCE
Refills: 0 | Status: COMPLETED | OUTPATIENT
Start: 2023-03-08 | End: 2023-03-08

## 2023-03-08 RX ORDER — AMLODIPINE BESYLATE 2.5 MG/1
10 TABLET ORAL DAILY
Refills: 0 | Status: DISCONTINUED | OUTPATIENT
Start: 2023-03-08 | End: 2023-03-11

## 2023-03-08 RX ORDER — SIMVASTATIN 20 MG/1
20 TABLET, FILM COATED ORAL AT BEDTIME
Refills: 0 | Status: DISCONTINUED | OUTPATIENT
Start: 2023-03-08 | End: 2023-03-11

## 2023-03-08 RX ORDER — MECLIZINE HCL 12.5 MG
12.5 TABLET ORAL ONCE
Refills: 0 | Status: COMPLETED | OUTPATIENT
Start: 2023-03-08 | End: 2023-03-08

## 2023-03-08 RX ADMIN — LISINOPRIL 40 MILLIGRAM(S): 2.5 TABLET ORAL at 15:50

## 2023-03-08 RX ADMIN — Medication 1 PACKET(S): at 15:36

## 2023-03-08 RX ADMIN — Medication 12.5 MILLIGRAM(S): at 06:04

## 2023-03-08 RX ADMIN — Medication 100 GRAM(S): at 15:36

## 2023-03-08 RX ADMIN — Medication 81 MILLIGRAM(S): at 15:36

## 2023-03-08 RX ADMIN — AMLODIPINE BESYLATE 10 MILLIGRAM(S): 2.5 TABLET ORAL at 15:36

## 2023-03-08 RX ADMIN — PANTOPRAZOLE SODIUM 40 MILLIGRAM(S): 20 TABLET, DELAYED RELEASE ORAL at 15:36

## 2023-03-08 NOTE — ED PROVIDER NOTE - NSICDXPASTMEDICALHX_GEN_ALL_CORE_FT
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

## 2023-03-08 NOTE — ED CDU PROVIDER DISPOSITION NOTE - NSFOLLOWUPINSTRUCTIONS_ED_ALL_ED_FT
1. Stay hydrated.  2. Continue Current Home Medications.   3. Follow up with your PCP in 1-2 days. Follow up with your Cardiologist Dr. Vasquez within 2 days. Recommend outpatient holter monitor or zio patch. also follow up "Posterior mitral leaflet prolapse. Mild mitral regurgitation directed anteromedially." on Echocardiogram. (Bring printed results to your doctor visit).  review ekg with your cardiologist  4. Return if symptoms, worsen, fever, weakness, chest pain, difficulty breathing, dizziness and all other concerns.    please follow up low heart rate with your doctors  your magnesium was borderline here and your phosphorus was low here, we replete this here but would recommend increasing in your diet.   you had a small amount of ketones in your urine here which could indicate that you were dehydrated, please increase oral fluid hydration    please follow up the following abnormalities on imaging:  on CTA head and neck: aneurysmal dilatation of the visualized ascending aorta to at least   4.1 cm.  Approximately 1 cm left thyroid nodule.  "Atherosclerotic calcification causes mild stenosis in the proximal supraclinoid segment of right internal carotid artery and minimal stenosis in the proximal supraclinoid segment left internal carotid   artery. 3. There is a tiny 1 to 2 mm outpouching arising from the supraclinoid segment of the right internal carotid artery; this likely represents an infundibular origin of a tiny nonvisualized right posterior communicating artery, however an aneurysm is not excluded.  Serial imaging may be obtained over time to exclude lesion growth."    Based on the results discussed, we recommend follow up with Neurosurgery within 1week and Vascular Surgery within 1 week.    Deedee Smith)  Neurosurgery  [address] [facility], [address]  [place] [address]  Phone: [phone]  Fax: [phone]    Osvaldo Sanford)  Vascular Surgery  [address] [facility], [address] [place] [id number]  Phone: [phone]  Fax: [phone]          Dizziness    WHAT YOU NEED TO KNOW:    Dizziness is a feeling of being off balance or unsteady. Common causes of dizziness are an inner ear fluid imbalance or a lack of oxygen in your blood. Dizziness may be acute (lasts 3 days or less) or chronic (lasts longer than 3 days). You may have dizzy spells that last from seconds to a few hours.     DISCHARGE INSTRUCTIONS:    Return to the emergency department if:   •You have a headache and a stiff neck.      •You have shaking chills and a fever.       •You vomit over and over with no relief.       •Your vomit or bowel movements are red or black.       •You have pain in your chest, back, or abdomen.       •You have numbness, especially in your face, arms, or legs.       •You have trouble moving your arms or legs.       •You are confused.       Contact your healthcare provider if:   •You have a fever.       •Your symptoms do not get better with treatment.       •You have questions or concerns about your condition or care.       Manage your symptoms:   •Do not drive or operate heavy machinery when you are dizzy.       •Get up slowly from sitting or lying down.       •Drink plenty of liquids. Liquids help prevent dehydration. Ask how much liquid to drink each day and which liquids are best for you.      Follow up with your doctor as directed: Write down your questions so you remember to ask them during your visits.       © Copyright [employer] 2023           back to top                          © Copyright [employer] 2023

## 2023-03-08 NOTE — ED ADULT NURSE NOTE - ED STAT RN HANDOFF DETAILS 2
Patient  is transferred to CDU,report is given. Condition is stable.  No  c/o  dizziness or  chest pain offered.

## 2023-03-08 NOTE — ED ADULT NURSE NOTE - OBJECTIVE STATEMENT
83 y.o M PMH presenting to the ED for lightheadedness/dizziness. AOx4, MAEx4, respirations even and unlabored, abd soft nontender/nondistended, skin warm dry and normal for race. Pt denies fever/chills, H/A, vision changes, SOB, chest pain, abd pain, N/V/D, constipation, dysuria, hematuria, hematochezia, weakness, numbness, and tingling. 83 y.o M PMH presenting to the ED for lightheadedness/dizziness. Pt states that he woke up to use the bathroom and after walking to the bathroom, he began to feel dizzy and generally weak. He attempted to sit down to relieve symptoms with no improvement.  AOx4, MAEx4, respirations even and unlabored, abd soft nontender/nondistended, skin warm dry and normal for race. Pt denies fever/chills, H/A, vision changes, SOB, chest pain, abd pain, N/V/D, constipation, dysuria, hematuria, hematochezia, weakness, numbness, and tingling.

## 2023-03-08 NOTE — ED CDU PROVIDER INITIAL DAY NOTE - CLINICAL SUMMARY MEDICAL DECISION MAKING FREE TEXT BOX
Patient placed in obs for echo due to episodes of dizziness with few episodes of bradycardia. HR wnl at this time.

## 2023-03-08 NOTE — ED ADULT NURSE REASSESSMENT NOTE - GENERAL PATIENT STATE
comfortable appearance/cooperative/improvement verbalized/family/SO at bedside/resting/sleeping
comfortable appearance/cooperative/improvement verbalized/family/SO at bedside/resting/sleeping

## 2023-03-08 NOTE — ED CDU PROVIDER INITIAL DAY NOTE - PROGRESS NOTE DETAILS
CDU NOTE MARIZA Glover: per house cards- recommend echo here today, if unable to get then can be done tomorrow by his cardiologist. spoke to Cardiologist Dr. Lisker covering for Dr. Vasquez, ok with plan/cdu, can do echo outpatient if unable to get in hospital today. CDU NOTE MARIZA Glover: pt resting comfortably, feels well without complaint. wants to go home. NAD VSS. no events on tele. echo- reviewed by house cards attending ok to d/c home.  as per Dr. Rich Yang pt stable for d/c home to f/up outpatient

## 2023-03-08 NOTE — ED PROVIDER NOTE - OBJECTIVE STATEMENT
83-year-old active male with a history of hypertension followed by cardiology Dr. Milan Vasquez presents with sudden onset of lightheadedness and diaphoresis after waking up to use the restroom this morning at approximately 4 AM.  Mild associated shortness of breath without chest pain numbness tingling weakness headache or vision changes.  No recent URI symptoms vomiting diarrhea or urinary symptoms.  Per EMS vital signs were within normal limits in route however the patient was unable to stand without assistance.

## 2023-03-08 NOTE — ED PROVIDER NOTE - CLINICAL SUMMARY MEDICAL DECISION MAKING FREE TEXT BOX
elderly male with hx hypertension presents with sudden onset of room spinning/lightheaded sensation associated with diaphoresis mild shortness of breath with leftward beating nystagmus. No dysmetria or obvious focal neurologic deficits on initial exam.  EKG with intermittent PVCs and first-degree block otherwise without ST changes or malignant interval changes.  Differential includes peripheral vertigo versus ACS versus posterior stroke.  Will treat symptomatically, CTA head and neck. no need for code stroke protocol as patient is awake alert with no motor deficits and intact vision, be fast negative.  Will check urine in the setting of advanced age despite absence of infectious symptoms.  Remainder of exam benign without any differential breath sounds abdominal tenderness or localizing complaints.  Admission for cardiac work-up if results are unremarkable in the ER.

## 2023-03-08 NOTE — ED PROVIDER NOTE - NS ED ROS FT
Constitutional: no fevers, chills  HEENT: no HA, vision changes, rhinorrhea, sore throat  Cardiac: no chest pain, palpitations  Respiratory: no SOB, cough or hemoptysis  GI: no n/v/d/c, abd pain, bloody or dark stools  : no dysuria, frequency, or hematuria  MSK: no joint pain, neck pain or back pain  Skin: no rashes, jaundice, pruritis  Neuro: no numbness/tingling, +gen weakness, +unsteady gait  Psych: stress; wife at home developing cognitive difficulties.   ROS otherwise negative except per HPI

## 2023-03-08 NOTE — ED ADULT NURSE REASSESSMENT NOTE - NS ED NURSE REASSESS COMMENT FT1
pt amb Denies any discomfort Denies dizziness GCS 15 Amb with steady gait Accomp by wife. Denies chest pain or sob.

## 2023-03-08 NOTE — ED PROVIDER NOTE - PHYSICAL EXAMINATION
General: non-toxic, NAD  HEENT: NCAT, PERRL, oropharyngeal AW patent, no conjunctival pallor   Cardiac: irregular rhythm no murmurs, 2+ peripheral pulses  Resp: CTAB, normal rate  Abdomen: soft, non-distended, bowel sounds present, no ttp, no rebound or guarding. no organomegaly  Extremities: no peripheral edema, calf tenderness, or leg size discrepancies  Skin: warm and dry no rashes, scattered cherry hemangiomas  Neuro: AAOx4, 5+motor, sensation grossly intact CN 2-12 intact. nystagmus on left lateral gaze. no dysmetria to finger/nose/finger or heel/shin testing. no dysdiadochokinesia.   Psych: mood and affect appropriate

## 2023-03-08 NOTE — ED CDU PROVIDER DISPOSITION NOTE - PATIENT PORTAL LINK FT
You can access the FollowMyHealth Patient Portal offered by [facility] by registering at the following website: [email or web address]. By joining [facility]’s FollowMyHealth portal, you will also be able to view your health information using other applications (apps) compatible with our system.

## 2023-03-08 NOTE — ED PROVIDER NOTE - NSICDXPASTSURGICALHX_GEN_ALL_CORE_FT
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

## 2023-03-08 NOTE — ED ADULT NURSE NOTE - PRIMARY CARE PROVIDER
Problem: VTE, Risk for  Goal: # No s/s of VTE  Outcome: Outcome Met, Continue evaluating goal progress toward completion  Patient wears KATHLEEN stockings. SCD when sleeping at night. Legs free of redness and pain. Will continue to follow closely.         unk

## 2023-03-08 NOTE — ED PROVIDER NOTE - ATTENDING CONTRIBUTION TO CARE
MD Mcallister:  patient seen and evaluated personally.   I agree with the History & Physical,  Impression & Plan other than what was detailed in my note.  MD Mcallister  84 y/o m no sig pmh, presents to ed w/ cc of dizziness, initially pt states this was lightheadedness and feels like he was going to pass out, started abruptly associated w/ diaphoresis, sob, pt states after that he went to sit on cough and felt things spinning, has not had this before, still feels lightheaded, no vomiting, no chest pain, no palpitations, pt came in because just not feeling right, afebrile vitals stable  non toxic appears uncomfortable,, NC/AT,  conjunctiva non conjected, sclera anicteric, moist mucous membranes, neck supple, heart sounds, normal, no mrg, lungs cta b/l no wrr, abd soft non distended w/ no tenderness, no visual deformities of extremities, axox3, power 5/5x 4, cn 2-xii gi, nystagmus upon gaze to left that is fatiguable, triggers symptoms,  finger to nose normal,  rapid alternating movements in tact, normal mood and affect, plan for cardiac workup, concerning hx and looks unwell, placed on pads, ekg shows sinus arrythmia, w/ 1st degree block no evidence of bradycardia or higer grade block, will also get cta of head as pt having vertigo, likely will need admission. not examined

## 2023-03-08 NOTE — ED PROVIDER NOTE - NSICDXFAMILYHX_GEN_ALL_CORE_FT
FAMILY HISTORY:  Father  Still living? No  Family history of colon cancer, Age at diagnosis: Age Unknown    Mother  Still living? No  Family history of lung cancer, Age at diagnosis: Age Unknown    Sibling  Still living? Yes, Estimated age: 51-60  Family history of breast cancer, Age at diagnosis: Age Unknown    Child  Still living? Yes, Estimated age: 41-50  Family history of testicular cancer, Age at diagnosis: Age Unknown

## 2023-03-08 NOTE — ED CDU PROVIDER DISPOSITION NOTE - ATTENDING APP SHARED VISIT CONTRIBUTION OF CARE
Echo only shows mitral valve findings.  Otherwise normal.  No events while being monitored here.  Patient is anxious to depart.  Will discharge with outpatient follow-up.

## 2023-03-08 NOTE — ED CDU PROVIDER INITIAL DAY NOTE - OBJECTIVE STATEMENT
83-year-old active male with a history of hypertension followed by cardiology Dr. Milan Vasquez presents with sudden onset of lightheadedness after waking up to use the restroom this morning at approximately 4 AM.  pt states felt dizzy, had to sit down, sensation of dizziness improved after medications here in hospital. pt reports that he is a very active person, exercises at gym 2hrs every day, yesterday exercised and went on walk and felt fine. this was unusual for him. pt does admit he drinks probably about 3-4 glasses of water daily.   pt denies chest pain numbness tingling weakness headache or vision changes.  No recent URI symptoms vomiting diarrhea or urinary symptoms.

## 2023-03-08 NOTE — ED PROVIDER NOTE - PROGRESS NOTE DETAILS
Attending Ary:  rhythm strip printed out as pt roberto carlos to 56, difficult to discern rhythm, seems consistent w/ pvc's, possible type 1, scrutinized ekg to ensure no third degree I do not see any evidence of this Attending Ary:  rhythm strip shows now hr 37 no clear p waves however is on rhythm strip, bp remains stable currently 140, discussed w/ cardiology briefly, they are caring for a stemi currently but will come to evaluate pt as soon as able, hr currently in 60's and pt is feeling well. Attending Masom:  updated pt and fam member about current plan Grace Winchester M.D. [employer] Fellow   Hospitalist called for admission, would prefer cardiology input prior to admission. Spoke to AM virginia, fellow, who states pt has not been seen because they didn't receive full consult earlier. full consult initiated and will see pt soon. Grace Winchester M.D. [employer] Fellow  cards will evaluate pt shortly. Pt ambulated independently with steady gait to the bathroom and back. Agreeable to CDU stay. Grace Winchester M.D. [employer]ox Fellow   Hospitalist called for admission, would prefer cardiology input prior to admission. Spoke to AM cards,

## 2023-03-08 NOTE — ED CDU PROVIDER DISPOSITION NOTE - CLINICAL COURSE
· HPI Objective Statement: 83-year-old active male with a history of hypertension followed by cardiology Dr. Milan Vasquez presents with sudden onset of lightheadedness and diaphoresis after waking up to use the restroom this morning at approximately 4 AM.  Mild associated shortness of breath without chest pain numbness tingling weakness headache or vision changes.  No recent URI symptoms vomiting diarrhea or urinary symptoms.  Per EMS vital signs were within normal limits in route however the patient was unable to stand without assistance.  in ED labs nonactionable. low phosphorus. ketones in urine, increased oral hydration in hospital, feeling better. phosphorus and magnesium repletion in cdu. oral hydration. seen by cards- echo done, no acute/emergent findings, pt to follow up outpatient

## 2023-03-08 NOTE — ED ADULT NURSE REASSESSMENT NOTE - COMFORT CARE
ambulated to bathroom/darkened lights/meal provided/plan of care explained/side rails up/treatment delay explained/wait time explained/warm blanket provided
darkened lights/plan of care explained/side rails up/treatment delay explained/wait time explained/warm blanket provided

## 2023-03-08 NOTE — ED ADULT NURSE REASSESSMENT NOTE - NS ED NURSE REASSESS COMMENT FT1
Received pt awake alert and oriented x4 Resp even and nonlab Denies dizziness CM Sinus bradycardia HR 57 Spouse at bedside.

## 2023-03-08 NOTE — CONSULT NOTE ADULT - SUBJECTIVE AND OBJECTIVE BOX
CC: dizziness    HPI: 83M w HTN here w lightheadedness and diaphoresis. Onset 1 day prior while waking up to use bathroom. + SOB. Denies CP, syncope, visual changes. No recent changes or illness.     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 (21 Sep 2018 12:18)  amLODIPine 5 mg oral tablet: 1 tab(s) orally once a day (20 Sep 2018 09:52)  Metamucil: orally once a day (20 Sep 2018 09:52)  ramipril 5 mg oral capsule: 1 cap(s) orally once a day (20 Sep 2018 09:52)  simvastatin 20 mg oral tablet: 1 tab(s) orally once a day (at bedtime) (20 Sep 2018 09:52)    REVIEW OF SYSTEMS:  CONSTITUTIONAL: No fever, weight loss, or fatigue  EYES: No eye pain, visual disturbances, or discharge  ENMT:  No difficulty hearing, tinnitus, vertigo; No sinus or throat pain  NECK: No pain or stiffness  RESPIRATORY: No cough, wheezing, chills or hemoptysis; No Shortness of Breath  CARDIOVASCULAR: No chest pain, palpitations, passing out, dizziness, or leg swelling  GASTROINTESTINAL: No abdominal or epigastric pain. No nausea, vomiting, or hematemesis; No diarrhea or constipation. No melena or hematochezia.  GENITOURINARY: No dysuria, frequency, hematuria, or incontinence  NEUROLOGICAL: No headaches, memory loss, loss of strength, numbness, or tremors  SKIN: No itching, burning, rashes, or lesions   LYMPH Nodes: No enlarged glands  ENDOCRINE: No heat or cold intolerance; No hair loss  MUSCULOSKELETAL: No joint pain or swelling; No muscle, back, or extremity pain  PSYCHIATRIC: No depression, anxiety, mood swings, or difficulty sleeping  HEME/LYMPH: No easy bruising, or bleeding gums  ALLERY AND IMMUNOLOGIC: No hives or eczema	    [x] All others negative	    PHYSICAL EXAM:  Vital Signs Last 24 Hrs  T(C): 36.2 (08 Mar 2023 07:20), Max: 36.7 (08 Mar 2023 05:11)  T(F): 97.2 (08 Mar 2023 07:20), Max: 98 (08 Mar 2023 05:11)  HR: 77 (08 Mar 2023 10:17) (60 - 77)  BP: 110/77 (08 Mar 2023 10:17) (110/77 - 150/88)  BP(mean): --  RR: 18 (08 Mar 2023 10:17) (18 - 19)  SpO2: 99% (08 Mar 2023 10:17) (97% - 99%)    Parameters below as of 08 Mar 2023 10:17  Patient On (Oxygen Delivery Method): room air        Daily Height in cm: 175.26 (08 Mar 2023 05:11)    Daily     Appearance: Normal	  HEENT:   Normal oral mucosa, PERRL, EOMI	  Lymphatic: No lymphadenopathy  Cardiovascular: Normal S1 S2, No JVD, II/VI FLORES, No edema  Respiratory: Lungs clear to auscultation	  Psychiatry: A & O x 3, Mood & affect appropriate  Gastrointestinal:  Soft, Non-tender, + BS	  Skin: No rashes, No ecchymoses, No cyanosis	  Neurologic: Non-focal  Extremities: Normal range of motion, No clubbing, cyanosis or edema  Vascular: Peripheral pulses palpable 2+ bilaterally    LABS:	 	  I&O's Summary                                13.2   7.92  )-----------( 152      ( 08 Mar 2023 06:29 )             40.4     03-08    138  |  103  |  22  ----------------------------<  121<H>  3.7   |  21<L>  |  0.93    Ca    9.6      08 Mar 2023 06:30  Phos  1.7     03-08  Mg     1.7     03-08    TPro  6.6  /  Alb  4.2  /  TBili  0.7  /  DBili  x   /  AST  34  /  ALT  19  /  AlkPhos  55  03-08      Creatinine Trend: 0.93<--    ECAVB, PVCs, LAD  	  ASSESSMENT/PLAN: 83M w HTN here w lightheadedness and diaphoresis. ECG w PVCs and 1AVB  -tele  -TTE  -cont amlodipine  -outpt ziopadilcia      ***    Tonny Ruano MD, MPhil, [facility]  Cardiologist, [facility]  ; [employer] at [facility]  email: [email or web address] on-service contact/call information, go to [email or web address] and use [email or web address] to login.  Outpatient Cardiology appointments, call  [phone] to arrange with a colleague; I do not have outpatient Cardiology clinic.  CC: dizziness    HPI: 83M w HTN here w lightheadedness and diaphoresis. Onset 1 day prior while waking up to use bathroom. + SOB. Denies CP, syncope, visual changes. No recent changes or illness.     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 (21 Sep 2018 12:18)  amLODIPine 5 mg oral tablet: 1 tab(s) orally once a day (20 Sep 2018 09:52)  Metamucil: orally once a day (20 Sep 2018 09:52)  ramipril 5 mg oral capsule: 1 cap(s) orally once a day (20 Sep 2018 09:52)  simvastatin 20 mg oral tablet: 1 tab(s) orally once a day (at bedtime) (20 Sep 2018 09:52)    REVIEW OF SYSTEMS:  CONSTITUTIONAL: No fever, weight loss, or fatigue  EYES: No eye pain, visual disturbances, or discharge  ENMT:  No difficulty hearing, tinnitus, vertigo; No sinus or throat pain  NECK: No pain or stiffness  RESPIRATORY: No cough, wheezing, chills or hemoptysis; No Shortness of Breath  CARDIOVASCULAR: No chest pain, palpitations, passing out, dizziness, or leg swelling  GASTROINTESTINAL: No abdominal or epigastric pain. No nausea, vomiting, or hematemesis; No diarrhea or constipation. No melena or hematochezia.  GENITOURINARY: No dysuria, frequency, hematuria, or incontinence  NEUROLOGICAL: No headaches, memory loss, loss of strength, numbness, or tremors  SKIN: No itching, burning, rashes, or lesions   LYMPH Nodes: No enlarged glands  ENDOCRINE: No heat or cold intolerance; No hair loss  MUSCULOSKELETAL: No joint pain or swelling; No muscle, back, or extremity pain  PSYCHIATRIC: No depression, anxiety, mood swings, or difficulty sleeping  HEME/LYMPH: No easy bruising, or bleeding gums  ALLERY AND IMMUNOLOGIC: No hives or eczema	    [x] All others negative	    PHYSICAL EXAM:  Vital Signs Last 24 Hrs  T(C): 36.2 (08 Mar 2023 07:20), Max: 36.7 (08 Mar 2023 05:11)  T(F): 97.2 (08 Mar 2023 07:20), Max: 98 (08 Mar 2023 05:11)  HR: 77 (08 Mar 2023 10:17) (60 - 77)  BP: 110/77 (08 Mar 2023 10:17) (110/77 - 150/88)  BP(mean): --  RR: 18 (08 Mar 2023 10:17) (18 - 19)  SpO2: 99% (08 Mar 2023 10:17) (97% - 99%)    Parameters below as of 08 Mar 2023 10:17  Patient On (Oxygen Delivery Method): room air        Daily Height in cm: 175.26 (08 Mar 2023 05:11)    Daily     Appearance: Normal	  HEENT:   Normal oral mucosa, PERRL, EOMI	  Lymphatic: No lymphadenopathy  Cardiovascular: Normal S1 S2, No JVD, II/VI FLORES, No edema  Respiratory: Lungs clear to auscultation	  Psychiatry: A & O x 3, Mood & affect appropriate  Gastrointestinal:  Soft, Non-tender, + BS	  Skin: No rashes, No ecchymoses, No cyanosis	  Neurologic: Non-focal  Extremities: Normal range of motion, No clubbing, cyanosis or edema  Vascular: Peripheral pulses palpable 2+ bilaterally    LABS:	 	  I&O's Summary                                13.2   7.92  )-----------( 152      ( 08 Mar 2023 06:29 )             40.4     03-08    138  |  103  |  22  ----------------------------<  121<H>  3.7   |  21<L>  |  0.93    Ca    9.6      08 Mar 2023 06:30  Phos  1.7     03-08  Mg     1.7     03-08    TPro  6.6  /  Alb  4.2  /  TBili  0.7  /  DBili  x   /  AST  34  /  ALT  19  /  AlkPhos  55  03-08      Creatinine Trend: 0.93<--    ECAVB, PVCs, LAD  	  ASSESSMENT/PLAN: 83M w HTN here w lightheadedness and diaphoresis. ECG w PVCs and 1AVB. Possible micturation syncope vs arrhythmia  -tele  -TTE  -cont amlodipine  -holding on AVN blockers for bradycardia and 1AVB  -outpt ziopatch      ***    Tonny Ruano MD, MPhil, [facility]  Cardiologist, [facility]  ; [employer] at [facility]  email: [email or web address] [email or web address] Cardiovascular Surgery on-service contact/call information, go to [email or web address] and use "[email or web address]" to login.  Outpatient Cardiology appointments, call  [phone] to arrange with a colleague; I do not have outpatient Cardiology clinic.

## 2023-03-09 LAB
CULTURE RESULTS: NO GROWTH — SIGNIFICANT CHANGE UP
SPECIMEN SOURCE: SIGNIFICANT CHANGE UP

## 2023-03-10 ENCOUNTER — APPOINTMENT (OUTPATIENT)
Dept: CARDIOLOGY | Facility: CLINIC | Age: 83
End: 2023-03-10
Payer: MEDICARE

## 2023-03-10 VITALS
RESPIRATION RATE: 17 BRPM | SYSTOLIC BLOOD PRESSURE: 138 MMHG | DIASTOLIC BLOOD PRESSURE: 80 MMHG | BODY MASS INDEX: 23.7 KG/M2 | WEIGHT: 160 LBS | HEART RATE: 60 BPM | HEIGHT: 69 IN | OXYGEN SATURATION: 97 %

## 2023-03-10 DIAGNOSIS — Z92.89 PERSONAL HISTORY OF OTHER MEDICAL TREATMENT: ICD-10-CM

## 2023-03-10 PROCEDURE — 99214 OFFICE O/P EST MOD 30 MIN: CPT

## 2023-03-10 NOTE — HISTORY OF PRESENT ILLNESS
[FreeTextEntry1] : 11/8/2021 - Office visit:\par He denies chest pain, shortness of breath, and palpitations.\par \par 02/08/2022 - Office visit:\par He denies chest pain, shortness of breath, palpitations, and dizziness.\par He is expecting his second great grandchild.\par \par 05/23/2022 - Office visit:\par He denies chest pain, shortness of breath, palpitations, and dizziness.\par \par 08/29/2022 - Office visit:\par He will be seeing Dr. Guevara for pain radiating down his lateral right lower extremity after sitting and for numbness/tingling in his right upper extremity after he wakes up.\par He plays golf 2-3 times per week and treads water in the pool.\par At home, his blood pressures are 117-125/70-75.\par He denies chest pain, shortness of breath, and palpitations.\par \par 12/05/2022 - Office visit:\par Blood pressures at home are 117-128/68-80.\par He denies chest pain, shortness of breath, palpitations, and dizziness.\par Significant stress.\par \par 03/06/2023 - Office visit:\par His blood pressures at home are 122-128/74-80; he checks 2-3 times per week.  His blood pressure yesterday was 124/74.  Heart rates are 56-62.\par He denies chest pain, shortness of breath, palpitations, and dizziness.\par \par 3/10/2023- Office visit:\par Bryan Ballard returns today for follow up after a recent emergency room visit. He began to feel dizzy and felt his knees buckle after going to the bathroom. He did not lose consciousness and was able to brace himself from a fall.  His wife called EMS and he was evaluated at [facility]. During his stay a CT angiography of the neck revealed a tiny 1-2 mm outpouching of the right internal carotid artery and he has been referred to vascular surgery for further evaluation. He was diagnosed with micturition syncope and recommended back for outpatient follow up. During his stay his heart rate was noted to be on the lower side which he reports is his usual. Otherwise today he is feeling well and has no specific complaints aside from ongoing worries about his wife's health. \par \par \par

## 2023-03-13 LAB — B BURGDOR DNA SPEC QL NAA+PROBE: NEGATIVE — SIGNIFICANT CHANGE UP

## 2023-03-15 ENCOUNTER — APPOINTMENT (OUTPATIENT)
Age: 83
End: 2023-03-15
Payer: MEDICARE

## 2023-03-15 ENCOUNTER — NON-APPOINTMENT (OUTPATIENT)
Age: 83
End: 2023-03-15

## 2023-03-15 VITALS
HEART RATE: 75 BPM | HEIGHT: 69 IN | OXYGEN SATURATION: 95 % | BODY MASS INDEX: 23.7 KG/M2 | SYSTOLIC BLOOD PRESSURE: 130 MMHG | TEMPERATURE: 97.9 F | WEIGHT: 160 LBS | DIASTOLIC BLOOD PRESSURE: 61 MMHG

## 2023-03-15 PROCEDURE — 99203 OFFICE O/P NEW LOW 30 MIN: CPT

## 2023-03-15 NOTE — PHYSICAL EXAM
[Person] : oriented to person [Place] : oriented to place [Time] : oriented to time [Cranial Nerves Optic (II)] : visual acuity intact bilaterally,  pupils equal round and reactive to light [Cranial Nerves Oculomotor (III)] : extraocular motion intact [Cranial Nerves Trigeminal (V)] : facial sensation intact symmetrically [Cranial Nerves Facial (VII)] : face symmetrical [Cranial Nerves Vestibulocochlear (VIII)] : hearing was intact bilaterally [Cranial Nerves Accessory (XI - Cranial And Spinal)] : head turning and shoulder shrug symmetric [Cranial Nerves Glossopharyngeal (IX)] : tongue and palate midline [Cranial Nerves Hypoglossal (XII)] : there was no tongue deviation with protrusion [Motor Tone] : muscle tone was normal in all four extremities [Motor Strength] : muscle strength was normal in all four extremities [Abnormal Walk] : normal gait [Balance] : balance was intact

## 2023-03-17 NOTE — HISTORY OF PRESENT ILLNESS
[de-identified] : NAVJOT JOHN is a 83 year old male with PMH of HTN, HLD, GERD, intervertebral lumbar disc disorder L4-5, L5-S1, lumbar back pain, lumbar spinal stenosis. He presented to [facility] ED on 3/8/23 with sudden onset of lightheadedness, diaphoresis, mild SOB. ECG showed sinus arrhythmia, 1st degree block. Differential includes peripheral vertigo vs. ACS vs. posterior stroke. Further work-up with CTA neck revealed incidental tiny 1-2mm outpouching arising from the supraclinoid segment of the right internal carotid artery, likely representing an infundibulum of a tiny Right PCOMM artery, or aneurysm. \par \par Cardiologist: Dr. Milan Vasquez

## 2023-03-17 NOTE — ASSESSMENT
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 \par \par Counseled patient on the natural history of aneurysms, discussed risk of true aneurysm rupture with the patient. The risk of aneurysm rupture is related to the size and location. Due to the small size of his possible aneurysm, it has very low risk of rupturing. Whether this is a true aneurysm or not, management will remain the same.\par \par Given his advanced age and very small size of the possible aneurysm, recommend conservative management with serial scans. If growth seen on interval scans, discussed possible aneurysm treatment options including minimally invasive endovascular intervention with embolization, or surgically open clipping of aneurysm. Explained that the risks of undergoing treatment outweigh the benefits in this case. \par \par Educated the patient on signs and symptoms of true aneurysm rupture or subarachnoid hemorrhage. Should he have severe, sudden onset worst headache of his life, advised that he must seek medical attention immediately and go to the emergency room if this occurs. \par \par \par \par PLAN:\par Repeat MRA brain non con in 6 months - September 2023 \par Follow up after to discuss results\par Continue follow up with PCP/cardiologist for further evaluation of presenting symptoms

## 2023-03-28 ENCOUNTER — APPOINTMENT (OUTPATIENT)
Dept: ELECTROPHYSIOLOGY | Facility: CLINIC | Age: 83
End: 2023-03-28
Payer: MEDICARE

## 2023-03-28 ENCOUNTER — LABORATORY RESULT (OUTPATIENT)
Age: 83
End: 2023-03-28

## 2023-03-28 ENCOUNTER — NON-APPOINTMENT (OUTPATIENT)
Age: 83
End: 2023-03-28

## 2023-03-28 VITALS
SYSTOLIC BLOOD PRESSURE: 147 MMHG | HEIGHT: 69 IN | DIASTOLIC BLOOD PRESSURE: 90 MMHG | WEIGHT: 161 LBS | HEART RATE: 66 BPM | OXYGEN SATURATION: 96 % | BODY MASS INDEX: 23.85 KG/M2

## 2023-03-28 DIAGNOSIS — I47.2 VENTRICULAR TACHYCARDIA: ICD-10-CM

## 2023-03-28 PROCEDURE — 99204 OFFICE O/P NEW MOD 45 MIN: CPT

## 2023-03-28 PROCEDURE — 93000 ELECTROCARDIOGRAM COMPLETE: CPT

## 2023-03-29 LAB
DEPRECATED KAPPA LC FREE/LAMBDA SER: 1.38 RATIO
KAPPA LC CSF-MCNC: 1.68 MG/DL
KAPPA LC SERPL-MCNC: 2.31 MG/DL
M PROTEIN SPEC IFE-MCNC: NORMAL

## 2023-03-30 PROBLEM — I47.2 WIDE-COMPLEX TACHYCARDIA: Status: ACTIVE | Noted: 2018-09-16

## 2023-04-02 LAB — IGA 24H UR QL IFE: NORMAL

## 2023-04-25 ENCOUNTER — OUTPATIENT (OUTPATIENT)
Dept: OUTPATIENT SERVICES | Facility: HOSPITAL | Age: 83
LOS: 1 days | End: 2023-04-25
Payer: MEDICARE

## 2023-04-25 ENCOUNTER — APPOINTMENT (OUTPATIENT)
Dept: NUCLEAR MEDICINE | Facility: IMAGING CENTER | Age: 83
End: 2023-04-25
Payer: MEDICARE

## 2023-04-25 DIAGNOSIS — I49.3 VENTRICULAR PREMATURE DEPOLARIZATION: ICD-10-CM

## 2023-04-25 DIAGNOSIS — I44.1 ATRIOVENTRICULAR BLOCK, SECOND DEGREE: ICD-10-CM

## 2023-04-25 DIAGNOSIS — R55 SYNCOPE AND COLLAPSE: ICD-10-CM

## 2023-04-25 DIAGNOSIS — Z90.89 ACQUIRED ABSENCE OF OTHER ORGANS: Chronic | ICD-10-CM

## 2023-04-25 DIAGNOSIS — Z96.643 PRESENCE OF ARTIFICIAL HIP JOINT, BILATERAL: Chronic | ICD-10-CM

## 2023-04-25 DIAGNOSIS — Z98.41 CATARACT EXTRACTION STATUS, RIGHT EYE: Chronic | ICD-10-CM

## 2023-04-25 DIAGNOSIS — Z90.49 ACQUIRED ABSENCE OF OTHER SPECIFIED PARTS OF DIGESTIVE TRACT: Chronic | ICD-10-CM

## 2023-04-25 PROCEDURE — 78830 RP LOCLZJ TUM SPECT W/CT 1: CPT

## 2023-04-25 PROCEDURE — 78830 RP LOCLZJ TUM SPECT W/CT 1: CPT | Mod: 26

## 2023-04-25 PROCEDURE — A9538: CPT

## 2023-05-10 ENCOUNTER — APPOINTMENT (OUTPATIENT)
Dept: CARDIOLOGY | Facility: CLINIC | Age: 83
End: 2023-05-10
Payer: MEDICARE

## 2023-05-10 ENCOUNTER — NON-APPOINTMENT (OUTPATIENT)
Age: 83
End: 2023-05-10

## 2023-05-10 VITALS
OXYGEN SATURATION: 96 % | WEIGHT: 162 LBS | BODY MASS INDEX: 23.92 KG/M2 | HEART RATE: 63 BPM | DIASTOLIC BLOOD PRESSURE: 68 MMHG | SYSTOLIC BLOOD PRESSURE: 138 MMHG

## 2023-05-10 VITALS — SYSTOLIC BLOOD PRESSURE: 124 MMHG | DIASTOLIC BLOOD PRESSURE: 66 MMHG

## 2023-05-10 DIAGNOSIS — R00.1 BRADYCARDIA, UNSPECIFIED: ICD-10-CM

## 2023-05-10 PROCEDURE — 93040 RHYTHM ECG WITH REPORT: CPT | Mod: 59

## 2023-05-10 PROCEDURE — 99214 OFFICE O/P EST MOD 30 MIN: CPT

## 2023-05-10 PROCEDURE — 93000 ELECTROCARDIOGRAM COMPLETE: CPT

## 2023-05-10 NOTE — HISTORY OF PRESENT ILLNESS
[FreeTextEntry1] : 11/8/2021 - Office visit:\par He denies chest pain, shortness of breath, and palpitations.\par \par 02/08/2022 - Office visit:\par He denies chest pain, shortness of breath, palpitations, and dizziness.\par He is expecting his second great grandchild.\par \par 05/23/2022 - Office visit:\par He denies chest pain, shortness of breath, palpitations, and dizziness.\par \par 08/29/2022 - Office visit:\par He will be seeing Dr. Guevara for pain radiating down his lateral right lower extremity after sitting and for numbness/tingling in his right upper extremity after he wakes up.\par He plays golf 2-3 times per week and treads water in the pool.\par At home, his blood pressures are 117-125/70-75.\par He denies chest pain, shortness of breath, and palpitations.\par \par 12/05/2022 - Office visit:\par Blood pressures at home are 117-128/68-80.\par He denies chest pain, shortness of breath, palpitations, and dizziness.\par Significant stress.\par \par 03/06/2023 - Office visit:\par His blood pressures at home are 122-128/74-80; he checks 2-3 times per week.  His blood pressure yesterday was 124/74.  Heart rates are 56-62.\par He denies chest pain, shortness of breath, palpitations, and dizziness.\par \par 3/10/2023- Office visit with Emily Kinsey NP:\par Bryan Ballard returns today for follow up after a recent emergency room visit. He began to feel dizzy and felt his knees buckle after going to the bathroom. He did not lose consciousness and was able to brace himself from a fall.  His wife called EMS and he was evaluated at [facility]. During his stay a CT angiography of the neck revealed a tiny 1-2 mm outpouching of the right internal carotid artery and he has been referred to vascular surgery for further evaluation. He was diagnosed with micturition syncope and recommended back for outpatient follow up. During his stay his heart rate was noted to be on the lower side which he reports is his usual. Otherwise today he is feeling well and has no specific complaints aside from ongoing worries about his wife's health. \par \par 05/10/2023 - Office visit:\par Blood pressures at home are 120-128/68-77.\par No additional near syncopal episodes.  He denies dizziness, chest pain, shortness of breath, and palpitations.\par Amyloid work-up negative.\par \par \par

## 2023-05-17 ENCOUNTER — NON-APPOINTMENT (OUTPATIENT)
Age: 83
End: 2023-05-17

## 2023-05-30 NOTE — PHYSICAL EXAM
[Well Developed] : well developed [Well Nourished] : well nourished [No Acute Distress] : no acute distress [Normal Conjunctiva] : normal conjunctiva [Normal Venous Pressure] : normal venous pressure [No Carotid Bruit] : no carotid bruit [Normal S1, S2] : normal S1, S2 [No Murmur] : no murmur [No Rub] : no rub [No Gallop] : no gallop [Clear Lung Fields] : clear lung fields [Good Air Entry] : good air entry [No Respiratory Distress] : no respiratory distress  [Non Tender] : non-tender [Soft] : abdomen soft [No Masses/organomegaly] : no masses/organomegaly [Normal Bowel Sounds] : normal bowel sounds [Normal Gait] : normal gait [No Edema] : no edema [No Cyanosis] : no cyanosis [No Clubbing] : no clubbing [No Varicosities] : no varicosities [No Skin Lesions] : no skin lesions [No Rash] : no rash [Moves all extremities] : moves all extremities [No Focal Deficits] : no focal deficits [Normal Speech] : normal speech [Alert and Oriented] : alert and oriented [Normal memory] : normal memory [de-identified] : irregular rhythm

## 2023-05-30 NOTE — CARDIOLOGY SUMMARY
[de-identified] : today:\par 3/51062: sinus with sinus arrhythmia and AV wenckebach [de-identified] : Zio XT 3/10 - 3/15/2023\par Frequent SVE (5.4%)\par Frequent VE of different morphologies, including 33 runs of ventricular tachycardia the longest being 10.4 seconds\par AV Wenckebach also present.\par \par Zio XT 5/23 - 5/26/2022\par VE= 12.4%, SVE= 6.4%, including PAT, and NSVT\par Wenckebach was present [de-identified] : 3/8/2023\par Mitral Valve: Posterior mitral leaflet prolapse.  Mild\par mitral regurgitation directed anteromedially.\par Aortic Valve/Aorta: Calcified aortic valve with normal\par opening.\par Normal aortic root size.  \par Left Atrium: Mildly dilated left atrium.   \par Left Ventricle: Normal left ventricular internal dimensions\par and wall thicknesses.\par Normal left ventricular systolic function. No segmental\par wall motion abnormalities. \par Normal diastolic function.\par Right Heart: Normal right atrium. Normal right ventricular\par size and function. \par Normal tricuspid valve. Minimal tricuspid regurgitation. \par Normal pulmonic valve. Minimal pulmonic regurgitation.\par Pericardium/Pleura: Normal pericardium with no pericardial\par effusion.\par Hemodynamic: IVC is normal. Pulmonary artery pressure is\par normal.\par \par 12/5/2022\par MAC with moderate mitral regurgitation\par Increased relative wall thickness with normal LV mass index, c/w concentric LV remodelling\par No segmental wall motion abnormalities. Normal LV systolic function (LVEF= 60-65% by visual estimate).\par Mild diastolic dysfunction.

## 2023-05-30 NOTE — HISTORY OF PRESENT ILLNESS
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 presents for an evaluation of nearsyncope. \par \par He is very active and exercises avidly.  He walks the golf course and goes to the gym frequently.  He had an evaluation for near syncope.  At 4 am he got up to urinate.  He finised and then was walking back to the bed when he felt a prodrome.  He sat down on the couch and was able to abort the episode, ie he did not lose consciousness.  He felt very weak after and EMS was called.  Even when they got there he was too weak to get up.  He has never had any episodes prior and none since.  He is now drinking/hydrating more, but there is concern for the results of his extended monitor (AV block and NSVT).  He had no symptoms while wearing the Zio, but he did have several confrontations with his wife.  He does explain that he has a history of "arrhythmia" for many years.

## 2023-05-30 NOTE — DISCUSSION/SUMMARY
[FreeTextEntry1] : 83 year old man with a recent evaluation secondary to post micturition near syncope.  This evaluation has been significant for frequent atrial and ventricular ectopy as well as resting AV block (Wenckebach).  Of note, this monitor is almost identical to the study from May of 2022.  We discussed vasovagal syncope and empiric lifestyle modifications.  We also discussed his rhythm in detail.  I suggested further assessment of his substrate.  Specifically I am interested in ruling out amyloid with his arrhythmias, conduction disease, low voltage in his limb leads with echo showing concentric LV remodelling.  We will discuss further after this assessment.  May ultimately need ILR.

## 2023-07-25 ENCOUNTER — APPOINTMENT (OUTPATIENT)
Dept: CARDIOLOGY | Facility: CLINIC | Age: 83
End: 2023-07-25
Payer: MEDICARE

## 2023-07-25 ENCOUNTER — NON-APPOINTMENT (OUTPATIENT)
Age: 83
End: 2023-07-25

## 2023-07-25 VITALS — DIASTOLIC BLOOD PRESSURE: 72 MMHG | SYSTOLIC BLOOD PRESSURE: 136 MMHG

## 2023-07-25 VITALS
SYSTOLIC BLOOD PRESSURE: 130 MMHG | WEIGHT: 160 LBS | DIASTOLIC BLOOD PRESSURE: 70 MMHG | BODY MASS INDEX: 23.63 KG/M2 | HEART RATE: 71 BPM | OXYGEN SATURATION: 96 %

## 2023-07-25 DIAGNOSIS — I44.1 ATRIOVENTRICULAR BLOCK, SECOND DEGREE: ICD-10-CM

## 2023-07-25 PROCEDURE — 93040 RHYTHM ECG WITH REPORT: CPT | Mod: 59

## 2023-07-25 PROCEDURE — 93000 ELECTROCARDIOGRAM COMPLETE: CPT

## 2023-07-25 PROCEDURE — 99214 OFFICE O/P EST MOD 30 MIN: CPT

## 2023-07-25 NOTE — HISTORY OF PRESENT ILLNESS
[FreeTextEntry1] : 11/8/2021 - Office visit:\par He denies chest pain, shortness of breath, and palpitations.\par \par 02/08/2022 - Office visit:\par He denies chest pain, shortness of breath, palpitations, and dizziness.\par He is expecting his second great grandchild.\par \par 05/23/2022 - Office visit:\par He denies chest pain, shortness of breath, palpitations, and dizziness.\par \par 08/29/2022 - Office visit:\par He will be seeing Dr. Guevara for pain radiating down his lateral right lower extremity after sitting and for numbness/tingling in his right upper extremity after he wakes up.\par He plays golf 2-3 times per week and treads water in the pool.\par At home, his blood pressures are 117-125/70-75.\par He denies chest pain, shortness of breath, and palpitations.\par \par 12/05/2022 - Office visit:\par Blood pressures at home are 117-128/68-80.\par He denies chest pain, shortness of breath, palpitations, and dizziness.\par Significant stress.\par \par 03/06/2023 - Office visit:\par His blood pressures at home are 122-128/74-80; he checks 2-3 times per week.  His blood pressure yesterday was 124/74.  Heart rates are 56-62.\par He denies chest pain, shortness of breath, palpitations, and dizziness.\par \par 3/10/2023- Office visit with Emily Kinsey NP:\par Bryan Ballard returns today for follow up after a recent emergency room visit. He began to feel dizzy and felt his knees buckle after going to the bathroom. He did not lose consciousness and was able to brace himself from a fall.  His wife called EMS and he was evaluated at [facility]. During his stay a CT angiography of the neck revealed a tiny 1-2 mm outpouching of the right internal carotid artery and he has been referred to vascular surgery for further evaluation. He was diagnosed with micturition syncope and recommended back for outpatient follow up. During his stay his heart rate was noted to be on the lower side which he reports is his usual. Otherwise today he is feeling well and has no specific complaints aside from ongoing worries about his wife's health. \par \par 05/10/2023 - Office visit:\par Blood pressures at home are 120-128/68-77.\par No additional near syncopal episodes.  He denies dizziness, chest pain, shortness of breath, and palpitations.\par Amyloid work-up negative.\par \par 07/25/2023 - Office visit:\par Mechanical fall 3 to 4 weeks ago with injury to his left chest. His symptoms eventually resolved.\par He has been experiencing right flank pain, worse at night, but present (although much less severe) during the day.\par He denies chest pain, shortness of breath, palpitations, and dizziness.\par \par \par \par

## 2023-08-07 NOTE — ANESTHESIA FOLLOW-UP NOTE - NSEVALATION_GEN_ALL_CORE
----- Message from Corina Devlin sent at 8/7/2023  9:46 AM CDT -----  Type:  Same Day Appointment Request    Caller is requesting a same day appointment.  Caller declined first available appointment listed below.      Name of Caller:  pt  When is the first available appointment?  8/18--said she need to be seen today--please call and advise  Symptoms:  BP high since Wednesday of last week--said she bee keeping up with it  Best Call Back Number:  [phone]     Additional Information:   thank you        
No apparent complications or complaints regarding anesthesia care at this time/All questions were answered
No apparent complications or complaints regarding anesthesia care at this time/All questions were answered

## 2023-09-19 ENCOUNTER — OUTPATIENT (OUTPATIENT)
Dept: OUTPATIENT SERVICES | Facility: HOSPITAL | Age: 83
LOS: 1 days | End: 2023-09-19
Payer: MEDICARE

## 2023-09-19 ENCOUNTER — APPOINTMENT (OUTPATIENT)
Dept: MRI IMAGING | Facility: CLINIC | Age: 83
End: 2023-09-19
Payer: MEDICARE

## 2023-09-19 DIAGNOSIS — Z90.89 ACQUIRED ABSENCE OF OTHER ORGANS: Chronic | ICD-10-CM

## 2023-09-19 DIAGNOSIS — Z90.49 ACQUIRED ABSENCE OF OTHER SPECIFIED PARTS OF DIGESTIVE TRACT: Chronic | ICD-10-CM

## 2023-09-19 DIAGNOSIS — Z96.643 PRESENCE OF ARTIFICIAL HIP JOINT, BILATERAL: Chronic | ICD-10-CM

## 2023-09-19 DIAGNOSIS — Z98.41 CATARACT EXTRACTION STATUS, RIGHT EYE: Chronic | ICD-10-CM

## 2023-09-19 DIAGNOSIS — I67.1 CEREBRAL ANEURYSM, NONRUPTURED: ICD-10-CM

## 2023-09-19 PROCEDURE — 70544 MR ANGIOGRAPHY HEAD W/O DYE: CPT | Mod: 26,MH

## 2023-09-19 PROCEDURE — 70544 MR ANGIOGRAPHY HEAD W/O DYE: CPT

## 2023-09-20 ENCOUNTER — RX RENEWAL (OUTPATIENT)
Age: 83
End: 2023-09-20

## 2023-09-20 RX ORDER — AMLODIPINE BESYLATE 10 MG/1
10 TABLET ORAL
Qty: 90 | Refills: 3 | Status: ACTIVE | COMMUNITY
Start: 2018-07-23 | End: 1900-01-01

## 2023-09-27 ENCOUNTER — APPOINTMENT (OUTPATIENT)
Dept: NEUROSURGERY | Facility: CLINIC | Age: 83
End: 2023-09-27
Payer: MEDICARE

## 2023-09-27 VITALS
HEIGHT: 69 IN | HEART RATE: 57 BPM | DIASTOLIC BLOOD PRESSURE: 65 MMHG | BODY MASS INDEX: 23.7 KG/M2 | OXYGEN SATURATION: 94 % | WEIGHT: 160 LBS | SYSTOLIC BLOOD PRESSURE: 99 MMHG

## 2023-09-27 PROCEDURE — 99214 OFFICE O/P EST MOD 30 MIN: CPT

## 2023-11-06 ENCOUNTER — APPOINTMENT (OUTPATIENT)
Dept: CARDIOLOGY | Facility: CLINIC | Age: 83
End: 2023-11-06
Payer: MEDICARE

## 2023-11-06 ENCOUNTER — NON-APPOINTMENT (OUTPATIENT)
Age: 83
End: 2023-11-06

## 2023-11-06 VITALS
DIASTOLIC BLOOD PRESSURE: 80 MMHG | BODY MASS INDEX: 23.92 KG/M2 | HEART RATE: 65 BPM | OXYGEN SATURATION: 99 % | WEIGHT: 162 LBS | SYSTOLIC BLOOD PRESSURE: 130 MMHG

## 2023-11-06 VITALS — SYSTOLIC BLOOD PRESSURE: 134 MMHG | DIASTOLIC BLOOD PRESSURE: 70 MMHG

## 2023-11-06 PROCEDURE — 93306 TTE W/DOPPLER COMPLETE: CPT

## 2023-11-06 PROCEDURE — 90662 IIV NO PRSV INCREASED AG IM: CPT

## 2023-11-06 PROCEDURE — G0008: CPT

## 2023-11-06 PROCEDURE — 93040 RHYTHM ECG WITH REPORT: CPT | Mod: 59

## 2023-11-06 PROCEDURE — 93000 ELECTROCARDIOGRAM COMPLETE: CPT

## 2023-11-06 PROCEDURE — 99214 OFFICE O/P EST MOD 30 MIN: CPT

## 2023-11-08 ENCOUNTER — NON-APPOINTMENT (OUTPATIENT)
Age: 83
End: 2023-11-08

## 2023-12-21 ENCOUNTER — APPOINTMENT (OUTPATIENT)
Dept: INTERNAL MEDICINE | Facility: CLINIC | Age: 83
End: 2023-12-21
Payer: MEDICARE

## 2023-12-21 VITALS — DIASTOLIC BLOOD PRESSURE: 70 MMHG | SYSTOLIC BLOOD PRESSURE: 130 MMHG

## 2023-12-21 VITALS
OXYGEN SATURATION: 97 % | HEART RATE: 87 BPM | TEMPERATURE: 97.8 F | HEIGHT: 69 IN | WEIGHT: 157 LBS | BODY MASS INDEX: 23.25 KG/M2 | SYSTOLIC BLOOD PRESSURE: 144 MMHG | DIASTOLIC BLOOD PRESSURE: 79 MMHG

## 2023-12-21 DIAGNOSIS — M54.9 DORSALGIA, UNSPECIFIED: ICD-10-CM

## 2023-12-21 DIAGNOSIS — R55 SYNCOPE AND COLLAPSE: ICD-10-CM

## 2023-12-21 DIAGNOSIS — H90.3 SENSORINEURAL HEARING LOSS, BILATERAL: ICD-10-CM

## 2023-12-21 DIAGNOSIS — N52.9 MALE ERECTILE DYSFUNCTION, UNSPECIFIED: ICD-10-CM

## 2023-12-21 PROCEDURE — G0439: CPT

## 2023-12-21 PROCEDURE — 99205 OFFICE O/P NEW HI 60 MIN: CPT | Mod: 25

## 2023-12-21 PROCEDURE — G0444 DEPRESSION SCREEN ANNUAL: CPT | Mod: 59

## 2023-12-21 NOTE — HEALTH RISK ASSESSMENT
[Good] : ~his/her~  mood as  good [No] : In the past 12 months have you used drugs other than those required for medical reasons? No [No falls in past year] : Patient reported no falls in the past year [0] : 2) Feeling down, depressed, or hopeless: Not at all (0) [PHQ-2 Negative - No further assessment needed] : PHQ-2 Negative - No further assessment needed [de-identified] : active - golf, gym  [de-identified] : balanced varied diet without restrictions  [[id number]] : 0 [Patient declined colonoscopy] : Patient declined colonoscopy [HIV test declined] : HIV test declined [Hepatitis C test declined] : Hepatitis C test declined [With Family] : lives with family [Retired] : retired [Graduate School] : graduate school [] :  [# Of Children ___] : has [unfilled] children [High Risk Behavior] : no high risk behavior [Fully functional (bathing, dressing, toileting, transferring, walking, feeding)] : Fully functional (bathing, dressing, toileting, transferring, walking, feeding) [Fully functional (using the telephone, shopping, preparing meals, housekeeping, doing laundry, using] : Fully functional and needs no help or supervision to perform IADLs (using the telephone, shopping, preparing meals, housekeeping, doing laundry, using transportation, managing medications and managing finances) [Reports changes in hearing] : Reports no changes in hearing [Reports changes in vision] : Reports no changes in vision [ColonoscopyComments] : no further  [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 [Time Spent: ___ minutes] : Time Spent: [unfilled] minutes [AdvancecareDate] : 12/21/2023 [Never] : Never

## 2023-12-21 NOTE — HISTORY OF PRESENT ILLNESS
[FreeTextEntry1] : Patient presents for comprehensive medical evaluation today.  [de-identified] : PMH - HTN, HLD, GERD, lumbar stenosis  Patient has been in good overall health this past year and has no active complaints. Primary concern is psychosocial stressors - otherwise has been feeling well.   Dr. Milan Vasquez - Cardiologist  Has Urologist

## 2023-12-21 NOTE — PLAN
[FreeTextEntry1] : #elevated PSA  [place] - Urologist  [ ] will follow-up with urology regarding elevated PSA  #HTN Patient with history of hypertension.  BP today acceptable Patient to continue current regimen as prescribed. Patient instructed to continue monitoring blood pressure at home and to keep a journal. Will continue to monitor patient's bp and adjust his hypertensive regimen as needed.

## 2024-03-13 NOTE — PATIENT PROFILE ADULT. - FALL HARM RISK TYPE OF ASSESSMENT
score)  Never done    Respiratory Syncytial Virus (RSV) Pregnant or age 60 yrs+ (1 - 1-dose 60+ series) Never done    Flu vaccine (1) Never done    Annual Wellness Visit (Medicare Advantage)  Never done       exam, some screening tests are recommended:    Bone mass measurement (dexa scan) is recommended every two years  Diabetes Mellitus screening is recommended every year.    Glaucoma is an eye disease caused by high pressure in the eye.  An eye exam is recommended every year.     Cardiovascular screening tests that check your cholesterol and other blood fat (lipid) levels are recommended every five years.     Colorectal Cancer screening tests help to find pre-cancerous polyps (growths in the colon) so they can be removed before they turn into cancer.  Tests ordered for screening depend on your personal and family history risk factors.    Screening for Breast Cancer is recommended yearly with a mammogram.    Screening for Cervical Cancer is recommended every two years (annually for certain risk factors, such as previous history of STD or abnormal PAP in past 7 years), with a Pelvic Exam with PAP    Here is a list of your current Health Maintenance items with a due date:  Health Maintenance   Topic Date Due    COVID-19 Vaccine (1) Never done    DTaP/Tdap/Td vaccine (1 - Tdap) Never done    Shingles vaccine (1 of 2) Never done    DEXA (modify frequency per FRAX score)  Never done    Respiratory Syncytial Virus (RSV) Pregnant or age 60 yrs+ (1 - 1-dose 60+ series) Never done    Flu vaccine (1) Never done    Lipids  01/30/2025    Depression Monitoring  03/13/2025    Annual Wellness Visit (Medicare Advantage)  Completed    Pneumococcal 65+ years Vaccine  Completed    Hepatitis A vaccine  Aged Out    Hepatitis B vaccine  Aged Out    Hib vaccine  Aged Out    Polio vaccine  Aged Out    Meningococcal (ACWY) vaccine  Aged Out    Depression Screen  Discontinued      Admission

## 2024-03-26 ENCOUNTER — NON-APPOINTMENT (OUTPATIENT)
Age: 84
End: 2024-03-26

## 2024-03-28 ENCOUNTER — APPOINTMENT (OUTPATIENT)
Dept: CARDIOLOGY | Facility: CLINIC | Age: 84
End: 2024-03-28

## 2024-04-02 ENCOUNTER — NON-APPOINTMENT (OUTPATIENT)
Age: 84
End: 2024-04-02

## 2024-04-02 ENCOUNTER — APPOINTMENT (OUTPATIENT)
Dept: CARDIOLOGY | Facility: CLINIC | Age: 84
End: 2024-04-02
Payer: MEDICARE

## 2024-04-02 VITALS
HEART RATE: 86 BPM | OXYGEN SATURATION: 96 % | SYSTOLIC BLOOD PRESSURE: 144 MMHG | WEIGHT: 158 LBS | BODY MASS INDEX: 23.33 KG/M2 | DIASTOLIC BLOOD PRESSURE: 82 MMHG

## 2024-04-02 VITALS — DIASTOLIC BLOOD PRESSURE: 74 MMHG | SYSTOLIC BLOOD PRESSURE: 130 MMHG

## 2024-04-02 DIAGNOSIS — I49.3 VENTRICULAR PREMATURE DEPOLARIZATION: ICD-10-CM

## 2024-04-02 DIAGNOSIS — I34.0 NONRHEUMATIC MITRAL (VALVE) INSUFFICIENCY: ICD-10-CM

## 2024-04-02 DIAGNOSIS — I49.1 ATRIAL PREMATURE DEPOLARIZATION: ICD-10-CM

## 2024-04-02 PROCEDURE — 93000 ELECTROCARDIOGRAM COMPLETE: CPT

## 2024-04-02 PROCEDURE — G2211 COMPLEX E/M VISIT ADD ON: CPT

## 2024-04-02 PROCEDURE — 99214 OFFICE O/P EST MOD 30 MIN: CPT

## 2024-04-02 PROCEDURE — 93040 RHYTHM ECG WITH REPORT: CPT | Mod: 59

## 2024-04-02 PROCEDURE — 36415 COLL VENOUS BLD VENIPUNCTURE: CPT

## 2024-04-05 ENCOUNTER — OUTPATIENT (OUTPATIENT)
Dept: OUTPATIENT SERVICES | Facility: HOSPITAL | Age: 84
LOS: 1 days | End: 2024-04-05
Payer: MEDICARE

## 2024-04-05 ENCOUNTER — APPOINTMENT (OUTPATIENT)
Dept: RADIOLOGY | Facility: CLINIC | Age: 84
End: 2024-04-05
Payer: MEDICARE

## 2024-04-05 ENCOUNTER — APPOINTMENT (OUTPATIENT)
Dept: INTERNAL MEDICINE | Facility: CLINIC | Age: 84
End: 2024-04-05
Payer: MEDICARE

## 2024-04-05 VITALS
DIASTOLIC BLOOD PRESSURE: 88 MMHG | BODY MASS INDEX: 23.4 KG/M2 | SYSTOLIC BLOOD PRESSURE: 133 MMHG | HEIGHT: 69 IN | TEMPERATURE: 98.1 F | OXYGEN SATURATION: 93 % | HEART RATE: 87 BPM | WEIGHT: 158 LBS

## 2024-04-05 DIAGNOSIS — Z96.643 PRESENCE OF ARTIFICIAL HIP JOINT, BILATERAL: Chronic | ICD-10-CM

## 2024-04-05 DIAGNOSIS — I67.1 CEREBRAL ANEURYSM, NONRUPTURED: ICD-10-CM

## 2024-04-05 DIAGNOSIS — I47.10 SUPRAVENTRICULAR TACHYCARDIA, UNSPECIFIED: ICD-10-CM

## 2024-04-05 DIAGNOSIS — R05.9 COUGH, UNSPECIFIED: ICD-10-CM

## 2024-04-05 DIAGNOSIS — C43.9 MALIGNANT MELANOMA OF SKIN, UNSPECIFIED: ICD-10-CM

## 2024-04-05 DIAGNOSIS — K21.9 GASTRO-ESOPHAGEAL REFLUX DISEASE W/OUT ESOPHAGITIS: ICD-10-CM

## 2024-04-05 DIAGNOSIS — I47.29 OTHER VENTRICULAR TACHYCARDIA: ICD-10-CM

## 2024-04-05 DIAGNOSIS — Z90.49 ACQUIRED ABSENCE OF OTHER SPECIFIED PARTS OF DIGESTIVE TRACT: Chronic | ICD-10-CM

## 2024-04-05 DIAGNOSIS — I77.810 THORACIC AORTIC ECTASIA: ICD-10-CM

## 2024-04-05 DIAGNOSIS — Z98.41 CATARACT EXTRACTION STATUS, RIGHT EYE: Chronic | ICD-10-CM

## 2024-04-05 DIAGNOSIS — Z90.89 ACQUIRED ABSENCE OF OTHER ORGANS: Chronic | ICD-10-CM

## 2024-04-05 PROCEDURE — 99214 OFFICE O/P EST MOD 30 MIN: CPT

## 2024-04-05 PROCEDURE — 71046 X-RAY EXAM CHEST 2 VIEWS: CPT | Mod: 26

## 2024-04-05 PROCEDURE — G2211 COMPLEX E/M VISIT ADD ON: CPT

## 2024-04-05 PROCEDURE — 71046 X-RAY EXAM CHEST 2 VIEWS: CPT

## 2024-04-05 RX ORDER — METHYLPREDNISOLONE 4 MG/1
4 TABLET ORAL
Qty: 1 | Refills: 0 | Status: ACTIVE | COMMUNITY
Start: 2024-04-05 | End: 1900-01-01

## 2024-04-06 ENCOUNTER — NON-APPOINTMENT (OUTPATIENT)
Age: 84
End: 2024-04-06

## 2024-04-08 NOTE — HISTORY OF PRESENT ILLNESS
[FreeTextEntry1] : Patient presents today for follow-up of chronic medical conditions.  [de-identified] : PMH - HTN, HLD, GERD, lumbar stenosis  Patient with ongoing lingering cough for the last several weeks Patient was treated with course of abx by [facility]  [place] better initially- but the cough remains  Denies fever or systemic sx - otherwise feels well   Dr. Milan Vasquez - Cardiologist  Has Urologist

## 2024-04-08 NOTE — HEALTH RISK ASSESSMENT
[Good] : ~his/her~  mood as  good [No] : In the past 12 months have you used drugs other than those required for medical reasons? No [No falls in past year] : Patient reported no falls in the past year [0] : 2) Feeling down, depressed, or hopeless: Not at all (0) [PHQ-2 Negative - No further assessment needed] : PHQ-2 Negative - No further assessment needed [With Patient/Caregiver] : , with patient/caregiver [Reviewed updated] : Reviewed, updated [Designated Healthcare Proxy] : Designated healthcare proxy [Name: ___] : Health Care Proxy's Name: [unfilled]  [Relationship: ___] : Relationship: [unfilled] [I will adhere to the patient's wishes.] : I will adhere to the patient's wishes. [Time Spent: ___ minutes] : Time Spent: [unfilled] minutes [Never] : Never [Patient declined colonoscopy] : Patient declined colonoscopy [HIV test declined] : HIV test declined [Hepatitis C test declined] : Hepatitis C test declined [With Family] : lives with family [Retired] : retired [Graduate School] : graduate school [] :  [# Of Children ___] : has [unfilled] children [Fully functional (bathing, dressing, toileting, transferring, walking, feeding)] : Fully functional (bathing, dressing, toileting, transferring, walking, feeding) [Fully functional (using the telephone, shopping, preparing meals, housekeeping, doing laundry, using] : Fully functional and needs no help or supervision to perform IADLs (using the telephone, shopping, preparing meals, housekeeping, doing laundry, using transportation, managing medications and managing finances) [de-identified] : active - golf, gym  [de-identified] : balanced varied diet without restrictions  [[id number]] : 0 [AdvancecareDate] : 12/21/2023 [High Risk Behavior] : no high risk behavior [Reports changes in hearing] : Reports no changes in hearing [Reports changes in vision] : Reports no changes in vision [ColonoscopyComments] : no further  [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

## 2024-04-09 LAB
ALBUMIN SERPL ELPH-MCNC: 4.4 G/DL
ALP BLD-CCNC: 67 U/L
ALT SERPL-CCNC: 24 U/L
ANION GAP SERPL CALC-SCNC: 14 MMOL/L
AST SERPL-CCNC: 39 U/L
BILIRUB SERPL-MCNC: 0.4 MG/DL
BUN SERPL-MCNC: 22 MG/DL
CALCIUM SERPL-MCNC: 10.3 MG/DL
CHLORIDE SERPL-SCNC: 106 MMOL/L
CO2 SERPL-SCNC: 22 MMOL/L
CREAT SERPL-MCNC: 1 MG/DL
EGFR: 74 ML/MIN/1.73M2
GLUCOSE SERPL-MCNC: 112 MG/DL
POTASSIUM SERPL-SCNC: 5.5 MMOL/L
PROT SERPL-MCNC: 7.6 G/DL
SODIUM SERPL-SCNC: 142 MMOL/L

## 2024-04-11 NOTE — DISCUSSION/SUMMARY
Before Your Child s Surgery or Sedated Procedure      Please call the doctor if there s any change in your child s health, including signs of a cold or flu (sore throat, runny nose, cough, rash or fever). If your child is having surgery, call the surgeon s office. If your child is having another procedure, call your family doctor.    Do not give over-the-counter medicine within 24 hours of the surgery or procedure (unless the doctor tells you to).    If your child takes prescribed drugs: Ask the doctor which medicines are safe to take before the surgery or procedure.    Follow the care team s instructions for eating and drinking before surgery or procedure.     Have your child take a shower or bath the night before surgery, cleaning their skin gently. Use the soap the surgeon gave you. If you were not given special soap, use your regular soap. Do not shave or scrub the surgery site.    Have your child wear clean pajamas and use clean sheets on their bed.   [FreeTextEntry1] : PLAN (ADDITIONAL): Review echocardiogram Continue simvastatin 20 daily OV 3 months Emily Kinsey, will see Dr. Diaz after that  PHYSICAL EXAMINATION: Constitutional: well developed, well nourished, no acute distress Cardiovascular: rhythm was irregular, normal S1 and S2, 1/6 systolic murmur; without jugular venous distention Respiratory: no respiratory distress, lungs clear to auscultation bilaterally GI: soft, non-tender, no abdominal mass palpated, no hepatosplenomegaly, bowel sounds normal Extremities: without clubbing, cyanosis, or edema Musculoskeletal: gait sufficient for exercise testing Neurologic: oriented X 3 Psych: affect normal

## 2024-04-11 NOTE — HISTORY OF PRESENT ILLNESS
[FreeTextEntry1] : 11/8/2021 - Office visit: He denies chest pain, shortness of breath, and palpitations.  02/08/2022 - Office visit: He denies chest pain, shortness of breath, palpitations, and dizziness. He is expecting his second great grandchild.  05/23/2022 - Office visit: He denies chest pain, shortness of breath, palpitations, and dizziness.  08/29/2022 - Office visit: He will be seeing Dr. Guevara for pain radiating down his lateral right lower extremity after sitting and for numbness/tingling in his right upper extremity after he wakes up. He plays golf 2-3 times per week and treads water in the pool. At home, his blood pressures are 117-125/70-75. He denies chest pain, shortness of breath, and palpitations.  12/05/2022 - Office visit: Blood pressures at home are 117-128/68-80. He denies chest pain, shortness of breath, palpitations, and dizziness. Significant stress.  03/06/2023 - Office visit: His blood pressures at home are 122-128/74-80; he checks 2-3 times per week.  His blood pressure yesterday was 124/74.  Heart rates are 56-62. He denies chest pain, shortness of breath, palpitations, and dizziness.  3/10/2023- Office visit with Emily Kinsey NP: Bryan Ballard returns today for follow up after a recent emergency room visit. He began to feel dizzy and felt his knees buckle after going to the bathroom. He did not lose consciousness and was able to brace himself from a fall.  His wife called EMS and he was evaluated at [facility]. During his stay a CT angiography of the neck revealed a tiny 1-2 mm outpouching of the right internal carotid artery and he has been referred to vascular surgery for further evaluation. He was diagnosed with micturition syncope and recommended back for outpatient follow up. During his stay his heart rate was noted to be on the lower side which he reports is his usual. Otherwise today he is feeling well and has no specific complaints aside from ongoing worries about his wife's health.   05/10/2023 - Office visit: Blood pressures at home are 120-128/68-77. No additional near syncopal episodes.  He denies dizziness, chest pain, shortness of breath, and palpitations. Amyloid work-up negative.  07/25/2023 - Office visit: Mechanical fall 3 to 4 weeks ago with injury to his left chest. His symptoms eventually resolved. He has been experiencing right flank pain, worse at night, but present (although much less severe) during the day. He denies chest pain, shortness of breath, palpitations, and dizziness.  11/06/2023 - Office visit: Golfs and goes to the gym 2-3 times per week. He denies chest pain, shortness of breath, palpitations, and dizziness.  04/02/2024 - Office visit: He denies chest pain, shortness of breath, and palpitations.

## 2024-06-18 ENCOUNTER — APPOINTMENT (OUTPATIENT)
Dept: CARDIOLOGY | Facility: CLINIC | Age: 84
End: 2024-06-18
Payer: MEDICARE

## 2024-06-18 ENCOUNTER — NON-APPOINTMENT (OUTPATIENT)
Age: 84
End: 2024-06-18

## 2024-06-18 VITALS
WEIGHT: 160 LBS | OXYGEN SATURATION: 99 % | BODY MASS INDEX: 23.7 KG/M2 | DIASTOLIC BLOOD PRESSURE: 72 MMHG | HEIGHT: 69 IN | HEART RATE: 66 BPM | SYSTOLIC BLOOD PRESSURE: 129 MMHG | RESPIRATION RATE: 17 BRPM

## 2024-06-18 DIAGNOSIS — I10 ESSENTIAL (PRIMARY) HYPERTENSION: ICD-10-CM

## 2024-06-18 DIAGNOSIS — E78.00 PURE HYPERCHOLESTEROLEMIA, UNSPECIFIED: ICD-10-CM

## 2024-06-18 DIAGNOSIS — R00.1 BRADYCARDIA, UNSPECIFIED: ICD-10-CM

## 2024-06-18 PROCEDURE — 99214 OFFICE O/P EST MOD 30 MIN: CPT

## 2024-06-18 PROCEDURE — 93000 ELECTROCARDIOGRAM COMPLETE: CPT

## 2024-06-20 NOTE — DISCUSSION/SUMMARY
[FreeTextEntry1] : PLAN (ADDITIONAL): Continue simvastatin 20 daily ECG today shows sinus bradycardia with multiform PVCs, rate 55 BPM. Will arrange for an extended cardiac monitor to further assess his rhythm.   Follow up pending results.  Will see Dr. Diaz after that  PHYSICAL EXAMINATION: Constitutional: well developed, well nourished, no acute distress Cardiovascular: rhythm was irregular, normal S1 and S2, 1/6 systolic murmur; without jugular venous distention Respiratory: no respiratory distress, lungs clear to auscultation bilaterally GI: soft, non-tender, no abdominal mass palpated, no hepatosplenomegaly, bowel sounds normal Extremities: without clubbing, cyanosis, or edema Musculoskeletal: gait sufficient for exercise testing Neurologic: oriented X 3 Psych: affect normal [EKG obtained to assist in diagnosis and management of assessed problem(s)] : EKG obtained to assist in diagnosis and management of assessed problem(s)

## 2024-06-20 NOTE — HISTORY OF PRESENT ILLNESS
[FreeTextEntry1] : 11/8/2021 - Office visit: He denies chest pain, shortness of breath, and palpitations.  02/08/2022 - Office visit: He denies chest pain, shortness of breath, palpitations, and dizziness. He is expecting his second great grandchild.  05/23/2022 - Office visit: He denies chest pain, shortness of breath, palpitations, and dizziness.  08/29/2022 - Office visit: He will be seeing Dr. Guevara for pain radiating down his lateral right lower extremity after sitting and for numbness/tingling in his right upper extremity after he wakes up. He plays golf 2-3 times per week and treads water in the pool. At home, his blood pressures are 117-125/70-75. He denies chest pain, shortness of breath, and palpitations.  12/05/2022 - Office visit: Blood pressures at home are 117-128/68-80. He denies chest pain, shortness of breath, palpitations, and dizziness. Significant stress.  03/06/2023 - Office visit: His blood pressures at home are 122-128/74-80; he checks 2-3 times per week.  His blood pressure yesterday was 124/74.  Heart rates are 56-62. He denies chest pain, shortness of breath, palpitations, and dizziness.  3/10/2023- Office visit with Emily Kinsey NP: Bryan Ballard returns today for follow up after a recent emergency room visit. He began to feel dizzy and felt his knees buckle after going to the bathroom. He did not lose consciousness and was able to brace himself from a fall.  His wife called EMS and he was evaluated at [facility]. During his stay a CT angiography of the neck revealed a tiny 1-2 mm outpouching of the right internal carotid artery and he has been referred to vascular surgery for further evaluation. He was diagnosed with micturition syncope and recommended back for outpatient follow up. During his stay his heart rate was noted to be on the lower side which he reports is his usual. Otherwise today he is feeling well and has no specific complaints aside from ongoing worries about his wife's health.   05/10/2023 - Office visit: Blood pressures at home are 120-128/68-77. No additional near syncopal episodes.  He denies dizziness, chest pain, shortness of breath, and palpitations. Amyloid work-up negative.  07/25/2023 - Office visit: Mechanical fall 3 to 4 weeks ago with injury to his left chest. His symptoms eventually resolved. He has been experiencing right flank pain, worse at night, but present (although much less severe) during the day. He denies chest pain, shortness of breath, palpitations, and dizziness.  11/06/2023 - Office visit: Golfs and goes to the gym 2-3 times per week. He denies chest pain, shortness of breath, palpitations, and dizziness.  04/02/2024 - Office visit: He denies chest pain, shortness of breath, and palpitations.  6/18/2024- Office visit: He feels great. He treads water for 20 minutes daily and also does laps. He also plays golf. No chest discomfort, shortness of breath, palpitations, lightheadedness or syncope. Looking forward to traveling to [facility] to visit his great grandchildren who will be moving there soon.

## 2024-07-31 ENCOUNTER — APPOINTMENT (OUTPATIENT)
Dept: ORTHOPEDIC SURGERY | Facility: CLINIC | Age: 84
End: 2024-07-31
Payer: MEDICARE

## 2024-07-31 VITALS
HEART RATE: 42 BPM | DIASTOLIC BLOOD PRESSURE: 76 MMHG | HEIGHT: 69 IN | SYSTOLIC BLOOD PRESSURE: 146 MMHG | BODY MASS INDEX: 23.7 KG/M2 | WEIGHT: 160 LBS

## 2024-07-31 DIAGNOSIS — G56.01 CARPAL TUNNEL SYNDROME, RIGHT UPPER LIMB: ICD-10-CM

## 2024-07-31 PROCEDURE — 72050 X-RAY EXAM NECK SPINE 4/5VWS: CPT

## 2024-07-31 PROCEDURE — 99214 OFFICE O/P EST MOD 30 MIN: CPT

## 2024-07-31 NOTE — DISCUSSION/SUMMARY
[Medication Risks Reviewed] : Medication risks reviewed [de-identified] : Assessment: - Summary : Patient's symptoms along with physical examination suggest Carpal Tunnel Syndrome in the right hand. The heart condition mentioned suggests requirement for anticoagulant medication. - Problems : - Carpal Tunnel Syndrome (Suspected)  - Heart Disease  - Differential Diagnosis : - Carpal Tunnel Syndrome  - Peripheral Neuropathy  - Cervical Disc Degeneration  Plan: - Summary : The primary plan involves a non-invasive approach with a wrist splint for Carpal Tunnel Syndrome. The splint will ensure that the wrist does not bend into aggravating positions, specially during sleep. Patient has been advised to report progress and any exacerbated symptoms. Xarelto proposed by patient's cardiologist was agreed upon. - Plan : - Prescribe wrist splint for night use for his carpal tunnel syndrome right wrist.  - Advise the patient to monitor and report new or worsened symptoms in order to reconsider imaging or nerve conduction studies.  This would involve electrodiagnostic studies with the neurologist.  - Agreed to patient starting Xarelto for heart disease.  - Continue to provide support for his caregiving role.

## 2024-07-31 NOTE — HISTORY OF PRESENT ILLNESS
[8] : a current pain level of 8/10 [Daily] : ~He/She~ states the symptoms seem to be occuring daily [de-identified] : Patient is here today for evaluation on his neck right arm numbness tingling first 3 fingers going on for awhile no known injury and not medically treated for this issue.  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 Symptoms typically wake him from sleep. He mentioned that his lifestyle is quite active as he enjoys playing golf and swimming. He has been recommended to start Xarelto by his cardiologist. He also mentioned difficulty with his wife who has newly diagnosed dementia. - Chief Complaint (CC) : Numbness and discomfort in fingers of right hand. - History of Present Illness (HPI) : Patient has been dealing with this issue for a couple of years. The discomfort and numbness was not persistent initially and would go away after waking up and doing exercises. Recently, symptoms have not been subsiding and have caused disturbed sleep. [de-identified] : sleeping

## 2024-07-31 NOTE — PHYSICAL EXAM
[Stooped] : stooped [UE/LE] : Sensory: Intact in bilateral upper & lower extremities [Bicep] : biceps 2+ and symmetric bilaterally [B.R.] : biceps 2+ and symmetric bilaterally [Knee] : patellar 2+ and symmetric bilaterally [Ankle] : ankle 2+ and symmetric bilaterally [DP] : dorsalis pedis 2+ and symmetric bilaterally [SLR] : negative straight leg raise [de-identified] : Well-healed midline lumbar incision.  No significant midline or paraspinal lumbar tenderness. No rashes or ecchymotic lesions over the trunk or lower extremities.  Normal extremity edema. No midline cervical or paraspinal cervical tenderness.  Minimal cervical spasm [de-identified] : Range of motion cervical spine is restricted in range of motion with forward flexion of 40 degrees extension. Left and right lateral rotation of 30 degrees.  Stooped forward posture noted. + Tinels and active compression R Wrist [de-identified] : 4 views cervical spine demonstrate no significant scoliosis.  Normal cervical lordosis.  Degenerative changes noted at C3-4 retrolisthesis.  Degenerative changes C5-6 with retrolisthesis.  Degeneration also noted at C6-7 with loss of disc height.  No dynamic instability between flexion-extension.  No acute fractures.

## 2024-08-07 ENCOUNTER — APPOINTMENT (OUTPATIENT)
Dept: CARDIOLOGY | Facility: CLINIC | Age: 84
End: 2024-08-07

## 2024-08-07 ENCOUNTER — NON-APPOINTMENT (OUTPATIENT)
Age: 84
End: 2024-08-07

## 2024-08-07 PROBLEM — I48.0 AF (PAROXYSMAL ATRIAL FIBRILLATION): Status: ACTIVE | Noted: 2024-07-21

## 2024-08-07 PROCEDURE — 99215 OFFICE O/P EST HI 40 MIN: CPT

## 2024-08-07 PROCEDURE — 93000 ELECTROCARDIOGRAM COMPLETE: CPT

## 2024-08-07 PROCEDURE — G2211 COMPLEX E/M VISIT ADD ON: CPT

## 2024-08-07 NOTE — DISCUSSION/SUMMARY
[EKG obtained to assist in diagnosis and management of assessed problem(s)] : EKG obtained to assist in diagnosis and management of assessed problem(s) [TextEntry] : Hypertension-well-controlled Hypercholesterolemia there is laboratory data to be drawn Paroxysmal atrial fibrillation-EKG shows normal sinus rhythm with frequent ventricular ectopy today.  Continue to monitor Return visit 6 months   Total time of the encounter: 45 minutes which included but was not limited to the following: Face-to-face and non face-to-face time personally spent by the physician preparing to see the patient, obtaining and/or resuming separately obtained history, performing a medically appropriate examination and/or evaluation, counseling and educating the patient/family/caregiver, ordering medications, tests or procedures, referring and communicating with other healthcare professionals, documenting clinical information in the electronic health record, independently interpreting results and communicated results to the patient/family/caregiver and care coordination.

## 2024-08-07 NOTE — PHYSICAL EXAM
[TextEntry] : General/Constitutional: WD/ WN in NAD H: NC/AT Eyes:  PERRL, sclerae and conjunctivae normal without jaundice or xanthelasma; ENMT:normal teeth, gums and palate with no petechiae, pallor or cyanosis Neck: w/o JVD, thyromegaly or adenopathy; normal venous contour Respiratory: clear to auscultation, normal respiratory effort with no retractions or use of accessory respiratory muscles Heart: [id number], regularly irregular rhythm (ventricular bigeminy on EKG), normal S1, S2 with a 2/6 midsystolic blowing murmur at the mitral area but no rubs, gallops, heaves or thrills Vascular exam: normal carotid upstrokes without carotid or abdominal bruits. 2+/2+ pulses to posterior tibialis and dorsalis pedis Abdomen: soft, nontender, bowels sounds normal without hepatomegaly or splenomegaly, masses or bruits Musculoskeletal:without significant kyphosis or scoliosis Extremities: w/o CCE, good capillary filling Skin: no stasis changes; no ulcers  Neuro: AA and O x 3; no focal neurologic deficits Psych: normal mood and affect

## 2024-08-07 NOTE — REASON FOR VISIT
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 SEMAJ Vasquez's long term.  The patient has a history of hypertension, paroxysmal atrial fibrillation, nonsustained VT, hypertension, hypercholesterolemia, mitral valve prolapse and spinal stenosis.  The patient is status post surgery for lumbar spine disease and is able to play golf with no real difficulties.  He also has some right shoulder issues.  When he sleeps at night he often wakes up with numbness in his fingers consistent with carpal tunnel syndrome.  He did see an orthopedist recently recommended a wrist brace which has been somewhat effective.  We also discussed the use of vitamin B6 50 mcg daily.  On a recent Holter monitor, the patient was noted to have long episodes of atrial fibrillation.  Although they are asymptomatic, the patient will started on Xarelto for thromboembolic protection.  He also had multiple nonsustained runs of ventricular tachycardia and occasional long pauses usually at night.  All of these are asymptomatic.  Will continue to monitor.  Except for some arthritic issues, the patient is doing well.

## 2024-08-12 ENCOUNTER — NON-APPOINTMENT (OUTPATIENT)
Age: 84
End: 2024-08-12

## 2024-08-15 ENCOUNTER — NON-APPOINTMENT (OUTPATIENT)
Age: 84
End: 2024-08-15

## 2024-08-19 ENCOUNTER — APPOINTMENT (OUTPATIENT)
Dept: INTERNAL MEDICINE | Facility: CLINIC | Age: 84
End: 2024-08-19

## 2024-08-19 VITALS
TEMPERATURE: 98.3 F | HEART RATE: 65 BPM | OXYGEN SATURATION: 97 % | DIASTOLIC BLOOD PRESSURE: 84 MMHG | BODY MASS INDEX: 23.63 KG/M2 | WEIGHT: 160 LBS | SYSTOLIC BLOOD PRESSURE: 132 MMHG

## 2024-08-19 DIAGNOSIS — I77.810 THORACIC AORTIC ECTASIA: ICD-10-CM

## 2024-08-19 DIAGNOSIS — C43.9 MALIGNANT MELANOMA OF SKIN, UNSPECIFIED: ICD-10-CM

## 2024-08-19 DIAGNOSIS — I47.29 OTHER VENTRICULAR TACHYCARDIA: ICD-10-CM

## 2024-08-19 DIAGNOSIS — I47.19 OTHER SUPRAVENTRICULAR TACHYCARDIA: ICD-10-CM

## 2024-08-19 DIAGNOSIS — I48.0 PAROXYSMAL ATRIAL FIBRILLATION: ICD-10-CM

## 2024-08-19 DIAGNOSIS — I10 ESSENTIAL (PRIMARY) HYPERTENSION: ICD-10-CM

## 2024-08-19 PROCEDURE — 99215 OFFICE O/P EST HI 40 MIN: CPT

## 2024-08-19 PROCEDURE — G2211 COMPLEX E/M VISIT ADD ON: CPT

## 2024-08-31 PROBLEM — I47.19 ATRIAL TACHYCARDIA: Status: ACTIVE | Noted: 2022-05-23

## 2024-08-31 NOTE — HEALTH RISK ASSESSMENT
[Good] : ~his/her~  mood as  good [No] : In the past 12 months have you used drugs other than those required for medical reasons? No [No falls in past year] : Patient reported no falls in the past year [0] : 2) Feeling down, depressed, or hopeless: Not at all (0) [PHQ-2 Negative - No further assessment needed] : PHQ-2 Negative - No further assessment needed [With Patient/Caregiver] : , with patient/caregiver [Reviewed updated] : Reviewed, updated [Designated Healthcare Proxy] : Designated healthcare proxy [Name: ___] : Health Care Proxy's Name: [unfilled]  [Relationship: ___] : Relationship: [unfilled] [I will adhere to the patient's wishes.] : I will adhere to the patient's wishes. [Time Spent: ___ minutes] : Time Spent: [unfilled] minutes [Never] : Never [Patient declined colonoscopy] : Patient declined colonoscopy [HIV test declined] : HIV test declined [Hepatitis C test declined] : Hepatitis C test declined [With Family] : lives with family [Retired] : retired [Graduate School] : graduate school [] :  [# Of Children ___] : has [unfilled] children [Fully functional (bathing, dressing, toileting, transferring, walking, feeding)] : Fully functional (bathing, dressing, toileting, transferring, walking, feeding) [Fully functional (using the telephone, shopping, preparing meals, housekeeping, doing laundry, using] : Fully functional and needs no help or supervision to perform IADLs (using the telephone, shopping, preparing meals, housekeeping, doing laundry, using transportation, managing medications and managing finances) [de-identified] : active - golf, gym  [de-identified] : balanced varied diet without restrictions  [[id number]] : 0 [AdvancecareDate] : 12/21/2023 [High Risk Behavior] : no high risk behavior [Reports changes in hearing] : Reports no changes in hearing [Reports changes in vision] : Reports no changes in vision [ColonoscopyComments] : no further  [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

## 2024-08-31 NOTE — HISTORY OF PRESENT ILLNESS
[FreeTextEntry1] : Patient presents today for follow-up of chronic medical conditions.  [de-identified] : PMH - HTN, HLD, GERD, lumbar stenosis  Recent URI  Frustrated by patient's wife declining memory Has been causing significant stress on patient  Started Xarelto for paroxysmal afib   8/2024 -  Patient with ongoing lingering cough for the last several weeks Patient was treated with course of abx by UC  [place] better initially- but the cough remains  Denies fever or systemic sx - otherwise feels well   Dr. Milan Vasquez - Cardiologist  Has Urologist

## 2024-08-31 NOTE — HISTORY OF PRESENT ILLNESS
[FreeTextEntry1] : Patient presents today for follow-up of chronic medical conditions.  [de-identified] : PMH - HTN, HLD, GERD, lumbar stenosis  Recent URI  Frustrated by patient's wife declining memory Has been causing significant stress on patient  Started Xarelto for paroxysmal afib   8/2024 -  Patient with ongoing lingering cough for the last several weeks Patient was treated with course of abx by UC  [place] better initially- but the cough remains  Denies fever or systemic sx - otherwise feels well   Dr. Milan Vasquez - Cardiologist  Has Urologist

## 2024-08-31 NOTE — PLAN
[FreeTextEntry1] : #elevated PSA  [place] - Urologist  [ ] will follow-up with urology regarding elevated PSA  #HTN Patient with history of hypertension.  BP today acceptable Patient to continue current regimen as prescribed. Patient instructed to continue monitoring blood pressure at home and to keep a journal. Will continue to monitor patient's bp and adjust his hypertensive regimen as needed.   [ ] will obtain recommendations for social support as spouse of patient with dementia

## 2024-08-31 NOTE — HEALTH RISK ASSESSMENT
[Good] : ~his/her~  mood as  good [No] : In the past 12 months have you used drugs other than those required for medical reasons? No [No falls in past year] : Patient reported no falls in the past year [0] : 2) Feeling down, depressed, or hopeless: Not at all (0) [PHQ-2 Negative - No further assessment needed] : PHQ-2 Negative - No further assessment needed [With Patient/Caregiver] : , with patient/caregiver [Reviewed updated] : Reviewed, updated [Designated Healthcare Proxy] : Designated healthcare proxy [Name: ___] : Health Care Proxy's Name: [unfilled]  [Relationship: ___] : Relationship: [unfilled] [I will adhere to the patient's wishes.] : I will adhere to the patient's wishes. [Time Spent: ___ minutes] : Time Spent: [unfilled] minutes [Never] : Never [Patient declined colonoscopy] : Patient declined colonoscopy [HIV test declined] : HIV test declined [Hepatitis C test declined] : Hepatitis C test declined [With Family] : lives with family [Retired] : retired [Graduate School] : graduate school [] :  [# Of Children ___] : has [unfilled] children [Fully functional (bathing, dressing, toileting, transferring, walking, feeding)] : Fully functional (bathing, dressing, toileting, transferring, walking, feeding) [Fully functional (using the telephone, shopping, preparing meals, housekeeping, doing laundry, using] : Fully functional and needs no help or supervision to perform IADLs (using the telephone, shopping, preparing meals, housekeeping, doing laundry, using transportation, managing medications and managing finances) [de-identified] : active - golf, gym  [de-identified] : balanced varied diet without restrictions  [[id number]] : 0 [AdvancecareDate] : 12/21/2023 [High Risk Behavior] : no high risk behavior [Reports changes in hearing] : Reports no changes in hearing [Reports changes in vision] : Reports no changes in vision [ColonoscopyComments] : no further  [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

## 2024-09-16 ENCOUNTER — APPOINTMENT (OUTPATIENT)
Dept: CARDIOLOGY | Facility: CLINIC | Age: 84
End: 2024-09-16

## 2024-09-18 ENCOUNTER — APPOINTMENT (OUTPATIENT)
Dept: MRI IMAGING | Facility: IMAGING CENTER | Age: 84
End: 2024-09-18
Payer: MEDICARE

## 2024-09-18 ENCOUNTER — OUTPATIENT (OUTPATIENT)
Dept: OUTPATIENT SERVICES | Facility: HOSPITAL | Age: 84
LOS: 1 days | End: 2024-09-18
Payer: MEDICARE

## 2024-09-18 DIAGNOSIS — I67.1 CEREBRAL ANEURYSM, NONRUPTURED: ICD-10-CM

## 2024-09-18 DIAGNOSIS — Z90.89 ACQUIRED ABSENCE OF OTHER ORGANS: Chronic | ICD-10-CM

## 2024-09-18 DIAGNOSIS — Z96.643 PRESENCE OF ARTIFICIAL HIP JOINT, BILATERAL: Chronic | ICD-10-CM

## 2024-09-18 DIAGNOSIS — Z90.49 ACQUIRED ABSENCE OF OTHER SPECIFIED PARTS OF DIGESTIVE TRACT: Chronic | ICD-10-CM

## 2024-09-18 DIAGNOSIS — Z98.41 CATARACT EXTRACTION STATUS, RIGHT EYE: Chronic | ICD-10-CM

## 2024-09-18 PROCEDURE — 70544 MR ANGIOGRAPHY HEAD W/O DYE: CPT

## 2024-09-18 PROCEDURE — 70544 MR ANGIOGRAPHY HEAD W/O DYE: CPT | Mod: 26,MH

## 2024-09-25 ENCOUNTER — APPOINTMENT (OUTPATIENT)
Dept: NEUROSURGERY | Facility: CLINIC | Age: 84
End: 2024-09-25
Payer: MEDICARE

## 2024-09-25 ENCOUNTER — NON-APPOINTMENT (OUTPATIENT)
Age: 84
End: 2024-09-25

## 2024-09-25 VITALS
WEIGHT: 160 LBS | OXYGEN SATURATION: 94 % | HEART RATE: 57 BPM | HEIGHT: 69 IN | SYSTOLIC BLOOD PRESSURE: 117 MMHG | BODY MASS INDEX: 23.7 KG/M2 | DIASTOLIC BLOOD PRESSURE: 74 MMHG

## 2024-09-25 DIAGNOSIS — I67.1 CEREBRAL ANEURYSM, NONRUPTURED: ICD-10-CM

## 2024-09-25 PROCEDURE — 99215 OFFICE O/P EST HI 40 MIN: CPT

## 2024-09-25 NOTE — HISTORY OF PRESENT ILLNESS
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 He presented to [facility] ED on 3/8/23 with sudden onset of lightheadedness, diaphoresis, mild SOB. ECG showed sinus arrhythmia, 1st degree block. Differential includes peripheral vertigo vs. ACS vs. posterior stroke. Further work-up with CTA neck revealed incidental tiny 1-2mm outpouching arising from the supraclinoid segment of the right internal carotid artery, likely representing an infundibulum of a tiny Right PCOMM artery, or aneurysm. He is a retired physician for 8 years.  Cardiologist: Dr. Milan Vasquez  Today, patient presents for follow up to review results of repeat MRA brain obtained on 9/18/24. Since our last visit, he has no new complaints. Denies symptoms of motor, sensory, speech, or visual abnormalities. In the last year or 2, developed carpal tunnel syndrome. Follows ortho Dr. Dakotah Guevara.

## 2024-09-25 NOTE — REASON FOR VISIT
[Follow-Up: _____] : a [unfilled] follow-up visit [FreeTextEntry1] : Reviewed MRA brain non contrast done 9/18/24

## 2024-09-25 NOTE — HISTORY OF PRESENT ILLNESS
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 He presented to [facility] ED on 3/8/23 with sudden onset of lightheadedness, diaphoresis, mild SOB. ECG showed sinus arrhythmia, 1st degree block. Differential includes peripheral vertigo vs. ACS vs. posterior stroke. Further work-up with CTA neck revealed incidental tiny 1-2mm outpouching arising from the supraclinoid segment of the right internal carotid artery, likely representing an infundibulum of a tiny Right PCOMM artery, or aneurysm. He is a retired physician for 8 years.  Cardiologist: Dr. Milan Vasquez  Today, patient presents for follow up to review results of repeat MRA brain obtained on 9/18/24. Since our last visit, he has no new complaints. Denies symptoms of motor, sensory, speech, or visual abnormalities. In the last year or 2, developed carpal tunnel syndrome. Follows ortho Dr. Dakotah Guevara.

## 2024-09-25 NOTE — ASSESSMENT
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 The small deformity identified on CT angiography 3/8/2023 along the undersurface of the distal clinoid segment of the right internal carotid artery is not convincingly demonstrated by this technique.  Repeat MRA brain 9/18/24 shows unchanged 2mm right supraclinoid ICA infundibulum vs. aneurysm. Mild to moderate atherosclerosis of the bilateral cavernous and clinoid segments of the ICAs. Will continue to monitor.   On his annual follow up, he continues to do well. Denies symptoms of motor, sensory, speech, or visual abnormalities. Follows ortho specialist Dr. Dakotah Guevara for carpal tunnel.     PLAN: No neurosurgical contraindication to Xarelto for Afib per cardiology Repeat MRA brain non con in 1 year - September 2025 Follow up after for review Continue follow up with ortho Dr. Dakotah Guevara for management of carpal tunnel

## 2024-10-07 ENCOUNTER — APPOINTMENT (OUTPATIENT)
Dept: ORTHOPEDIC SURGERY | Facility: CLINIC | Age: 84
End: 2024-10-07
Payer: MEDICARE

## 2024-10-07 DIAGNOSIS — M25.569 PAIN IN UNSPECIFIED KNEE: ICD-10-CM

## 2024-10-07 PROCEDURE — 73564 X-RAY EXAM KNEE 4 OR MORE: CPT | Mod: LT

## 2024-10-07 PROCEDURE — 99214 OFFICE O/P EST MOD 30 MIN: CPT

## 2024-10-17 ENCOUNTER — APPOINTMENT (OUTPATIENT)
Dept: ORTHOPEDIC SURGERY | Facility: CLINIC | Age: 84
End: 2024-10-17
Payer: MEDICARE

## 2024-10-17 DIAGNOSIS — M70.40 PREPATELLAR BURSITIS, UNSPECIFIED KNEE: ICD-10-CM

## 2024-10-17 DIAGNOSIS — S80.02XA CONTUSION OF LEFT KNEE, INITIAL ENCOUNTER: ICD-10-CM

## 2024-10-17 PROCEDURE — 99214 OFFICE O/P EST MOD 30 MIN: CPT

## 2024-10-22 ENCOUNTER — OUTPATIENT (OUTPATIENT)
Dept: OUTPATIENT SERVICES | Facility: HOSPITAL | Age: 84
LOS: 1 days | End: 2024-10-22
Payer: MEDICARE

## 2024-10-22 ENCOUNTER — APPOINTMENT (OUTPATIENT)
Dept: MRI IMAGING | Facility: IMAGING CENTER | Age: 84
End: 2024-10-22
Payer: MEDICARE

## 2024-10-22 DIAGNOSIS — Z90.89 ACQUIRED ABSENCE OF OTHER ORGANS: Chronic | ICD-10-CM

## 2024-10-22 DIAGNOSIS — Z90.49 ACQUIRED ABSENCE OF OTHER SPECIFIED PARTS OF DIGESTIVE TRACT: Chronic | ICD-10-CM

## 2024-10-22 DIAGNOSIS — S80.02XA CONTUSION OF LEFT KNEE, INITIAL ENCOUNTER: ICD-10-CM

## 2024-10-22 DIAGNOSIS — Z96.643 PRESENCE OF ARTIFICIAL HIP JOINT, BILATERAL: Chronic | ICD-10-CM

## 2024-10-22 DIAGNOSIS — Z98.41 CATARACT EXTRACTION STATUS, RIGHT EYE: Chronic | ICD-10-CM

## 2024-10-22 PROCEDURE — 73721 MRI JNT OF LWR EXTRE W/O DYE: CPT | Mod: 26,LT,MH

## 2024-10-28 ENCOUNTER — APPOINTMENT (OUTPATIENT)
Dept: ORTHOPEDIC SURGERY | Facility: CLINIC | Age: 84
End: 2024-10-28
Payer: MEDICARE

## 2024-10-28 DIAGNOSIS — S80.02XA CONTUSION OF LEFT KNEE, INITIAL ENCOUNTER: ICD-10-CM

## 2024-10-28 PROCEDURE — 99213 OFFICE O/P EST LOW 20 MIN: CPT

## 2024-11-20 PROCEDURE — 73721 MRI JNT OF LWR EXTRE W/O DYE: CPT

## 2024-11-22 ENCOUNTER — EMERGENCY (EMERGENCY)
Facility: HOSPITAL | Age: 84
LOS: 1 days | Discharge: HOME | End: 2024-11-22
Attending: EMERGENCY MEDICINE
Payer: MEDICARE

## 2024-11-22 VITALS
HEART RATE: 76 BPM | SYSTOLIC BLOOD PRESSURE: 150 MMHG | DIASTOLIC BLOOD PRESSURE: 82 MMHG | HEIGHT: 69 IN | RESPIRATION RATE: 17 BRPM | WEIGHT: 160.06 LBS | OXYGEN SATURATION: 96 % | TEMPERATURE: 98 F

## 2024-11-22 VITALS
TEMPERATURE: 98 F | SYSTOLIC BLOOD PRESSURE: 132 MMHG | HEART RATE: 78 BPM | OXYGEN SATURATION: 99 % | RESPIRATION RATE: 17 BRPM | DIASTOLIC BLOOD PRESSURE: 77 MMHG

## 2024-11-22 DIAGNOSIS — Z90.89 ACQUIRED ABSENCE OF OTHER ORGANS: Chronic | ICD-10-CM

## 2024-11-22 DIAGNOSIS — Z98.41 CATARACT EXTRACTION STATUS, RIGHT EYE: Chronic | ICD-10-CM

## 2024-11-22 DIAGNOSIS — Z96.643 PRESENCE OF ARTIFICIAL HIP JOINT, BILATERAL: Chronic | ICD-10-CM

## 2024-11-22 DIAGNOSIS — R04.0 EPISTAXIS: ICD-10-CM

## 2024-11-22 DIAGNOSIS — Z90.49 ACQUIRED ABSENCE OF OTHER SPECIFIED PARTS OF DIGESTIVE TRACT: Chronic | ICD-10-CM

## 2024-11-22 PROCEDURE — 30903 CONTROL OF NOSEBLEED: CPT | Mod: LT

## 2024-11-22 PROCEDURE — 99284 EMERGENCY DEPT VISIT MOD MDM: CPT

## 2024-11-22 PROCEDURE — 99284 EMERGENCY DEPT VISIT MOD MDM: CPT | Mod: FS,25

## 2024-11-22 RX ORDER — OXYMETAZOLINE HCL 0.05 %
2 SPRAY, NON-AEROSOL (ML) NASAL ONCE
Refills: 0 | Status: COMPLETED | OUTPATIENT
Start: 2024-11-22 | End: 2024-11-22

## 2024-11-22 RX ORDER — TRANEXAMIC ACID 650 MG/1
5 TABLET ORAL ONCE
Refills: 0 | Status: COMPLETED | OUTPATIENT
Start: 2024-11-22 | End: 2024-11-22

## 2024-11-22 RX ORDER — POLYMYXIN B SULFATE, BACITRACIN ZINC, AND NEOMYCIN SULFATE 400; 3.5; 5 [IU]/G; MG/G; [IU]/G
1 OINTMENT TOPICAL
Qty: 1 | Refills: 0
Start: 2024-11-22 | End: 2024-11-28

## 2024-11-22 RX ADMIN — TRANEXAMIC ACID 5 MILLILITER(S): 650 TABLET ORAL at 11:24

## 2024-11-22 RX ADMIN — Medication 2 SPRAY(S): at 10:20

## 2024-11-22 NOTE — CONSULT NOTE ADULT - PROBLEM SELECTOR RECOMMENDATION 9
- continue with dissolvable nasopore if falls out and doesn't bleed okay   - Strict blood pressure control.  - Nasal saline, 2 sprays to both nares 4 times a day  - baci to b/l nare   - afrin for active bleed do not use more that 3 times  - Avoid nasal trauma; no nose rubbing, blowing or manipulating nasal packing.  Sneeze with mouth open and pinching nares.  - Avoid bending with head blow the waist.    -No heavy lifting  - Pt is to follow up at ENT in 2 weeks with Baron Rice Bruni, [place]. Call [phone] to make appointment

## 2024-11-22 NOTE — ED PROVIDER NOTE - NSFOLLOWUPINSTRUCTIONS_ED_ALL_ED_FT
Thank you for visiting our Emergency Department, it has been a pleasure taking part in your healthcare. Please follow up with your primary doctor within x48 hours.    Your discharge diagnosis is: epistaxix    Return precautions to the Emergency Department include but are not limited to: unrelenting nausea, vomiting, fever, chills, chest pain, shortness of breath, dizziness, abdominal pain, worsening pain, syncope, blood in urine or stool, headache that doesn't resolve, numbness or tingling, loss of sensation, loss of motor function, or any other concerning symptoms.     apply  Aquaphor or Vaseline ointment to nares to keep moist twice daily  Use humidifier in home    Follow up with ENT in the next week    do not swallow blood , spit out Thank you for visiting our Emergency Department, it has been a pleasure taking part in your healthcare. Please follow up with your primary doctor within x48 hours.    Your discharge diagnosis is: epistaxis    Return precautions to the Emergency Department include but are not limited to: unrelenting nausea, vomiting, fever, chills, chest pain, shortness of breath, dizziness, abdominal pain, worsening pain, syncope, blood in urine or stool, headache that doesn't resolve, numbness or tingling, loss of sensation, loss of motor function, or any other concerning symptoms.     apply  Aquaphor or Vaseline ointment to nares to keep moist twice daily  Use humidifier in home    Follow up with ENT in the next week    do not swallow blood , spit out Apply the bacitracin into the nasal passages both sides twice daily for 1 week.     Thank you for visiting our Emergency Department, it has been a pleasure taking part in your healthcare. Please follow up with your primary doctor within x48 hours.    Your discharge diagnosis is: epistaxis    Return precautions to the Emergency Department include but are not limited to: unrelenting nausea, vomiting, fever, chills, chest pain, shortness of breath, dizziness, abdominal pain, worsening pain, syncope, blood in urine or stool, headache that doesn't resolve, numbness or tingling, loss of sensation, loss of motor function, or any other concerning symptoms.     apply  Aquaphor or Vaseline ointment to nares to keep moist twice daily  Use humidifier in home    Follow up with ENT in the next week    do not swallow blood , spit out

## 2024-11-22 NOTE — ED PROVIDER NOTE - PATIENT PORTAL LINK FT
You can access the FollowMyHealth Patient Portal offered by [facility] by registering at the following website: [email or web address]. By joining [employer]’s FollowMyHealth portal, you will also be able to view your health information using other applications (apps) compatible with our system. You can access the FollowMyHealth Patient Portal offered by [facility] by registering at the following website: [email or web address]. By joining [employer]’s FollowMyHealth portal, you will also be able to view your health information using other applications (apps) compatible with our system.

## 2024-11-22 NOTE — ED PROVIDER NOTE - CARE PROVIDER_API CALL
Neo Celsi.  Otolaryngology  [address] [place] [address], [address]  [place] [address]  Phone: [phone]  Fax: [phone]  Follow Up Time:

## 2024-11-22 NOTE — CONSULT NOTE ADULT - SUBJECTIVE AND OBJECTIVE BOX
CC: L epistaxis     HPI:   85 yo male in blue 33R, accompanied by wife  presents to the ER for evaluation of epistaxis.  PT awake alert oriented x3  states " I take Xarelto for AFIB and last night I started ot have a nose bleed..  I have had a little cold for the past few days and my nose got irritated.  I packed my nose this morning but it appears to still be bleeding a bit.   I do not feel dizzy at this time. 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      History of appendectomy  1943      History of cataract extraction, right  2012      History of tonsillectomy  1948        Allergies    No Known Allergies    Intolerances      MEDICATIONS  (STANDING):    MEDICATIONS  (PRN):      Social History: no tobacco, no etoh     Family history: Pt denies any sign FHx    ROS:   ENT: all negative except as noted in HPI   CV: denies palpitations  Pulm: denies SOB, cough, hemoptysis  GI: denies change in apetite, indigestion, n/v  : denies pertinent urinary symptoms, urgency  Neuro: denies numbness/tingling, loss of sensation  Psych: denies anxiety  MS: denies muscle weakness, instability  Heme: denies easy bruising or bleeding  Endo: denies heat/cold intolerance, excessive sweating  Vascular: denies LE edema    Vital Signs Last 24 Hrs  T(C): 36.4 (22 Nov 2024 14:01), Max: 36.5 (22 Nov 2024 12:47)  T(F): 97.6 (22 Nov 2024 14:01), Max: 97.7 (22 Nov 2024 12:47)  HR: 78 (22 Nov 2024 14:01) (60 - 78)  BP: 132/77 (22 Nov 2024 14:01) (132/77 - 150/82)  BP(mean): --  RR: 17 (22 Nov 2024 14:01) (17 - 19)  SpO2: 99% (22 Nov 2024 14:01) (95% - 99%)    Parameters below as of 22 Nov 2024 14:01  Patient On (Oxygen Delivery Method): room air                  PHYSICAL EXAM:  Gen: NAD  Skin: No rashes, bruises, or lesions  Head: Normocephalic, Atraumatic  Face: no edema, erythema, or fluctuance. Parotid glands soft without mass  Eyes: no scleral injection  Nose: Nares bilaterally patent, no discharge, Left nasal septum with small area of excoriation- cauterized and packed with dissolvable nasopore and baci  Mouth: No Stridor / Drooling / Trismus.  Mucosa moist, tongue/uvula midline, oropharynx clear  Neck: Flat, supple, no lymphadenopathy, trachea midline, no masses  Lymphatic: No lymphadenopathy  Resp: breathing easily, no stridor  CV: no peripheral edema/cyanosis  GI: nondistended   Peripheral vascular: no JVD or edema  Neuro: facial nerve intact, no facial droop

## 2024-11-22 NOTE — ED PROVIDER NOTE - PROGRESS NOTE DETAILS
Salvador Atkins NP-C - TXA placed to  Pt's left nares  packed .  Will observe Salvador Atkins NP-C - Pt with no EPistaxis at this time.   ok to dc home +Recurrent bleeding now and ENT consulted.

## 2024-11-22 NOTE — ED ADULT NURSE NOTE - OBJECTIVE STATEMENT
84 yr old male ambulatory from triage with wife c/o epistaxis x few days, +xarelto, currently nasal packing in place, no active bleed noted, denies trauma

## 2024-11-22 NOTE — ED PROVIDER NOTE - OBJECTIVE STATEMENT
85 yo male in blue 33R, accompanied by wife  presents to the ER for evaluation of epistaxis.  PT awake alert oriented x3  states " I take ELIQUIS for AFIB and last night I started ot have a nose bleed..  I have had a little cold for the past few days and my nose got irritated.  I packed my nose this morning but it appears to still be bleeding a bit.   I do not feel dizzy at this time. 85 yo male in blue 33R, accompanied by wife  presents to the ER for evaluation of epistaxis.  PT awake alert oriented x3  states " I take Xarelto for AFIB and last night I started ot have a nose bleed..  I have had a little cold for the past few days and my nose got irritated.  I packed my nose this morning but it appears to still be bleeding a bit.   I do not feel dizzy at this time.

## 2024-11-22 NOTE — ED PROVIDER NOTE - CLINICAL SUMMARY MEDICAL DECISION MAKING FREE TEXT BOX
attending joselo - FRANKY on eliquis pw epistaxis from left nare. no trauma. notes recent viral infection. txa and afrin utilized with substantial effect but bleeding continued so ent consulted.

## 2024-11-22 NOTE — ED PROVIDER NOTE - AVIAN FLU EXPOSURE
PT: pt refused PT due to pt being very fatigued from OT and nursing cares but wanted to reschedule for tomorrow. Will Reschedule.   No

## 2024-11-22 NOTE — CONSULT NOTE ADULT - ASSESSMENT
83 yo male in blue 33R, accompanied by wife  presents to the ER for evaluation of epistaxis.  PT awake alert oriented x3  states " I take Xarelto for AFIB and last night I started ot have a nose bleed..  I have had a little cold for the past few days and my nose got irritated.  I packed my nose this morning but it appears to still be bleeding a bit.   I do not feel dizzy at this time. Ed attempted to control with afrin soaked guaze and txa however ooze persisted. Nares bilaterally patent, no discharge or active bleed- Left nasal septum with small area of excoriation- cauterized and packed with dissolvable nasopore and baci

## 2024-11-24 ENCOUNTER — EMERGENCY (EMERGENCY)
Facility: HOSPITAL | Age: 84
LOS: 1 days | End: 2024-11-24
Payer: MEDICARE

## 2024-11-24 VITALS
HEIGHT: 69 IN | RESPIRATION RATE: 18 BRPM | DIASTOLIC BLOOD PRESSURE: 88 MMHG | WEIGHT: 160.06 LBS | HEART RATE: 59 BPM | OXYGEN SATURATION: 95 % | SYSTOLIC BLOOD PRESSURE: 143 MMHG | TEMPERATURE: 98 F

## 2024-11-24 VITALS
TEMPERATURE: 98 F | SYSTOLIC BLOOD PRESSURE: 129 MMHG | OXYGEN SATURATION: 98 % | HEART RATE: 85 BPM | DIASTOLIC BLOOD PRESSURE: 77 MMHG | RESPIRATION RATE: 17 BRPM

## 2024-11-24 DIAGNOSIS — Z90.49 ACQUIRED ABSENCE OF OTHER SPECIFIED PARTS OF DIGESTIVE TRACT: Chronic | ICD-10-CM

## 2024-11-24 DIAGNOSIS — Z96.643 PRESENCE OF ARTIFICIAL HIP JOINT, BILATERAL: Chronic | ICD-10-CM

## 2024-11-24 DIAGNOSIS — Z98.41 CATARACT EXTRACTION STATUS, RIGHT EYE: Chronic | ICD-10-CM

## 2024-11-24 DIAGNOSIS — Z90.89 ACQUIRED ABSENCE OF OTHER ORGANS: Chronic | ICD-10-CM

## 2024-11-24 LAB
ANION GAP SERPL CALC-SCNC: 13 MMOL/L — SIGNIFICANT CHANGE UP (ref 5–17)
BUN SERPL-MCNC: 24 MG/DL — HIGH (ref 7–23)
CALCIUM SERPL-MCNC: 9.7 MG/DL — SIGNIFICANT CHANGE UP (ref 8.4–10.5)
CHLORIDE SERPL-SCNC: 103 MMOL/L — SIGNIFICANT CHANGE UP (ref 96–108)
CO2 SERPL-SCNC: 22 MMOL/L — SIGNIFICANT CHANGE UP (ref 22–31)
CREAT SERPL-MCNC: 0.95 MG/DL — SIGNIFICANT CHANGE UP (ref 0.5–1.3)
EGFR: 79 ML/MIN/1.73M2 — SIGNIFICANT CHANGE UP
GLUCOSE SERPL-MCNC: 112 MG/DL — HIGH (ref 70–99)
HCT VFR BLD CALC: 35.2 % — LOW (ref 39–50)
HGB BLD-MCNC: 11.2 G/DL — LOW (ref 13–17)
MCHC RBC-ENTMCNC: 28.5 PG — SIGNIFICANT CHANGE UP (ref 27–34)
MCHC RBC-ENTMCNC: 31.8 G/DL — LOW (ref 32–36)
MCV RBC AUTO: 89.6 FL — SIGNIFICANT CHANGE UP (ref 80–100)
NRBC # BLD: 0 /100 WBCS — SIGNIFICANT CHANGE UP (ref 0–0)
PLATELET # BLD AUTO: 186 K/UL — SIGNIFICANT CHANGE UP (ref 150–400)
POTASSIUM SERPL-MCNC: 4.5 MMOL/L — SIGNIFICANT CHANGE UP (ref 3.5–5.3)
POTASSIUM SERPL-SCNC: 4.5 MMOL/L — SIGNIFICANT CHANGE UP (ref 3.5–5.3)
RBC # BLD: 3.93 M/UL — LOW (ref 4.2–5.8)
RBC # FLD: 14.2 % — SIGNIFICANT CHANGE UP (ref 10.3–14.5)
SODIUM SERPL-SCNC: 138 MMOL/L — SIGNIFICANT CHANGE UP (ref 135–145)
WBC # BLD: 7.86 K/UL — SIGNIFICANT CHANGE UP (ref 3.8–10.5)
WBC # FLD AUTO: 7.86 K/UL — SIGNIFICANT CHANGE UP (ref 3.8–10.5)

## 2024-11-24 PROCEDURE — 99283 EMERGENCY DEPT VISIT LOW MDM: CPT | Mod: GC

## 2024-11-24 PROCEDURE — 99283 EMERGENCY DEPT VISIT LOW MDM: CPT

## 2024-11-24 PROCEDURE — 36415 COLL VENOUS BLD VENIPUNCTURE: CPT

## 2024-11-24 PROCEDURE — 80048 BASIC METABOLIC PNL TOTAL CA: CPT

## 2024-11-24 PROCEDURE — 85027 COMPLETE CBC AUTOMATED: CPT

## 2024-11-24 NOTE — ED ADULT NURSE NOTE - OBJECTIVE STATEMENT
(Break coverage RN covering for primary RN) Pt is a 84y male who presents to [facility] ED from triage c/o nose bleed. Pt endorses on Thursday his L nostril began bleeding, denies any trauma to the area, came to the ER on Friday for eval, was seen by ENT in the ER who used silver nitrate and nasal packed pt nose, pt was discharged home, states the bleeding never fully stopped, had nasal packing removed yesterday and last night close to midnight pt L nostril started bleeding again, states he is changing his tissue every 20-30 minutes. PMH Afib(on Xarelto), HTN. Pt is A&Ox4 speaking in full coherent sentences, breathing is spontaneous and unlabored currently denying any HA, dizziness, SOB, CP, palpitations, abd pain, n/v, fever/chills, weakness/fatigue. Pt ambulates independently at baseline.

## 2024-11-24 NOTE — ED ADULT TRIAGE NOTE - CHIEF COMPLAINT QUOTE
epistaxis to L nostril, on xarelto  pt seen in ED Friday and DCd with nasal packing, reports packing was removed yesterday, onset of bleeding soon after  pt has gauze in L nostril on arrival epistaxis to L nostril, on xarelto  pt seen in ED Friday for epistaxis and DCd with nasal packing, reports packing was removed yesterday, onset of bleeding soon after  pt has gauze in L nostril on arrival

## 2024-11-24 NOTE — ED PROVIDER NOTE - OBJECTIVE STATEMENT
84-year-old Male with a history of afib on Xarelto and HTN, who presents with epistaxis from left nare for the past 3 days. Was evaluated in the ED 2 days ago where it was cauterized. The patient now returns to the ED due to persistent bleeding since last night, where he changed his tissue packing every 30 minutes. Patient states he is still taking Xarelto. Denies lightheadedness, dizziness, nausea, vomiting, chest pain, shortness of breath.

## 2024-11-24 NOTE — ED PROVIDER NOTE - ATTENDING CONTRIBUTION TO CARE
Emergency Medicine Attending MD Simons:  patient seen and evaluated with the resident.  I was present for key portions of the History & Physical, and I agree with the Impression & Plan.    Patient is a 84-year-old male, complaining of left naris epistaxis; second ED visit; was in the ED 2 days ago.  + On Xarelto for afib.     VS: wnl.   Gen:  Well appearing in NAD.    ENT:  L naris dried clot on the   Pharynx:    Head:  NC/AT.    Resp: No distress.    Ext: no deformities.    Skin: warm and dry as visualized.    Neuro: alert and oriented to person, place, time.  Psych: appropriate    Medical Decision Making / Differential Diagnosis:  Plan: active bleeding will require direct pressure, phenylephrine, +/-  packing, +/- TXA,   Hemoglobin check, INR. Emergency Medicine Attending MD Simons:  patient seen and evaluated with the resident.  I was present for key portions of the History & Physical, and I agree with the Impression & Plan.    Patient is a 84-year-old male, complaining of left naris epistaxis; second ED visit; was in the ED 2 days ago.  + On Xarelto for afib (started 4 mos ago)    VS: wnl.   Gen:  Well appearing in NAD.    ENT:  L naris with dried clot on the septum  Pharynx:    Head:  NC/AT.    Resp: No distress.    Ext: no deformities.    Skin: warm and dry as visualized.    Neuro: alert and oriented to person, place, time.  Psych: appropriate    Medical Decision Making / Differential Diagnosis:  Plan: active bleeding will require direct pressure, Surgicel to septum, reassess.   Hemoglobin check

## 2024-11-24 NOTE — ED PROVIDER NOTE - CLINICAL SUMMARY MEDICAL DECISION MAKING FREE TEXT BOX
84-year-old Male with a history of afib on Xarelto and HTN, who presents with epistaxis for the past 3 days. Evaluated in the ED 2 days ago where bleeding cauterized. The patient returns to the ED due to bleeding that restarted last night. Currently still taking on Xarelto. No active bleeding on exam. Will draw labs and observe the patient. 84-year-old Male with a history of afib on Xarelto and HTN, who presents with epistaxis for the past 3 days. Evaluated in the ED 2 days ago where bleeding cauterized. The patient returns to the ED due to bleeding that restarted last night. Currently still taking on Xarelto. No active bleeding on exam. Will draw labs and observe the patient for bleeding.

## 2024-11-24 NOTE — ED PROVIDER NOTE - PHYSICAL EXAMINATION
INITIAL VITAL SIGNS: Reviewed by me.  GENERAL: In no acute distress.  HEAD: Normocephalic. Atraumatic.  EYES: EOMI. PERRL.  ENT: Moist mucous membranes. (+) Septal ulcer to left nare. No active bleeding. No post nasal drip.   NECK: Supple. No meningismus.   CV: Regular rate and rhythm. No murmurs, rubs, or gallops.  RESPIRATORY: Unlabored respirations. Clear to ascultation bilaterally.  EXTREMITIES: No deformities. No edema.   SKIN: Warm and dry. No rashes or petechiae.  NEURO: Grossly non-focal.

## 2024-11-24 NOTE — ED PROVIDER NOTE - PROGRESS NOTE DETAILS
Labs showed no leukocytosis or severe anemia. No evidence of severe electrolyte abnormalities or renal disorder.    Surgicel applied to left septal ulcer. Tolerated well. Will continue to observe for bleeding. On reassessment, no active bleeding noted.   Results discussed with patient and their family. Instructed to hold their Xarelto for the next 5 days. Instructed the importance of following up with their PMD. Strict return precautions given. The patient expressed understanding and feels comfortable being discharged home.

## 2024-11-24 NOTE — ED PROVIDER NOTE - NSFOLLOWUPINSTRUCTIONS_ED_ALL_ED_FT
You were seen in the ED for nose bleeding.     1) Follow up with your doctor 1 week.   2) Return to the ED immediately for new or worsening symptoms: fever, vomiting, lightheadedness, dizziness, chest pain, shortness of breath  3) Please hold your Xarelto for the next 5 days. Continue to take any other home medications as prescribed.   4) Your test results from your ED visit were discussed with you prior to discharge  5) You were provided with a copy of your test results    Nosebleeds (epistaxis) can be very common. The lining or mucosa of the nasal  tissue contains many blood vessels that may bleed easily. A common cause of  nosebleeds is dry air. This dries the nasal membranes and makes the lining of  the nose more susceptible to bleeding. Vigorous nose blowing and picking the  nose can also cause nosebleeds.  Nosebleeds are classified into two different types.  Anterior nose bleed. Most nosebleeds begin in the lower part of the septum.  Nose bleeds in children are almost always the anterior type.  Posterior nose bleed. Less commonly, a nosebleed may begin high and deep  within the nose and flow down the back of the throat. Posterior nosebleeds are  often more severe, requiring a physician's care. They tend to occur more in  individuals with high blood pressure or after trauma to the nose or face.  Medical conditions that may contribute to nosebleeds are allergies, sinusitis, and  a deviated septum. Blood thinners, such as coumadin (warfarin), heparin, and  aspirin and aspirin-containing products can worsen nasal bleeding. Clotting  disorders, fractures of the nose, and rarely, tumors have to be considered.  To stop an anterior nose bleed:  1) Help the patient or child stay calm. A person who is agitated may bleed  more profusely than someone who's been reassured and supported.  Move indoors where it is cool and comfortable. Anxiety and outdoor  heat contribute to bleeding.  2) Pinch all the soft parts of the nose together between the thumb and the  side of the index finger.  3) A cotton ball soaked in Afrin or renita-synephrine may be placed into the  nostril.  4) Press firmly, but gently, the thumb and index finger toward the face,  compressing the pinched parts of the nose against the bones of the  face.  5) Hold the position for 5-10 minutes.  6) Head should be elevated, above the heart.  7) Ice can also be placed toward the nose and cheeks. Cold causes  blood vessels to constrict thereby decreasing bleeding.  If you do experience additional bleeding, the following maneuver could help:  • Completely evacuate your nose of blood clots by blowing into a sink or  Kleenexes and apply 3 to 4 sprays of topical decongestant such as Afrin  to each nostril.  • Call the doctor if bleeding persists.  During periods of nosebleeds (epistaxis) there are several precautions you  should employ for at least 2 weeks to allow healing of your nasal membranes:  • Avoid all vigorous activity. This includes jogging, lifting, yard work,  aerobics, contact sports, or any activity that causes your pulse and blood  pressure to increase.  • Avoid warm liquids, warm foods, or spicy foods. Heat causes blood  vessels to dilate, thereby precipitating bleeding.  • Avoid hot showers and baths. Hot tubs and saunas are off limits.  • Avoid medicines that alter the ability of your blood to clot normally. This  includes over the counter medicines such as aspirin, ibuprofen, Advil,  Motrin, Anaprox, Naprosyn, Orudis, Nuprin, Ecotrin, and other nonsteroidal  anti-inflammatories. Tylenol (acetaminophen) products are  acceptable.  • Do not blow your nose, as this may forcibly disrupt clots along your nasal  membranes. Sneeze through an open mouth.  • Avoid all trauma to your nose.  • Smoking is discouraged.  • Try to use a humidifier in the room in which you sleep or work  • Keep your head elevated at night while sleeping. A lubricating ointment  (bacitracin, A and D ointment, polysporin) may be placed along the  septum several times each day and before bedtime.

## 2024-11-24 NOTE — ED ADULT TRIAGE NOTE - SOURCE OF INFORMATION
[facility] Hospital Medicine  History & Physical    Patient Name: Seamus Griffith Jr.  MRN: [id number]  Admission Date: 6/29/2019  Attending Physician: Jenifer Sepulveda MD  Primary Care Provider: Honorio Lemus MD         Patient information was obtained from patient, spouse/SO, past medical records and ER records.     Subjective:     Principal Problem:Syncope    Chief Complaint:   Chief Complaint   Patient presents with    Loss of Consciousness     was at [facility] here in [place] and weak and dizzy        HPI: Seamus Griffith is a 73 yo with PMHx significant for HLD and neurofibromatosis (type 1).  Pt presents to the ED via EMS for in re-evaluation after syncopal episode at [facility] here in [place] earlier this evening.  Pt states he was walking around here[place]festival when he became dizzy and weak.  His wife states they sat on a bench, he communicated dizziness to her, and then very quickly slouched back his eyes roll back in his head.  She states he then started convulsing in his shoulders.  She states she tried to shake him awake, however he was unresponsive.  This event lasted for approximately 1-2 minutes.  She states after convulsions stopped, Pt was very drowsy and lethargic.  EMS was called and reports Pt awoke in the ambulance.  He recalls being dizzy, but unable to recount anything else.  He denies any chest pain, SOB, vertigo, HA, visual changes, unilateral weakness, numbness, or tingling.  He does endorse 3 episodes of emesis and a strong urge to defecate after the event.  He denies loss of bowel or bladder, and denies biting his tongue.  He was also sweating profusely complained of feeling hot.  He states the symptoms are very abnormal for him.  He denies any similar episodes in the past.  He denies any dizziness with position changes since this event.  He denies any seizure Hx.  He denies any new medications, use of ETOH today, or getting overheated testable.  He states he worked out  this morning then worked out in his yard for a while.  He stated he has stayed well hydrated and denies that this level of activity was excessive for him as he is a very active person at baseline.  His initial workup in the ED was negative for any acute findings.  He was admitted to the service of hospital medication for further evaluation and management.  Other pertinent medical Hx as below:    Past Medical History:   Diagnosis Date    GERD (gastroesophageal reflux disease)     vocal chord ulcers    Gilbert's syndrome     Hyperlipidemia     Hypertension     Neurofibromatosis     Plantar fasciitis        Past Surgical History:   Procedure Laterality Date    COLONOSCOPY  9/13/2013    Dr. Parker, 5 year recheck    ESOPHAGEAL VALADEZ PH N/A 2/12/2016    Performed by Rj Pate MD at [facility] ENDO       Review of patient's allergies indicates:   Allergen Reactions    Sulfa (sulfonamide antibiotics) Other (See Comments)     Gives nightmares       No current facility-administered medications on file prior to encounter.      Current Outpatient Medications on File Prior to Encounter   Medication Sig    aspirin (ECOTRIN) 81 MG EC tablet aspirin 81 mg tablet,delayed release   Take 1 tablet every day by oral route.    atorvastatin (LIPITOR) 80 MG tablet Take 1 tablet (80 mg total) by mouth once daily.    cholecalciferol, vitamin D3, (VITAMIN D3 ORAL) Take by mouth.    docosahexanoic acid (DHA ALGAL-900 ORAL) Take by mouth.    MULTIVITAMIN W-MINERALS/LUTEIN (CENTRUM SILVER ORAL) Take 0.5 tablets by mouth 2 (two) times daily.     TURMERIC ORAL Take by mouth.    sildenafil (REVATIO) 20 mg Tab 1-2 daily prn erectile dysfunction     Family History     Problem Relation (Age of Onset)    Alcohol abuse Brother    Alzheimer's disease Mother, Maternal Aunt    Cancer Father (84), Sister    Liver disease Brother    No Known Problems Daughter, Maternal Uncle, Paternal Aunt, Paternal Uncle, Maternal Grandmother, Maternal  Grandfather, Paternal Grandmother, Paternal Grandfather    Spina bifida Son        Tobacco Use    Smoking status: Former Smoker     Packs/day: 0.50     Years: 2.00     Pack years: 1.00    Smokeless tobacco: Never Used   Substance and Sexual Activity    Alcohol use: Yes     Alcohol/week: 3.6 oz     Types: 6 Glasses of wine per week    Drug use: No    Sexual activity: Not on file     Review of Systems   Constitutional: Positive for diaphoresis. Negative for activity change, appetite change, chills, fatigue and fever.   HENT: Negative for congestion, postnasal drip, sore throat and trouble swallowing.    Eyes: Negative for photophobia and visual disturbance.   Respiratory: Negative for cough, shortness of breath and wheezing.    Cardiovascular: Negative for chest pain, palpitations and leg swelling.   Gastrointestinal: Positive for nausea and vomiting. Negative for abdominal distention, abdominal pain, constipation and diarrhea.   Genitourinary: Negative for difficulty urinating, dysuria, flank pain, frequency and urgency.   Musculoskeletal: Negative for arthralgias and myalgias.   Skin: Negative for color change.   Neurological: Positive for dizziness, syncope, weakness (No focal weakness) and light-headedness. Negative for facial asymmetry, numbness and headaches.   Psychiatric/Behavioral: Negative for confusion. The patient is not nervous/anxious.      Objective:     Vital Signs (Most Recent):  Temp: 99.9 °F (37.7 °C) (06/29/19 2317)  Pulse: 87 (06/29/19 2319)  Resp: 16 (06/29/19 2317)  BP: (!) 145/57 (06/29/19 2319)  SpO2: 97 % (06/29/19 2317) Vital Signs (24h Range):  Temp:  [98.4 °F (36.9 °C)-99.9 °F (37.7 °C)] 99.9 °F (37.7 °C)  Pulse:  [71-87] 87  Resp:  [16-18] 16  SpO2:  [97 %-98 %] 97 %  BP: (130-145)/(57-66) 145/57     Weight: 68 kg (150 lb)  Body mass index is 22.15 kg/m².    Physical Exam   Constitutional: He is oriented to person, place, and time. He appears well-developed and well-nourished. No  distress.   HENT:   Head: Normocephalic and atraumatic.   Eyes: Pupils are equal, round, and reactive to light. Conjunctivae and EOM are normal.   Neck: Normal range of motion. Neck supple. No thyromegaly present.   Cardiovascular: Normal rate, regular rhythm, normal heart sounds and intact distal pulses.   No murmur heard.  Pulmonary/Chest: Effort normal and breath sounds normal. No respiratory distress. He exhibits no tenderness.   Abdominal: Soft. Bowel sounds are normal. He exhibits no distension. There is no tenderness.   Musculoskeletal: Normal range of motion. He exhibits no edema or tenderness.   Neurological: He is alert and oriented to person, place, and time. No cranial nerve deficit or sensory deficit. Coordination normal.   Skin: Skin is warm and dry. Capillary refill takes less than 2 seconds. No erythema.   Diffuse cutaneous tumors.   Psychiatric: He has a normal mood and affect. His behavior is normal. Judgment and thought content normal.         CRANIAL NERVES     CN III, IV, VI   Pupils are equal, round, and reactive to light.  Extraocular motions are normal.        Significant Labs:   CBC:   Recent Labs   Lab 06/29/19 2115   WBC 11.79   HGB 13.3*   HCT 42.6        CMP:   Recent Labs   Lab 06/29/19 2115      K 4.6      CO2 24   *   BUN 24*   CREATININE 1.2   CALCIUM 9.0   PROT 6.4   ALBUMIN 3.6   BILITOT 1.3*   ALKPHOS 60   AST 17   ALT 15   ANIONGAP 7*   EGFRNONAA >60     Cardiac Markers:   Recent Labs   Lab 06/29/19 2115   BNP 65     Troponin:   Recent Labs   Lab 06/29/19 2115   TROPONINI 0.006       Significant Imaging:  CT Head ([facility]):  No acute intracranial abnormality.    Assessment/Plan:     * Syncope  Acute, precipitated by dizziness.  Some aspects of event concerning for possible seizure activity with convulsions and apparent postictal period.  Benign neuro exam with resolved symptoms at this time.  Keep NPO, monitor on telemetry.  Neuro checks q.4 hours.   Obtain TTE, carotid ultrasound, orthostatic vitals.  Continue IVFs.  Trend troponin.  Check EEG and MRI.  Consult with Neurology.  Fall and seizure precautions.      Neurofibromatosis  Established diagnosis.  Questionable development of seizure activity with his neurofibromatosis putting him at high risk for seizures.  We consulted Neurology for further evaluation and management.      Hyperlipidemia  Chronic, continue statin therapy.        VTE Risk Mitigation (From admission, onward)        Ordered     IP VTE HIGH RISK PATIENT  Once      06/29/19 2313     Place JACINTA hose  Until discontinued      06/29/19 2313     Place sequential compression device  Until discontinued      06/29/19 2313             Jeri Jackson NP  Department of Hospital Medicine   [facility]   Patient

## 2024-11-24 NOTE — ED ADULT NURSE NOTE - CHIEF COMPLAINT QUOTE
epistaxis to L nostril, on xarelto  pt seen in ED Friday for epistaxis and DCd with nasal packing, reports packing was removed yesterday, onset of bleeding soon after  pt has gauze in L nostril on arrival

## 2024-11-25 ENCOUNTER — APPOINTMENT (OUTPATIENT)
Dept: OTOLARYNGOLOGY | Facility: CLINIC | Age: 84
End: 2024-11-25
Payer: MEDICARE

## 2024-11-25 ENCOUNTER — NON-APPOINTMENT (OUTPATIENT)
Age: 84
End: 2024-11-25

## 2024-11-25 VITALS — TEMPERATURE: 98 F | DIASTOLIC BLOOD PRESSURE: 68 MMHG | SYSTOLIC BLOOD PRESSURE: 126 MMHG | HEART RATE: 48 BPM

## 2024-11-25 DIAGNOSIS — R04.0 EPISTAXIS: ICD-10-CM

## 2024-11-25 DIAGNOSIS — J34.2 DEVIATED NASAL SEPTUM: ICD-10-CM

## 2024-11-25 DIAGNOSIS — J06.9 ACUTE UPPER RESPIRATORY INFECTION, UNSPECIFIED: ICD-10-CM

## 2024-11-25 PROCEDURE — 99204 OFFICE O/P NEW MOD 45 MIN: CPT | Mod: 25

## 2024-11-25 PROCEDURE — 30903 CONTROL OF NOSEBLEED: CPT | Mod: 50

## 2024-11-25 RX ORDER — AZITHROMYCIN 250 MG/1
250 TABLET, FILM COATED ORAL
Qty: 6 | Refills: 0 | Status: ACTIVE | COMMUNITY
Start: 2024-11-25 | End: 1900-01-01

## 2024-12-24 ENCOUNTER — APPOINTMENT (OUTPATIENT)
Dept: INTERNAL MEDICINE | Facility: CLINIC | Age: 84
End: 2024-12-24
Payer: MEDICARE

## 2024-12-24 PROCEDURE — 36415 COLL VENOUS BLD VENIPUNCTURE: CPT

## 2024-12-26 DIAGNOSIS — I48.0 PAROXYSMAL ATRIAL FIBRILLATION: ICD-10-CM

## 2024-12-30 ENCOUNTER — APPOINTMENT (OUTPATIENT)
Dept: INTERNAL MEDICINE | Facility: CLINIC | Age: 84
End: 2024-12-30
Payer: MEDICARE

## 2024-12-30 ENCOUNTER — NON-APPOINTMENT (OUTPATIENT)
Age: 84
End: 2024-12-30

## 2024-12-30 VITALS
DIASTOLIC BLOOD PRESSURE: 63 MMHG | HEART RATE: 86 BPM | BODY MASS INDEX: 23.99 KG/M2 | SYSTOLIC BLOOD PRESSURE: 110 MMHG | HEIGHT: 69 IN | OXYGEN SATURATION: 87 % | WEIGHT: 162 LBS

## 2024-12-30 LAB
FERRITIN SERPL-MCNC: 75 NG/ML
FOLATE SERPL-MCNC: >20 NG/ML
IRON SATN MFR SERPL: 13 %
IRON SERPL-MCNC: 39 UG/DL
TIBC SERPL-MCNC: 301 UG/DL
UIBC SERPL-MCNC: 261 UG/DL
VIT B12 SERPL-MCNC: 618 PG/ML

## 2024-12-30 PROCEDURE — G0444 DEPRESSION SCREEN ANNUAL: CPT

## 2024-12-30 PROCEDURE — G0439: CPT

## 2024-12-30 PROCEDURE — 93000 ELECTROCARDIOGRAM COMPLETE: CPT | Mod: 59

## 2025-01-14 ENCOUNTER — OUTPATIENT (OUTPATIENT)
Dept: OUTPATIENT SERVICES | Facility: HOSPITAL | Age: 85
LOS: 1 days | Discharge: HOME | End: 2025-01-14

## 2025-01-14 ENCOUNTER — INPATIENT (INPATIENT)
Facility: HOSPITAL | Age: 85
LOS: 1 days | Discharge: HOME | DRG: 378 | End: 2025-01-16
Attending: STUDENT IN AN ORGANIZED HEALTH CARE EDUCATION/TRAINING PROGRAM | Admitting: HOSPITALIST
Payer: MEDICARE

## 2025-01-14 VITALS
HEIGHT: 69 IN | RESPIRATION RATE: 18 BRPM | TEMPERATURE: 98 F | OXYGEN SATURATION: 96 % | WEIGHT: 162.92 LBS | SYSTOLIC BLOOD PRESSURE: 107 MMHG | DIASTOLIC BLOOD PRESSURE: 68 MMHG | HEART RATE: 71 BPM

## 2025-01-14 DIAGNOSIS — D64.9 ANEMIA, UNSPECIFIED: ICD-10-CM

## 2025-01-14 DIAGNOSIS — Z96.643 PRESENCE OF ARTIFICIAL HIP JOINT, BILATERAL: Chronic | ICD-10-CM

## 2025-01-14 DIAGNOSIS — I10 ESSENTIAL (PRIMARY) HYPERTENSION: ICD-10-CM

## 2025-01-14 DIAGNOSIS — D62 ACUTE POSTHEMORRHAGIC ANEMIA: ICD-10-CM

## 2025-01-14 DIAGNOSIS — Z90.89 ACQUIRED ABSENCE OF OTHER ORGANS: Chronic | ICD-10-CM

## 2025-01-14 DIAGNOSIS — I48.20 CHRONIC ATRIAL FIBRILLATION, UNSPECIFIED: ICD-10-CM

## 2025-01-14 DIAGNOSIS — Z90.49 ACQUIRED ABSENCE OF OTHER SPECIFIED PARTS OF DIGESTIVE TRACT: Chronic | ICD-10-CM

## 2025-01-14 DIAGNOSIS — Z98.41 CATARACT EXTRACTION STATUS, RIGHT EYE: Chronic | ICD-10-CM

## 2025-01-14 DIAGNOSIS — Z29.9 ENCOUNTER FOR PROPHYLACTIC MEASURES, UNSPECIFIED: ICD-10-CM

## 2025-01-14 DIAGNOSIS — K92.2 GASTROINTESTINAL HEMORRHAGE, UNSPECIFIED: ICD-10-CM

## 2025-01-14 LAB
ALBUMIN SERPL ELPH-MCNC: 4 G/DL — SIGNIFICANT CHANGE UP (ref 3.3–5)
ALP SERPL-CCNC: 41 U/L — SIGNIFICANT CHANGE UP (ref 40–120)
ALT FLD-CCNC: 23 U/L — SIGNIFICANT CHANGE UP (ref 10–45)
ANION GAP SERPL CALC-SCNC: 12 MMOL/L — SIGNIFICANT CHANGE UP (ref 5–17)
ANISOCYTOSIS BLD QL: SLIGHT — SIGNIFICANT CHANGE UP
APTT BLD: 39.4 SEC — HIGH (ref 24.5–35.6)
AST SERPL-CCNC: 35 U/L — SIGNIFICANT CHANGE UP (ref 10–40)
BASOPHILS # BLD AUTO: 0 K/UL — SIGNIFICANT CHANGE UP (ref 0–0.2)
BASOPHILS NFR BLD AUTO: 0 % — SIGNIFICANT CHANGE UP (ref 0–2)
BILIRUB SERPL-MCNC: 0.4 MG/DL — SIGNIFICANT CHANGE UP (ref 0.2–1.2)
BLD GP AB SCN SERPL QL: NEGATIVE — SIGNIFICANT CHANGE UP
BUN SERPL-MCNC: 26 MG/DL — HIGH (ref 7–23)
BURR CELLS BLD QL SMEAR: PRESENT — SIGNIFICANT CHANGE UP
CALCIUM SERPL-MCNC: 9.4 MG/DL — SIGNIFICANT CHANGE UP (ref 8.4–10.5)
CHLORIDE SERPL-SCNC: 104 MMOL/L — SIGNIFICANT CHANGE UP (ref 96–108)
CO2 SERPL-SCNC: 22 MMOL/L — SIGNIFICANT CHANGE UP (ref 22–31)
CREAT SERPL-MCNC: 1.06 MG/DL — SIGNIFICANT CHANGE UP (ref 0.5–1.3)
DACRYOCYTES BLD QL SMEAR: SLIGHT — SIGNIFICANT CHANGE UP
EGFR: 69 ML/MIN/1.73M2 — SIGNIFICANT CHANGE UP
EOSINOPHIL # BLD AUTO: 0.09 K/UL — SIGNIFICANT CHANGE UP (ref 0–0.5)
EOSINOPHIL NFR BLD AUTO: 0.9 % — SIGNIFICANT CHANGE UP (ref 0–6)
GLUCOSE SERPL-MCNC: 115 MG/DL — HIGH (ref 70–99)
HCT VFR BLD CALC: 23.3 % — LOW (ref 39–50)
HGB BLD-MCNC: 7 G/DL — CRITICAL LOW (ref 13–17)
HYPOCHROMIA BLD QL: SLIGHT — SIGNIFICANT CHANGE UP
INR BLD: 1.13 RATIO — SIGNIFICANT CHANGE UP (ref 0.85–1.16)
LYMPHOCYTES # BLD AUTO: 2.83 K/UL — SIGNIFICANT CHANGE UP (ref 1–3.3)
LYMPHOCYTES # BLD AUTO: 28.9 % — SIGNIFICANT CHANGE UP (ref 13–44)
MACROCYTES BLD QL: SLIGHT — SIGNIFICANT CHANGE UP
MANUAL SMEAR VERIFICATION: SIGNIFICANT CHANGE UP
MCHC RBC-ENTMCNC: 28.6 PG — SIGNIFICANT CHANGE UP (ref 27–34)
MCHC RBC-ENTMCNC: 30 G/DL — LOW (ref 32–36)
MCV RBC AUTO: 95.1 FL — SIGNIFICANT CHANGE UP (ref 80–100)
MONOCYTES # BLD AUTO: 0.86 K/UL — SIGNIFICANT CHANGE UP (ref 0–0.9)
MONOCYTES NFR BLD AUTO: 8.8 % — SIGNIFICANT CHANGE UP (ref 2–14)
NEUTROPHILS # BLD AUTO: 6.01 K/UL — SIGNIFICANT CHANGE UP (ref 1.8–7.4)
NEUTROPHILS NFR BLD AUTO: 61.4 % — SIGNIFICANT CHANGE UP (ref 43–77)
OVALOCYTES BLD QL SMEAR: SLIGHT — SIGNIFICANT CHANGE UP
PLAT MORPH BLD: NORMAL — SIGNIFICANT CHANGE UP
PLATELET # BLD AUTO: 276 K/UL — SIGNIFICANT CHANGE UP (ref 150–400)
POIKILOCYTOSIS BLD QL AUTO: SIGNIFICANT CHANGE UP
POLYCHROMASIA BLD QL SMEAR: SLIGHT — SIGNIFICANT CHANGE UP
POTASSIUM SERPL-MCNC: 4.3 MMOL/L — SIGNIFICANT CHANGE UP (ref 3.5–5.3)
POTASSIUM SERPL-SCNC: 4.3 MMOL/L — SIGNIFICANT CHANGE UP (ref 3.5–5.3)
PROT SERPL-MCNC: 6.3 G/DL — SIGNIFICANT CHANGE UP (ref 6–8.3)
PROTHROM AB SERPL-ACNC: 12.9 SEC — SIGNIFICANT CHANGE UP (ref 9.9–13.4)
RBC # BLD: 2.45 M/UL — LOW (ref 4.2–5.8)
RBC # FLD: 17.6 % — HIGH (ref 10.3–14.5)
RBC BLD AUTO: ABNORMAL
RH IG SCN BLD-IMP: POSITIVE — SIGNIFICANT CHANGE UP
SCHISTOCYTES BLD QL AUTO: SLIGHT — SIGNIFICANT CHANGE UP
SODIUM SERPL-SCNC: 138 MMOL/L — SIGNIFICANT CHANGE UP (ref 135–145)
TARGETS BLD QL SMEAR: SLIGHT — SIGNIFICANT CHANGE UP
WBC # BLD: 9.79 K/UL — SIGNIFICANT CHANGE UP (ref 3.8–10.5)
WBC # FLD AUTO: 9.79 K/UL — SIGNIFICANT CHANGE UP (ref 3.8–10.5)

## 2025-01-14 PROCEDURE — 99285 EMERGENCY DEPT VISIT HI MDM: CPT | Mod: FS

## 2025-01-14 PROCEDURE — 99223 1ST HOSP IP/OBS HIGH 75: CPT

## 2025-01-14 RX ORDER — PANTOPRAZOLE 40 MG/1
40 TABLET, DELAYED RELEASE ORAL
Refills: 0 | Status: DISCONTINUED | OUTPATIENT
Start: 2025-01-14 | End: 2025-01-16

## 2025-01-14 RX ORDER — ACETAMINOPHEN 80 MG/.8ML
650 SOLUTION/ DROPS ORAL EVERY 6 HOURS
Refills: 0 | Status: DISCONTINUED | OUTPATIENT
Start: 2025-01-14 | End: 2025-01-16

## 2025-01-14 RX ORDER — GINKGO BILOBA 40 MG
3 CAPSULE ORAL AT BEDTIME
Refills: 0 | Status: DISCONTINUED | OUTPATIENT
Start: 2025-01-14 | End: 2025-01-16

## 2025-01-14 RX ORDER — ONDANSETRON 4 MG/1
4 TABLET ORAL EVERY 8 HOURS
Refills: 0 | Status: DISCONTINUED | OUTPATIENT
Start: 2025-01-14 | End: 2025-01-16

## 2025-01-14 RX ORDER — MAG HYDROX/ALUMINUM HYD/SIMETH 200-200-20
30 SUSPENSION, ORAL (FINAL DOSE FORM) ORAL EVERY 4 HOURS
Refills: 0 | Status: DISCONTINUED | OUTPATIENT
Start: 2025-01-14 | End: 2025-01-16

## 2025-01-14 NOTE — CONSULT NOTE ADULT - NSCONSULTADDITIONALINFOA_GEN_ALL_CORE
Savage Baugh MD, FACG, [facility]  [phone]     CPT 71626, 70 minutes spent face-to-face, reviewing results, coordinating care.

## 2025-01-14 NOTE — H&P ADULT - NSHPPHYSICALEXAM_GEN_ALL_CORE
T(C): 36.7 (01-14-25 @ 22:13), Max: 36.7 (01-14-25 @ 22:13)  HR: 72 (01-14-25 @ 22:13) (51 - 72)  BP: 123/77 (01-14-25 @ 22:13) (107/68 - 128/86)  RR: 17 (01-14-25 @ 22:13) (17 - 18)  SpO2: 98% (01-14-25 @ 22:13) (96% - 99%)    CONSTITUTIONAL: Well groomed, no apparent distress  EYES: PERRLA , EOMI  ENMT: MMM. Normal dentition  RESP: No respiratory distress, CTA b/l  CV: +S1S2, RRR, no MRG  GI: Soft, NTND, no RGR  MSK: spontaneously moves all extremities   SKIN: No rashes or ulcers noted  NEURO:  No focal deficits, sensation intact throughout   PSYCH: A+O x 3, mood and affect appropriate T(C): 36.7 (01-14-25 @ 22:13), Max: 36.7 (01-14-25 @ 22:13)  HR: 72 (01-14-25 @ 22:13) (51 - 72)  BP: 123/77 (01-14-25 @ 22:13) (107/68 - 128/86)  RR: 17 (01-14-25 @ 22:13) (17 - 18)  SpO2: 98% (01-14-25 @ 22:13) (96% - 99%)    CONSTITUTIONAL: Well groomed, no apparent distress  EYES: PERRLA , EOMI, pale conjunctiva   ENMT: MMM. Normal dentition  RESP: No respiratory distress, CTA b/l  CV: +S1S2, RRR, no MRG  GI: Soft, NTND, no RGR  MSK: spontaneously moves all extremities   SKIN: No rashes or ulcers noted  NEURO:  No focal deficits, sensation intact throughout   PSYCH: A+O x 3, mood and affect appropriate

## 2025-01-14 NOTE — ED ADULT NURSE NOTE - OBJECTIVE STATEMENT
84y male, AAOx4, PMH Afib on Eliquis, recent black stools, had endo done that showed small upper GI bleed that was cauterized. presents with hemoglobin hovering around 6.2-6.6 outpt, received 2 outpt iron infusions with no improvement, denies chest pain, shortness of breath, abdominal pain, 20g right ac, placed in gown, side rails up for safety, bed in lowest position, call bell within reach, patient and family educated on plan of care, comfort and safety provided.

## 2025-01-14 NOTE — ED PROVIDER NOTE - CLINICAL SUMMARY MEDICAL DECISION MAKING FREE TEXT BOX
attending Bradley: 84yM h/o HTN, HLD, A fib on Xarelto sent in for anemia. Had hematemesis around Christmastime, had outpatient workup showing bleeding in stomach, cauterized. Now with persistent dark stools. Has held Xarelto for last 3 days. Exam as above. Will obtain labs, transfuse PRN, discuss with GI, reassess

## 2025-01-14 NOTE — CONSULT NOTE ADULT - ASSESSMENT
84 y.o. male with Afib, HTN, hyperlipidemia, GERD, now with iron deficiency anemia and GI bleeding.  He is usually on aspirin and Xarelto, and his Cardiologist is Dr. Steve Diaz.  About 10 days ago, he had one episode of coffee ground emesis which he attributed to a possible "stomach flu" after eating at a restaurant.  Since then, he has dark, guaiac positive stool.  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  Over the past 2-3 weeks, his Hgb has dropped from 11.5 to 7.  Yesterday, his Hgb was 6.6 at the Hematologist, Dr. Darian Guillen, and he received IV iron.  Then, today his repeat Hgb was 6.2 with Dr. Darian Guillen.  His Hgb has been dropping even while he stopped taking Xarelto and aspirin.  The patient was recently seen in the ER for nose bleed, and he temporarily stopped Xarelto at that time.  His last colonoscopy was done 4 years ago, and was unremarkable.     - I recommend that the patient be admitted.  - Plan for EGD and colonoscopy after Hgb improved with blood transfusions.  I am concerned the patient has a Dieulafoy's lesion or possible lower GI source of guaiac positive stool and anemia.   - Clear liquid diet.   - I will contact [facility] regarding a consult and timing of EGD and colonoscopy.  - Serial H/H.   - Hold Xarelto and aspirin for now.   - Consider Cardiology consult and Hematology consult.  84 y.o. male with Afib, HTN, hyperlipidemia, GERD, now with iron deficiency anemia and GI bleeding.  He is usually on aspirin and Xarelto, and his Cardiologist is Dr. Steve Diaz.  About 10 days ago, he had one episode of coffee ground emesis which he attributed to a possible "stomach flu" after eating at a restaurant.  Since then, he has dark, guaiac positive stool.  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  Over the past 2-3 weeks, his Hgb has dropped from 11.5 to 7.  Yesterday, his Hgb was 6.6 at the Hematologist, Dr. Darian Guillen, and he received IV iron.  Then, today his repeat Hgb was 6.2 with Dr. Darian Guillne.  His Hgb has been dropping even while he stopped taking Xarelto and aspirin.  The patient was recently seen in the ER for nose bleed, and he temporarily stopped Xarelto at that time.  His last colonoscopy was done 4 years ago, and was unremarkable.     - I recommend that the patient be admitted.  - Plan for EGD and colonoscopy after Hgb improved with blood transfusions.  I am concerned the patient has a Dieulafoy's lesion or possible lower GI source of guaiac positive stool and anemia.   - Clear liquid diet.   - Patient may benefit from a total of 2 units PRBCs.   - Serial H/H.   - Hold Xarelto and aspirin for now.   - Consider Cardiology consult and Hematology consult.

## 2025-01-14 NOTE — ED PROVIDER NOTE - IV ALTEPLASE INCLUSION HIDDEN
Eduarda Dorado called to request a refill on her medication. Last office visit : 10/27/2021   Next office visit : 2/3/2022     Last UDS: No results found for: Keyona Capes, LABBENZ, BUPRENUR, COCAIMETSCRU, GABAPENTIN, MDMA, METAMPU, OPIATESCREENURINE, OXTCOSU, PHENCYCLIDINESCREENURINE, PROPOXYPHENE, THCSCREENUR, TRICYUR    Last Joaquin: 10/22/2021  Medication Contract: 07/28/2021  Last Fill: 10/05/2021    Requested Prescriptions     Pending Prescriptions Disp Refills    LORazepam (ATIVAN) 1 MG tablet 90 tablet 0     Sig: Take one every eight hours as needed         Please approve or refuse this medication.    Patria Woo MA show

## 2025-01-14 NOTE — ED ADULT NURSE NOTE - NSFALLHARMRISKINTERV_ED_ALL_ED

## 2025-01-14 NOTE — ED PROVIDER NOTE - PHYSICAL EXAMINATION
CONSTITUTIONAL: Patient is awake, alert and oriented x 3. Patient is well appearing and in no acute distress  HEAD: NCAT  NECK: supple, FROM  LUNGS: CTA b/l, no wheezing or rales   HEART: RRR.+S1S2   ABDOMEN: Soft, non-distended, nttp,  no rebound or guarding  EXTREMITY: No edema or calf tenderness b/l, FROM upper and lower ext b/l  SKIN: No rash or lesions  NEURO: No focal deficits

## 2025-01-14 NOTE — H&P ADULT - NSHPLABSRESULTS_GEN_ALL_CORE
7.0    9.79  )-----------( 276      ( 14 Jan 2025 18:27 )             23.3       01-14    138  |  104  |  26[H]  ----------------------------<  115[H]  4.3   |  22  |  1.06    Ca    9.4      14 Jan 2025 18:27    TPro  6.3  /  Alb  4.0  /  TBili  0.4  /  DBili  x   /  AST  35  /  ALT  23  /  AlkPhos  41  01-14      Blood Gas Profile - Venous (01.14.25 @ 18:10)    pH, Venous: 7.27: Due to specimen delivery delays, please interpret with caution    pCO2, Venous: 52 mmHg    pO2, Venous: 18 mmHg    HCO3, Venous: 24 mmol/L    Base Excess, Venous: -2.9 mmol/L    Oxygen Saturation, Venous: 19.8 %    Total CO2, Venous: 26 mmol/L    Blood Gas Source Venous: Venous 7.0    9.79  )-----------( 276      ( 14 Jan 2025 18:27 )             23.3       01-14    138  |  104  |  26[H]  ----------------------------<  115[H]  4.3   |  22  |  1.06    Ca    9.4      14 Jan 2025 18:27    TPro  6.3  /  Alb  4.0  /  TBili  0.4  /  DBili  x   /  AST  35  /  ALT  23  /  AlkPhos  41  01-14      Blood Gas Profile - Venous (01.14.25 @ 18:10)    pH, Venous: 7.27: Due to specimen delivery delays, please interpret with caution    pCO2, Venous: 52 mmHg    pO2, Venous: 18 mmHg    HCO3, Venous: 24 mmol/L    Base Excess, Venous: -2.9 mmol/L    Oxygen Saturation, Venous: 19.8 %    Total CO2, Venous: 26 mmol/L    Blood Gas Source Venous: Venous      - - - - - - - - - - - - - - - - - - - - - - - - - - - - - - - - - - - - - - - - - - - - - - - - - - - -       EKG PERSONALLY REVIEWED:  Afib 62 bpm     IMAGES PERSONALLY REVIEWED: X

## 2025-01-14 NOTE — ED PROVIDER NOTE - OBJECTIVE STATEMENT
84 year old male with history of hypertension, hyperlipidemia, GERD presents to the ED for evaluation of anemia. Patient reports around [place] he had an episode of hematemesis, with bright red blood. Pt followed up with GI and scheduled for an endoscopy which was performed on 1/7 which showed area of bleeding at antrum of stomach which was cauterized.  Patient reports that he also had associated dark stool which has been persistent as well.  Patient was scheduled for follow-up blood work and had a hemoglobin of 6.6 and was ordered for an iron infusion with plan for repeat CBC.  Today hemoglobin was lower at 6.2 and patient was referred to the ED.  Patient reports that he feels mild fatigue.  Was prescribed Xarelto approximately 6 months ago for A-fib but has not taken over the last 3 days.  Has never had similar bleeding before.  Last colonoscopy approximately 5 years ago.  Denies bright red blood per rectum, chest pain, shortness of breath, abdominal pain recurrent vomiting

## 2025-01-14 NOTE — ED PROVIDER NOTE - RAPID ASSESSMENT
84-year-old male history of HTN HLD GERD presents from outpatient gastroenterology office for low hemoglobin level of 6.2.  Patient reports had an endoscopy 6 days ago found a bleed in the antrum of the stomach status post cauterization.  Hemoglobin yesterday was 6.6, had iron transfusion, hemoglobin 6.2 today with additional iron transfusion.  Reports dark stools and wife notes bright blood on the bed sheets this morning.  Reports generalized weakness/lethargy.  Able to tolerate p.o. denies hematemesis though had prior to the cauterization.  Was on Xarelto he has discontinued since the bleeding started.  He was told by his gastroenterologist he would likely be getting admitted for endoscopy/colonoscopy.    Brief exam: pallor, no acute distress    Best contact number: [phone]    Yemi POTTS PA-C saw patient as a rapid assessment initially in triage. The rest of care to be performed by the primary ED team. Receiving team will follow up on labs, analgesia, any clinical imaging, and perform reassessment and disposition of the patient as clinically indicated. All decisions regarding the progression of care will be made at their discretion.

## 2025-01-14 NOTE — H&P ADULT - ASSESSMENT
84y M pmh HTN, HLD, GERD, Afib on Xarelto (last dose 3days ago) presents for eval of anemia a/f acute blood loss anemia

## 2025-01-14 NOTE — CONSULT NOTE ADULT - SUBJECTIVE AND OBJECTIVE BOX
HPI:  84 y.o. male with Afib, HTN, hyperlipidemia, GERD, now with iron deficiency anemia and GI bleeding.  He is usually on aspirin and Xarelto, and his Cardiologist is Dr. Steve Diaz.  About 10 days ago, he had one episode of coffee ground emesis which he attributed to a possible "stomach flu" after eating at a restaurant.  Since then, he has dark, guaiac positive stool.  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  Over the past 2-3 weeks, his Hgb has dropped from 11.5 to 7.  Yesterday, his Hgb was 6.6 at the Hematologist, Dr. Darian Guillen, and he received IV iron.  Then, today his repeat Hgb was 6.2 with Dr. Darian Guillen.  His Hgb has been dropping even while he stopped taking Xarelto and aspirin.  The patient was recently seen in the ER for nose bleed, and he temporarily stopped Xarelto at that time.  His last colonoscopy was done 4 years ago, and was unremarkable.       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      History of appendectomy  1943      History of cataract extraction, right  2012      History of tonsillectomy  1948          Allergies    No Known Allergies    Intolerances        MEDICATIONS  (STANDING):    MEDICATIONS  (PRN):          Review of Systems:    General:  No wt loss, fevers, chills, night sweats,fatigue,   CV:  No pain, palpitatioins, hypo/hypertension  Resp:  No dyspnea, cough, tachypnea, wheezing  GI:  No pain, No nausea, No vomiting, No diarrhea, No constipatiion, No weight loss, No fever, No pruritis, No rectal bleeding, No tarry stools, No dysphagia,  :  No pain, bleeding, incontinence, nocturia  Muscle:  No pain, weakness  Neuro:  No weakness, tingling, memory problems  Psych:  No fatigue, insomnia, mood problems, depression  Endocrine:  No polyuria, polydypsia, cold/heat intolerance  Heme:  No petechiae, ecchymosis, easy bruisability  Skin:  No rash, tattoos, scars, edema  Otherwise 10 point ROS negative      Vital Signs Last 24 Hrs  T(C): 36.5 (14 Jan 2025 18:38), Max: 36.6 (14 Jan 2025 15:02)  T(F): 97.7 (14 Jan 2025 18:38), Max: 97.9 (14 Jan 2025 15:02)  HR: 51 (14 Jan 2025 18:38) (51 - 71)  BP: 115/77 (14 Jan 2025 18:38) (107/68 - 115/77)  BP(mean): --  RR: 17 (14 Jan 2025 18:38) (17 - 18)  SpO2: 99% (14 Jan 2025 18:38) (96% - 99%)    Parameters below as of 14 Jan 2025 18:38  Patient On (Oxygen Delivery Method): room air        PHYSICAL EXAM:    Constitutional: NAD, well-developed  Neck: No LAD, supple  Respiratory: CTA and P  Cardiovascular: S1 and S2, RRR, no M  Gastrointestinal: BS+, soft, NT/ND, neg HSM,  Extremities: No peripheral edema, neg clubing, cyanosis  Vascular: 2+ peripheral pulses  Neurological: A/O x 3, no focal deficits  Psychiatric: Normal mood, normal affect  Skin: No rashes        LABS:                        7.0    9.79  )-----------( 276      ( 14 Jan 2025 18:27 )             23.3     01-14    138  |  104  |  26[H]  ----------------------------<  115[H]  4.3   |  22  |  1.06    Ca    9.4      14 Jan 2025 18:27    TPro  6.3  /  Alb  4.0  /  TBili  0.4  /  DBili  x   /  AST  35  /  ALT  23  /  AlkPhos  41  01-14    PT/INR - ( 14 Jan 2025 18:27 )   PT: 12.9 sec;   INR: 1.13 ratio         PTT - ( 14 Jan 2025 18:27 )  PTT:39.4 sec  Urinalysis Basic - ( 14 Jan 2025 18:27 )    Color: x / Appearance: x / SG: x / pH: x  Gluc: 115 mg/dL / Ketone: x  / Bili: x / Urobili: x   Blood: x / Protein: x / Nitrite: x   Leuk Esterase: x / RBC: x / WBC x   Sq Epi: x / Non Sq Epi: x / Bacteria: x      LIVER FUNCTIONS - ( 14 Jan 2025 18:27 )  Alb: 4.0 g/dL / Pro: 6.3 g/dL / ALK PHOS: 41 U/L / ALT: 23 U/L / AST: 35 U/L / GGT: x             RADIOLOGY & ADDITIONAL TESTS:    Savage Baugh MD, FACG, [facility]  [facility]  [phone]

## 2025-01-14 NOTE — H&P ADULT - PROBLEM SELECTOR PLAN 3
- Hold Xarelto & ASA  iso GIB  - - Hold Xarelto & ASA  iso GIB - BP stable  - Amlodipine, Ramipril held iso GIB   - Resume as tolerates

## 2025-01-14 NOTE — H&P ADULT - HISTORY OF PRESENT ILLNESS
84y M pmh HTN, HLD, GERD, Afib on Xarelto (last dose 3days ago) presents for eval of anemia. Patient reports episode of hematemesis around [place] he was followed up with GI, had endoscopy 1/7 which showed area of bleeding at antrum of stomach which was cauterized.  Patient reports persistent dark stool since. Patient was scheduled for follow-up blood work and had a hemoglobin of 6.6 so was ordered for an iron infusion with plan for repeat CBC.  Today hemoglobin was lower at 6.2  so patient was referred to the ED.  Patient reports feeling mild fatigue. Last colonoscopy approximately 5 years ago.    ROS: Denies HA, CP, SOB, palpitation, N/V/D, fever, cough, chills, dizziness, abm pain, change in urinary habits   A 10-system ROS was performed and is negative except as noted above and/or in the HPI.    ED: had blood work, 1U PRBC transfused         84y M pmh HTN, HLD, GERD, Afib on Xarelto (last dose 3days ago) presents for eval of anemia. Patient reports episode of hematemesis around [place] he was followed up with GI, had endoscopy 1/7 which showed area of bleeding at antrum of stomach which was cauterized.  Patient reports persistent dark stool since. Patient was scheduled for follow-up blood work and had a hemoglobin of 6.6 so was ordered for an iron infusion with plan for repeat CBC.  Today hemoglobin was lower at 6.2  so patient was referred to the ED.  Patient reports feeling mild fatigue. Last colonoscopy approximately 5 years ago.    ROS: Denies HA, CP, SOB, palpitation, N/V/D, fever, cough, chills, dizziness, abm pain, change in urinary habits   A 10-system ROS was performed and is negative except as noted above and/or in the HPI.    ED: had blood work, 1U PRBC

## 2025-01-14 NOTE — H&P ADULT - PROBLEM SELECTOR PLAN 4
- DVT ppx: SCD, chemical ppx held for GIB  - GI ppx: PPI   - Diet: CLD > NPO - DVT ppx: SCD, chemical ppx held for GIB  - GI ppx: PPI   - Diet: CLD > NPO @ [place] - Hold Xarelto & ASA  iso GIB

## 2025-01-14 NOTE — ED CLERICAL - NS ED CLERK NOTE PRE-ARRIVAL INFORMATION; ADDITIONAL PRE-ARRIVAL INFORMATION
CC/Reason For referral: worsening anemia. Hx unspecified anemia  Preferred Consultant(if applicable):  Who admits for you (if needed):  Do you have documents you would like to fax over?  Would you still like to speak to an ED attending?  please call after patient is seen

## 2025-01-15 ENCOUNTER — APPOINTMENT (OUTPATIENT)
Dept: HEMATOLOGY ONCOLOGY | Facility: CLINIC | Age: 85
End: 2025-01-15

## 2025-01-15 LAB
ANION GAP SERPL CALC-SCNC: 9 MMOL/L — SIGNIFICANT CHANGE UP (ref 5–17)
BUN SERPL-MCNC: 18 MG/DL — SIGNIFICANT CHANGE UP (ref 7–23)
CALCIUM SERPL-MCNC: 8.8 MG/DL — SIGNIFICANT CHANGE UP (ref 8.4–10.5)
CHLORIDE SERPL-SCNC: 108 MMOL/L — SIGNIFICANT CHANGE UP (ref 96–108)
CO2 SERPL-SCNC: 22 MMOL/L — SIGNIFICANT CHANGE UP (ref 22–31)
CREAT SERPL-MCNC: 0.97 MG/DL — SIGNIFICANT CHANGE UP (ref 0.5–1.3)
EGFR: 77 ML/MIN/1.73M2 — SIGNIFICANT CHANGE UP
GLUCOSE SERPL-MCNC: 85 MG/DL — SIGNIFICANT CHANGE UP (ref 70–99)
HCT VFR BLD CALC: 24.5 % — LOW (ref 39–50)
HGB BLD-MCNC: 7.6 G/DL — LOW (ref 13–17)
INR BLD: 1.13 RATIO — SIGNIFICANT CHANGE UP (ref 0.85–1.16)
MCHC RBC-ENTMCNC: 27.9 PG — SIGNIFICANT CHANGE UP (ref 27–34)
MCHC RBC-ENTMCNC: 31 G/DL — LOW (ref 32–36)
MCV RBC AUTO: 90.1 FL — SIGNIFICANT CHANGE UP (ref 80–100)
NRBC # BLD: 0 /100 WBCS — SIGNIFICANT CHANGE UP (ref 0–0)
PLATELET # BLD AUTO: 222 K/UL — SIGNIFICANT CHANGE UP (ref 150–400)
POTASSIUM SERPL-MCNC: 4.2 MMOL/L — SIGNIFICANT CHANGE UP (ref 3.5–5.3)
POTASSIUM SERPL-SCNC: 4.2 MMOL/L — SIGNIFICANT CHANGE UP (ref 3.5–5.3)
PROTHROM AB SERPL-ACNC: 13 SEC — SIGNIFICANT CHANGE UP (ref 9.9–13.4)
RBC # BLD: 2.72 M/UL — LOW (ref 4.2–5.8)
RBC # FLD: 17.6 % — HIGH (ref 10.3–14.5)
SODIUM SERPL-SCNC: 139 MMOL/L — SIGNIFICANT CHANGE UP (ref 135–145)
WBC # BLD: 7.28 K/UL — SIGNIFICANT CHANGE UP (ref 3.8–10.5)
WBC # FLD AUTO: 7.28 K/UL — SIGNIFICANT CHANGE UP (ref 3.8–10.5)

## 2025-01-15 PROCEDURE — 99223 1ST HOSP IP/OBS HIGH 75: CPT

## 2025-01-15 PROCEDURE — 99233 SBSQ HOSP IP/OBS HIGH 50: CPT

## 2025-01-15 DEVICE — CLIP RESOLUTION 360 235CM: Type: IMPLANTABLE DEVICE | Status: FUNCTIONAL

## 2025-01-15 RX ORDER — SODIUM CHLORIDE 9 MG/ML
500 INJECTION, SOLUTION INTRAMUSCULAR; INTRAVENOUS; SUBCUTANEOUS
Refills: 0 | Status: DISCONTINUED | OUTPATIENT
Start: 2025-01-15 | End: 2025-01-16

## 2025-01-15 RX ORDER — PANTOPRAZOLE 40 MG/1
1 TABLET, DELAYED RELEASE ORAL
Refills: 0 | DISCHARGE

## 2025-01-15 RX ORDER — POLYETHYLENE GLYCOL 3350, SODIUM SULFATE ANHYDROUS, SODIUM BICARBONATE, SODIUM CHLORIDE, POTASSIUM CHLORIDE 236; 22.74; 6.74; 5.86; 2.97 G/4L; G/4L; G/4L; G/4L; G/4L
4000 POWDER, FOR SOLUTION ORAL ONCE
Refills: 0 | Status: COMPLETED | OUTPATIENT
Start: 2025-01-15 | End: 2025-01-15

## 2025-01-15 RX ADMIN — POLYETHYLENE GLYCOL 3350, SODIUM SULFATE ANHYDROUS, SODIUM BICARBONATE, SODIUM CHLORIDE, POTASSIUM CHLORIDE 4000 MILLILITER(S): 236; 22.74; 6.74; 5.86; 2.97 POWDER, FOR SOLUTION ORAL at 09:02

## 2025-01-15 RX ADMIN — PANTOPRAZOLE 40 MILLIGRAM(S): 40 TABLET, DELAYED RELEASE ORAL at 06:30

## 2025-01-15 RX ADMIN — PANTOPRAZOLE 40 MILLIGRAM(S): 40 TABLET, DELAYED RELEASE ORAL at 21:45

## 2025-01-15 NOTE — PATIENT PROFILE ADULT - FALL HARM RISK - HARM RISK INTERVENTIONS

## 2025-01-15 NOTE — PROGRESS NOTE ADULT - PROBLEM SELECTOR PLAN 1
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 Will order another unit of pRBC  - Give  IV protonix 40mg q12  - Hx/o Afib on Xarelto & ASA, hold AC/AP i/s/o GIB  - endoscopic eval today  - If becomes hemodynamically unstable w/profuse bleeding, consult MICU, stat CTA abm, inform on call GI fellow and consider IR consult

## 2025-01-15 NOTE — CONSULT NOTE ADULT - NS ATTEND AMEND GEN_ALL_CORE FT
None
D/w patient and family role of CATARINA occlusion. Will check CT to assess candidacy for current technology/devices.
Agree with above assessment and plan.

## 2025-01-15 NOTE — CONSULT NOTE ADULT - CONSULT REASON
MAGY with history of AFib on Xarelto    Outpatient Cardiologist Dr. Vasquez/ Dr. Steve Diaz
MICHAEL
GI bleed, anemia.
in ASU:

## 2025-01-15 NOTE — CONSULT NOTE ADULT - SUBJECTIVE AND OBJECTIVE BOX
CHIEF COMPLAINT: "I've been having dark stools and Im off of the Xarelto now"     HISTORY OF PRESENT ILLNESS: 84 y.o Male with pmhx significant for HLD, HTN, HLD, GERD, Afib on Xarelto since August 2024 (last dose 3days prior to admission), nose bleeds on xarelto x 2 requiring ER visit and ENT packing, presents for eval of anemia. Patient reports episode of hematemesis around [place] he was followed up with GI, had endoscopy 1/7 which showed area of bleeding at antrum of stomach which was cauterized. Patient reports persistent dark stool since. Patient was scheduled for follow-up blood work and had a hemoglobin of 6.6 so was ordered for an iron infusion with plan for repeat CBC. 1/14 hemoglobin was lower at 6.2 so patient was referred to the ED. Last colonoscopy approximately 5 years ago.  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 Patient has received 3 units of PRBC thus far.  Currently taking bowel prep, awaiting upper and lower GI scope later today.  Patient denies any prior procedures for his AFib stating he has been asymptomatic of AF.  Last TTE noted in [employer] 11/2023 with EF 60-65%.  Patient currently receiving blood transfusion, daughter and wife at bedside.       Allergies    No Known Allergies    Intolerances      MEDICATIONS:  acetaminophen     Tablet .. 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 Otherwise, 12 point ROS done and otherwise negative.    PHYSICAL EXAM:  T(C): 36.3 (01-15-25 @ 16:40), Max: 37.5 (01-15-25 @ 04:01)  HR: 68 (01-15-25 @ 16:40) (51 - 74)  BP: 142/64 (01-15-25 @ 16:40) (101/63 - 157/74)  RR: 16 (01-15-25 @ 16:40) (16 - 18)  SpO2: 98% (01-15-25 @ 16:40) (97% - 99%)      Appearance: Normal	  HEENT:   Normal oral mucosa, PERRL, EOMI	  Cardiovascular: Normal S1 S2, irregular. No JVD, No murmurs   Respiratory: Lungs clear to auscultation	  Psychiatry: A & O x 3, Mood & affect appropriate  Gastrointestinal:  Soft, Non-tender, + BS	  Skin: No rashes, No ecchymoses, No cyanosis	  Extremities: Normal range of motion, No clubbing, cyanosis. Trace BL/LE edema   Vascular: Peripheral pulses palpable 2+ bilaterally    LABS:	 	    CBC Full  -  ( 15 Liborio 2025 09:23 )  WBC Count : 7.28 K/uL  Hemoglobin : 7.6 g/dL  Hematocrit : 24.5 %  Platelet Count - Automated : 222 K/uL  Mean Cell Volume : 90.1 fl  Mean Cell Hemoglobin : 27.9 pg  Mean Cell Hemoglobin Concentration : 31.0 g/dL  Auto Neutrophil # : x  Auto Lymphocyte # : x  Auto Monocyte # : x  Auto Eosinophil # : x  Auto Basophil # : x  Auto Neutrophil % : x  Auto Lymphocyte % : x  Auto Monocyte % : x  Auto Eosinophil % : x  Auto Basophil % : x    01-15    139  |  108  |  18  ----------------------------<  85  4.2   |  22  |  0.97  01-14    138  |  104  |  26[H]  ----------------------------<  115[H]  4.3   |  22  |  1.06    Ca    8.8      15 Liborio 2025 09:23  Ca    9.4      14 Jan 2025 18:27    TPro  6.3  /  Alb  4.0  /  TBili  0.4  /  DBili  x   /  AST  35  /  ALT  23  /  AlkPhos  41  01-14      TELEMETRY: 	 Not on telemetry   ECG:  	Afib with PAC, PVC's rate 62bpm      TTE:  Patient name: NAVJOT JOHN  YOB: 1940   Age: 83 (M)   MR#: [id number]  Study Date: 3/8/2023  Location: O/PSonographer: Dahlia Pope [facility]  Study quality: Technically good  Referring Physician: Chas Torres MD  Blood Pressure: 119/57 mmHg  Height: 175 cm  Weight: 72 kg  BSA: 1.9 m2  Heart Rhythm: SR w/PVCs, APCs  Heart Rate: 64 mmHg  ------------------------------------------------------------------------  PROCEDURE: Transthoracic echocardiogram with 2-D, M-Mode  and completespectral and color flow Doppler.  INDICATION: Syncope and collapse (R55)  ------------------------------------------------------------------------  Dimensions:    Normal Values:  LA:     4.7    2.0 - 4.0 cm  Ao:     3.8    2.0 - 3.8 cm  SEPTUM: 1.3 0.6 - 1.2 cm  PWT:    1.1    0.6 - 1.1 cm  LVIDd:  4.2    3.0 - 5.6 cm  LVIDs:  2.6    1.8 - 4.0 cm  Derived variables:  LVMI: 95 g/m2  RWT: 0.52  EF (Visual Estimate): 55-60 %  Doppler Peak Velocity (m/sec): TV=2.6  ------------------------------------------------------------------------  Observations:  Mitral Valve: Posterior mitral leaflet prolapse.  Mild  mitral regurgitation directed anteromedially.  Aortic Valve/Aorta: Calcified aortic valve with normal  opening.  Normal aortic root size.  Left Atrium: Mildly dilated left atrium.  Left Ventricle: Normal left ventricular internal dimensions  and wall thicknesses.  Normal left ventricular systolic function. No segmental  wall motion abnormalities.  Normal diastolic function.  Right Heart: Normal right atrium. Normal right ventricular  size and function.  Normal tricuspid valve. Minimal tricuspid regurgitation.  Normal pulmonic valve. Minimal pulmonic regurgitation.  Pericardium/Pleura: Normal pericardium with no pericardial  effusion.  Hemodynamic: IVC is normal. Pulmonary artery pressure is  normal.  ------------------------------------------------------------------------  Conclusions:  Normal left ventricular systolic function. No segmental  wall motion abnormalities.  Posterior mitral leaflet prolapse. Mild mitral  regurgitation directed anteromedially.  ------------------------------------------------------------------------  Confirmed on  3/8/2023 - 17:28:14 by Otis Rasmussen MD, FASE  -----------------------------------

## 2025-01-15 NOTE — PHYSICAL THERAPY INITIAL EVALUATION ADULT - PERTINENT HX OF CURRENT PROBLEM, REHAB EVAL
84y M pmh HTN, HLD, GERD, Afib on Xarelto (last dose 3days ago) presents for eval of anemia. Patient reports episode of hematemesis around [place] he was followed up with GI, had endoscopy 1/7 which showed area of bleeding at antrum of stomach which was cauterized.  Patient reports persistent dark stool since. Patient was scheduled for follow-up blood work and had a hemoglobin of 6.6 so was ordered for an iron infusion with plan for repeat CBC.  Today hemoglobin was lower at 6.2  so patient was referred to the ED.

## 2025-01-15 NOTE — PHYSICAL THERAPY INITIAL EVALUATION ADULT - ADDITIONAL COMMENTS
Pt lives in a house with his wife, 12 steps to enter, 1 flight to bedroom. Pt was independent prior to admission without use of AD. Pt is driving and is very active.

## 2025-01-15 NOTE — CONSULT NOTE ADULT - ASSESSMENT
84 y.o Male with pmhx significant for HLD, HTN, HLD, GERD, Afib on Xarelto since August 2024 (last dose 3days prior to admission), nose bleeds on xarelto x 2 requiring ER visit and ENT packing, presents for eval of anemia. Patient reports episode of hematemesis around [place] he was followed up with GI, had endoscopy 1/7 which showed area of bleeding at antrum of stomach which was cauterized. Patient reports persistent dark stool since. Patient was scheduled for follow-up blood work and had a hemoglobin of 6.6 so was ordered for an iron infusion with plan for repeat CBC. 1/14 hemoglobin was lower at 6.2 so patient was referred to the ED. Last colonoscopy approximately 5 years ago.  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 Patient has received 3 units of PRBC thus far.  Currently taking bowel prep, awaiting upper and lower GI scope later today.  Patient denies any prior procedures for his AFib stating he has been asymptomatic of AF.  Last TTE 11/2023 with EF 60-65%.      1.  Iron Deficiency Anemia  2.  Guaiac positive Stool  3.  Persistent AFib now off of Xarelto  4.  prior Nose bleeds x 2 on Xarelto requiring ER ENT intervention     - Patient is currently receiving 3rd unit of PRBC, awaits GI procedure today upper and lower Endoscopy  - Watchman procedure discussed in detail with patient and family.  Patient states that he has been interested in having Watchman procedure  - Will need to wait for completion of GI workup.  With Watchman procedure, patient will need to be on short term anticoagulation if cleared by GI to do so  - Please obtain CT heart CATARINA protocol to assess for candidacy for Watchman procedure   - Obtain TTE to assess EF/LV    AARON Mccallum [facility]  [phone]

## 2025-01-15 NOTE — PRE PROCEDURE NOTE - PRE PROCEDURE EVALUATION
Pre-Endoscopy Evaluation      Referring Physician:                           Dr. Baugh         Procedure: EGD and colonoscopy.     Indication for Procedure:  GI bleed, anemia.    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 650 milliGRAM(s) Oral every 6 hours PRN Temp greater or equal to 38C (100.4F), Mild Pain (1 - 3)  aluminum hydroxide/magnesium hydroxide/simethicone Suspension 30 milliLiter(s) Oral every 4 hours PRN Dyspepsia  melatonin 3 milliGRAM(s) Oral at bedtime PRN Insomnia  ondansetron Injectable 4 milliGRAM(s) IV Push every 8 hours PRN Nausea and/or Vomiting      Smoking: [ ] yes  [x ] no    AICD/PPM: [ ] yes   [x ] no    Pertinent lab data:                        7.6    7.28  )-----------( 222      ( 15 Liborio 2025 09:23 )             24.5     01-15    139  |  108  |  18  ----------------------------<  85  4.2   |  22  |  0.97    Ca    8.8      15 Liborio 2025 09:23    TPro  6.3  /  Alb  4.0  /  TBili  0.4  /  DBili  x   /  AST  35  /  ALT  23  /  AlkPhos  41  01-14    PT/INR - ( 15 Liborio 2025 09:23 )   PT: 13.0 sec;   INR: 1.13 ratio         PTT - ( 14 Jan 2025 18:27 )  PTT:39.4 sec              Physical Examination:  Daily Height in cm: 175.3 (15 Liborio 2025 16:23)    Daily   Vital Signs Last 24 Hrs  T(C): 36.3 (15 Liborio 2025 16:40), Max: 37.5 (15 Liborio 2025 04:01)  T(F): 97.3 (15 Liborio 2025 16:40), Max: 99.5 (15 Liborio 2025 04:01)  HR: 68 (15 Liborio 2025 16:40) (51 - 74)  BP: 142/64 (15 Liborio 2025 16:40) (101/63 - 157/74)  BP(mean): 76 (15 Liborio 2025 04:01) (75 - 78)  RR: 16 (15 Liborio 2025 16:40) (16 - 18)  SpO2: 98% (15 Liborio 2025 16:40) (97% - 99%)    Parameters below as of 15 Liborio 2025 16:40  Patient On (Oxygen Delivery Method): room air        BP:                 HR:                  SPO2:               Temperature:    Constitutional: NAD    HEENT: PERRLA, EOMI,       Neck:  No JVD    Respiratory: CTAB/L    Cardiovascular: S1 and S2    Gastrointestinal: BS+, soft, NT/ND    Extremities: No peripheral edema    Neurological: A/O x 3, no focal deficits    Psychiatric: Normal mood, normal affect    : No Campbell    Skin: No rashes    Comments:    ASA Class: I [ ]  II [ ]  III [x ]  IV [ ]    The patient is a suitable candidate for the planned procedure unless box checked [ ]  No, explain:

## 2025-01-15 NOTE — CONSULT NOTE ADULT - SUBJECTIVE AND OBJECTIVE BOX
Reason for consult: Anemia    HPI: 84y M pmh HTN, HLD, GERD, Afib on Xarelto (last dose 3days ago) presents for eval of anemia. Patient reports episode of hematemesis around [place] he was followed up with GI, had endoscopy 1/7 which showed area of bleeding at antrum of stomach which was cauterized. Patient reports persistent dark stool since. Patient was scheduled for follow-up blood work and had a hemoglobin of 6.6 so was ordered for an iron infusion with plan for repeat CBC. 1/14 hemoglobin was lower at 6.2 so patient was referred to the ED. Last colonoscopy approximately 5 years ago. S/p one unit pRBC. Patient follows with Dr. Guillen at [facility], seen for an initial visit on 1/13. 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 He started IV Venofer 200 mg x 2 doses on 1/13 and 1/14. 1/13 work up showed iron 48, % sat 17, ferritin 42, TSH 2.1, Haptoglobin 104, Retic count 8.02, B­12 471, Folate normal, .6, SPEP/HELGA negative, , Hgb Electrophoresis no hemoglobin variant.     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      History of appendectomy  1943      History of cataract extraction, right  2012      History of tonsillectomy  1948          FAMILY HISTORY:  Family history of lung cancer (Mother)    Family history of colon cancer (Father)    Family history of testicular cancer (Child)    Family history of breast cancer (Sibling)        Alochol: Denied  Smoking: Nonsmoker  Drug Use: Denied  Marital Status:         Allergies    No Known Allergies    Intolerances        MEDICATIONS  (STANDING):  pantoprazole  Injectable 40 milliGRAM(s) IV Push two times a day    MEDICATIONS  (PRN):  acetaminophen     Tablet .. 650 milliGRAM(s) Oral every 6 hours PRN Temp greater or equal to 38C (100.4F), Mild Pain (1 - 3)  aluminum hydroxide/magnesium hydroxide/simethicone Suspension 30 milliLiter(s) Oral every 4 hours PRN Dyspepsia  melatonin 3 milliGRAM(s) Oral at bedtime PRN Insomnia  ondansetron Injectable 4 milliGRAM(s) IV Push every 8 hours PRN Nausea and/or Vomiting    ROS  No fever, sweats, chills  No epistaxis, HA, sore throat  No CP, SOB, cough, sputum  + melena  No edema  No rash  No anxiety  No back pain, joint pain  No bleeding, bruising  No dysuria, hematuria    T(C): 36.5 (01-15-25 @ 12:18), Max: 37.5 (01-15-25 @ 04:01)  HR: 64 (01-15-25 @ 12:18) (51 - 72)  BP: 121/71 (01-15-25 @ 12:18) (101/63 - 128/86)  RR: 18 (01-15-25 @ 12:18) (17 - 18)  SpO2: 99% (01-15-25 @ 12:18) (96% - 99%)  Wt(kg): --    PE  NAD  Awake, alert  Anicteric, MMM  RRR  CTAB  Abd soft, NT, ND  No c/c/e  No rash grossly  FROM                          7.6    7.28  )-----------( 222      ( 15 Liborio 2025 09:23 )             24.5       01-15    139  |  108  |  18  ----------------------------<  85  4.2   |  22  |  0.97    Ca    8.8      15 Liborio 2025 09:23    TPro  6.3  /  Alb  4.0  /  TBili  0.4  /  DBili  x   /  AST  35  /  ALT  23  /  AlkPhos  41  01-14   Reason for consult: Anemia    HPI: 84y M pmh HTN, HLD, GERD, Afib on Xarelto (last dose 3days ago) presents for eval of anemia. Patient reports episode of hematemesis around [place] he was followed up with GI, had endoscopy 1/7 which showed area of bleeding at antrum of stomach which was cauterized. Patient reports persistent dark stool since. Patient was scheduled for follow-up blood work and had a hemoglobin of 6.6 so was ordered for an iron infusion with plan for repeat CBC. 1/14 hemoglobin was lower at 6.2 so patient was referred to the ED. Last colonoscopy approximately 5 years ago. S/p one unit pRBC. Patient follows with Dr. Guillen at [facility], seen for an initial visit on 1/13. 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 He started IV Venofer 200 mg x 2 doses on 1/13 and 1/14. 1/13 work up showed iron 48, % sat 17, ferritin 42, TSH 2.1, Haptoglobin 104, Retic count 8.02, B­12 471, Folate normal, .6, SPEP/HELGA negative, , Hgb Electrophoresis no hemoglobin variant. Patient seen and examined, family at bedside. Currently taking bowel prep, awaiting scope later today.     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      History of appendectomy  1943      History of cataract extraction, right  2012      History of tonsillectomy  1948          FAMILY HISTORY:  Family history of lung cancer (Mother)    Family history of colon cancer (Father)    Family history of testicular cancer (Child)    Family history of breast cancer (Sibling)        Alochol: Denied  Smoking: Nonsmoker  Drug Use: Denied  Marital Status:         Allergies    No Known Allergies    Intolerances        MEDICATIONS  (STANDING):  pantoprazole  Injectable 40 milliGRAM(s) IV Push two times a day    MEDICATIONS  (PRN):  acetaminophen     Tablet .. 650 milliGRAM(s) Oral every 6 hours PRN Temp greater or equal to 38C (100.4F), Mild Pain (1 - 3)  aluminum hydroxide/magnesium hydroxide/simethicone Suspension 30 milliLiter(s) Oral every 4 hours PRN Dyspepsia  melatonin 3 milliGRAM(s) Oral at bedtime PRN Insomnia  ondansetron Injectable 4 milliGRAM(s) IV Push every 8 hours PRN Nausea and/or Vomiting    ROS  No fever, sweats, chills  No epistaxis, HA, sore throat  No CP, SOB, cough, sputum  + melena  No edema  No rash  No anxiety  No back pain, joint pain  No bleeding, bruising  No dysuria, hematuria    T(C): 36.5 (01-15-25 @ 12:18), Max: 37.5 (01-15-25 @ 04:01)  HR: 64 (01-15-25 @ 12:18) (51 - 72)  BP: 121/71 (01-15-25 @ 12:18) (101/63 - 128/86)  RR: 18 (01-15-25 @ 12:18) (17 - 18)  SpO2: 99% (01-15-25 @ 12:18) (96% - 99%)  Wt(kg): --    PE  NAD  Awake, alert  Anicteric, MMM  RRR  CTAB  Abd soft, NT, ND  No c/c/e  No rash grossly  FROM                          7.6    7.28  )-----------( 222      ( 15 Liborio 2025 09:23 )             24.5       01-15    139  |  108  |  18  ----------------------------<  85  4.2   |  22  |  0.97    Ca    8.8      15 Liborio 2025 09:23    TPro  6.3  /  Alb  4.0  /  TBili  0.4  /  DBili  x   /  AST  35  /  ALT  23  /  AlkPhos  41  01-14

## 2025-01-16 ENCOUNTER — TRANSCRIPTION ENCOUNTER (OUTPATIENT)
Age: 85
End: 2025-01-16

## 2025-01-16 ENCOUNTER — RESULT REVIEW (OUTPATIENT)
Age: 85
End: 2025-01-16

## 2025-01-16 ENCOUNTER — APPOINTMENT (OUTPATIENT)
Dept: INFUSION THERAPY | Facility: HOSPITAL | Age: 85
End: 2025-01-16

## 2025-01-16 VITALS
HEART RATE: 67 BPM | OXYGEN SATURATION: 96 % | SYSTOLIC BLOOD PRESSURE: 143 MMHG | DIASTOLIC BLOOD PRESSURE: 74 MMHG | TEMPERATURE: 99 F | RESPIRATION RATE: 18 BRPM

## 2025-01-16 LAB
ANION GAP SERPL CALC-SCNC: 13 MMOL/L — SIGNIFICANT CHANGE UP (ref 5–17)
BUN SERPL-MCNC: 14 MG/DL — SIGNIFICANT CHANGE UP (ref 7–23)
CALCIUM SERPL-MCNC: 8.5 MG/DL — SIGNIFICANT CHANGE UP (ref 8.4–10.5)
CHLORIDE SERPL-SCNC: 108 MMOL/L — SIGNIFICANT CHANGE UP (ref 96–108)
CO2 SERPL-SCNC: 19 MMOL/L — LOW (ref 22–31)
CREAT SERPL-MCNC: 0.88 MG/DL — SIGNIFICANT CHANGE UP (ref 0.5–1.3)
EGFR: 85 ML/MIN/1.73M2 — SIGNIFICANT CHANGE UP
GLUCOSE SERPL-MCNC: 67 MG/DL — LOW (ref 70–99)
HCT VFR BLD CALC: 28.5 % — LOW (ref 39–50)
HGB BLD-MCNC: 8.7 G/DL — LOW (ref 13–17)
MAGNESIUM SERPL-MCNC: 1.8 MG/DL — SIGNIFICANT CHANGE UP (ref 1.6–2.6)
MCHC RBC-ENTMCNC: 28.3 PG — SIGNIFICANT CHANGE UP (ref 27–34)
MCHC RBC-ENTMCNC: 30.5 G/DL — LOW (ref 32–36)
MCV RBC AUTO: 92.8 FL — SIGNIFICANT CHANGE UP (ref 80–100)
NRBC # BLD: 0 /100 WBCS — SIGNIFICANT CHANGE UP (ref 0–0)
PHOSPHATE SERPL-MCNC: 3.3 MG/DL — SIGNIFICANT CHANGE UP (ref 2.5–4.5)
PLATELET # BLD AUTO: 201 K/UL — SIGNIFICANT CHANGE UP (ref 150–400)
POTASSIUM SERPL-MCNC: 4.3 MMOL/L — SIGNIFICANT CHANGE UP (ref 3.5–5.3)
POTASSIUM SERPL-SCNC: 4.3 MMOL/L — SIGNIFICANT CHANGE UP (ref 3.5–5.3)
RBC # BLD: 3.07 M/UL — LOW (ref 4.2–5.8)
RBC # FLD: 18.9 % — HIGH (ref 10.3–14.5)
SODIUM SERPL-SCNC: 140 MMOL/L — SIGNIFICANT CHANGE UP (ref 135–145)
WBC # BLD: 7.58 K/UL — SIGNIFICANT CHANGE UP (ref 3.8–10.5)
WBC # FLD AUTO: 7.58 K/UL — SIGNIFICANT CHANGE UP (ref 3.8–10.5)

## 2025-01-16 PROCEDURE — 85018 HEMOGLOBIN: CPT

## 2025-01-16 PROCEDURE — 85014 HEMATOCRIT: CPT

## 2025-01-16 PROCEDURE — 75572 CT HRT W/3D IMAGE: CPT | Mod: MC

## 2025-01-16 PROCEDURE — 84295 ASSAY OF SERUM SODIUM: CPT

## 2025-01-16 PROCEDURE — C1889: CPT

## 2025-01-16 PROCEDURE — 86923 COMPATIBILITY TEST ELECTRIC: CPT

## 2025-01-16 PROCEDURE — 83605 ASSAY OF LACTIC ACID: CPT

## 2025-01-16 PROCEDURE — 97161 PT EVAL LOW COMPLEX 20 MIN: CPT

## 2025-01-16 PROCEDURE — 86901 BLOOD TYPING SEROLOGIC RH(D): CPT

## 2025-01-16 PROCEDURE — 86850 RBC ANTIBODY SCREEN: CPT

## 2025-01-16 PROCEDURE — 99232 SBSQ HOSP IP/OBS MODERATE 35: CPT

## 2025-01-16 PROCEDURE — 82330 ASSAY OF CALCIUM: CPT

## 2025-01-16 PROCEDURE — 80048 BASIC METABOLIC PNL TOTAL CA: CPT

## 2025-01-16 PROCEDURE — 85610 PROTHROMBIN TIME: CPT

## 2025-01-16 PROCEDURE — 82803 BLOOD GASES ANY COMBINATION: CPT

## 2025-01-16 PROCEDURE — 85730 THROMBOPLASTIN TIME PARTIAL: CPT

## 2025-01-16 PROCEDURE — 93306 TTE W/DOPPLER COMPLETE: CPT

## 2025-01-16 PROCEDURE — 84100 ASSAY OF PHOSPHORUS: CPT

## 2025-01-16 PROCEDURE — 93306 TTE W/DOPPLER COMPLETE: CPT | Mod: 26

## 2025-01-16 PROCEDURE — 36430 TRANSFUSION BLD/BLD COMPNT: CPT

## 2025-01-16 PROCEDURE — P9016: CPT

## 2025-01-16 PROCEDURE — 86900 BLOOD TYPING SEROLOGIC ABO: CPT

## 2025-01-16 PROCEDURE — 82947 ASSAY GLUCOSE BLOOD QUANT: CPT

## 2025-01-16 PROCEDURE — 99239 HOSP IP/OBS DSCHRG MGMT >30: CPT

## 2025-01-16 PROCEDURE — 83735 ASSAY OF MAGNESIUM: CPT

## 2025-01-16 PROCEDURE — 85025 COMPLETE CBC W/AUTO DIFF WBC: CPT

## 2025-01-16 PROCEDURE — 99285 EMERGENCY DEPT VISIT HI MDM: CPT | Mod: 25

## 2025-01-16 PROCEDURE — 75572 CT HRT W/3D IMAGE: CPT | Mod: 26

## 2025-01-16 PROCEDURE — 85027 COMPLETE CBC AUTOMATED: CPT

## 2025-01-16 PROCEDURE — 84132 ASSAY OF SERUM POTASSIUM: CPT

## 2025-01-16 PROCEDURE — 80053 COMPREHEN METABOLIC PANEL: CPT

## 2025-01-16 PROCEDURE — 82435 ASSAY OF BLOOD CHLORIDE: CPT

## 2025-01-16 RX ORDER — PANTOPRAZOLE 40 MG/1
40 TABLET, DELAYED RELEASE ORAL
Refills: 0 | Status: DISCONTINUED | OUTPATIENT
Start: 2025-01-16 | End: 2025-01-16

## 2025-01-16 RX ORDER — RIVAROXABAN 2.5 MG/1
1 TABLET, FILM COATED ORAL
Refills: 0 | DISCHARGE

## 2025-01-16 RX ORDER — ASPIRIN 81 MG
1 TABLET, DELAYED RELEASE (ENTERIC COATED) ORAL
Refills: 0 | DISCHARGE

## 2025-01-16 RX ORDER — RAMIPRIL 5 MG/1
1 CAPSULE ORAL
Refills: 0 | DISCHARGE

## 2025-01-16 RX ORDER — B COMPLEX, C NO.20/FOLIC ACID 1 MG
1 CAPSULE ORAL
Refills: 0 | DISCHARGE

## 2025-01-16 RX ORDER — IRON SUCROSE 20 MG/ML
200 INJECTION, SOLUTION INTRAVENOUS EVERY 24 HOURS
Refills: 0 | Status: DISCONTINUED | OUTPATIENT
Start: 2025-01-16 | End: 2025-01-16

## 2025-01-16 RX ADMIN — PANTOPRAZOLE 40 MILLIGRAM(S): 40 TABLET, DELAYED RELEASE ORAL at 05:56

## 2025-01-16 RX ADMIN — IRON SUCROSE 110 MILLIGRAM(S): 20 INJECTION, SOLUTION INTRAVENOUS at 18:12

## 2025-01-16 RX ADMIN — PANTOPRAZOLE 40 MILLIGRAM(S): 40 TABLET, DELAYED RELEASE ORAL at 18:12

## 2025-01-16 NOTE — DISCHARGE NOTE NURSING/CASE MANAGEMENT/SOCIAL WORK - PATIENT PORTAL LINK FT
You can access the FollowMyHealth Patient Portal offered by [facility] by registering at the following website: [email or web address]. By joining [facility] FollowMyHealth portal, you will also be able to view your health information using other applications (apps) compatible with our system.

## 2025-01-16 NOTE — DISCHARGE NOTE NURSING/CASE MANAGEMENT/SOCIAL WORK - NSDCPEFALRISK_GEN_ALL_CORE
For information on Fall & Injury Prevention, visit: [email or web address] OR  [email or web address] OR  https://www.cdc.gov/steadi/patient.html

## 2025-01-16 NOTE — DISCHARGE NOTE NURSING/CASE MANAGEMENT/SOCIAL WORK - NSDCVIVACCINE_GEN_ALL_CORE_FT
influenza, injectable, quadrivalent, preservative free; 21-Sep-2018 12:52; Vandana Wright (RN); [employer]; [id number] (Exp. Date: 30-Jun-2019); IntraMuscular; Deltoid Right.; 0.5 milliLiter(s); VIS (VIS Published: 07-Aug-2015, VIS Presented: 21-Sep-2018);

## 2025-01-16 NOTE — DISCHARGE NOTE PROVIDER - NSDCCPTREATMENT_GEN_ALL_CORE_FT
PRINCIPAL PROCEDURE  Procedure: Upper gi endoscopy  Findings and Treatment: Impression:          - Normal esophagus.                       - Dieulafoy lesion of stomach successful treated with Gold probe cautery and                        endoclips.                       - Multiple gastric polyps, likely fundic gland polyps.                       - Normal examined duodenum.                       - No specimens collected.  Recommendation:      - Observe patient in his hospital room for ongoing care.                       - Full liquid diet.                       - Advance diet as tolerated.                       - Continue present medications.                       - Return to GI office in 1 week.        SECONDARY PROCEDURE  Procedure: CT heart w con  Findings and Treatment: Findings:  Left atrial appendage: No filling defect is visualized.  Pulmonary vein anatomy: There are 4 pumonary veins; 2 on the left, and 2   on the right.  Cardiac Morphology: The right and left atria and ventricles are   morphologically normal. Coronary artery calcifications present.  Valves: Normal CT appearance of the aortic and mitral valves.  Pericardium: Normal pericardial thickness. No pericardial effusion or   calcification.  Aorta: Left sided aortic arch.  Noncardiac Findings:  *  Lungs/pleura: Trace bilateral pleural effusions. Linear consolidations   in the bilateral lower lobes likely reflect atelectasis. Few stable   subcentimeter pulmonary nodules including a 3 mm subpleural right lower   lobe nodule (series 16, image 130) and 0.6 cm left lower lobe nodule   (series 16, image 141).  *  Upper abdomen: Few subcentimeter hepatic hypodensities are too small   to accurately characterize.  *  Bones: Stable L1 compression deformity.  IMPRESSION:  No filling defect is seen in the left atrial appendage.  Trace bilateral pleural effusions.  --- End of Report ---    Procedure: Complete transthoracic echocardiography (TTE)  Findings and Treatment: CONCLUSIONS:      1. Left ventricular cavity is normal in size. Left ventricular wall thickness is normal. Left ventricular systolic function is normal with an ejection fraction of 60 % by Hillman's method of disks. There are no regional wall motion abnormalities seen.   2. The left ventricular diastolic function is indeterminate, with normal left ventricular filling pressure.   3. Left atrium is severely dilated.   4. Prolapse of both mitral leaflets.   5. Moderate mitral regurgitation at a blood pressure of 129/77 mmHg.   6. 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   7. Normal right ventricular cavity size, with normal wall thickness, and normal right ventricular systolic function.   8. No pericardial effusion seen.   9. Compared to the transthoracic echocardiogram performed on 11/6/2023, the ascending aorta diamete is now 4.5cm.

## 2025-01-16 NOTE — DISCHARGE NOTE PROVIDER - NSDCMRMEDTOKEN_GEN_ALL_CORE_FT
pantoprazole 40 mg oral delayed release tablet: 1 tab(s) orally 2 times a day  simvastatin 20 mg oral tablet: 1 tab(s) orally once a day (at bedtime)

## 2025-01-16 NOTE — DISCHARGE NOTE NURSING/CASE MANAGEMENT/SOCIAL WORK - FINANCIAL ASSISTANCE
[facility] provides services at a reduced cost to those who are determined to be eligible through [facility]’s financial assistance program. Information regarding [facility]’s financial assistance program can be found by going to [email or web address] or by calling [phone].

## 2025-01-16 NOTE — DISCHARGE NOTE PROVIDER - NSDCFUSCHEDAPPT_GEN_ALL_CORE_FT
Steve Diaz  [facility] Physician [facility]  CARDIOLOGY [address] [facility]   Scheduled Appointment: 02/10/2025

## 2025-01-16 NOTE — DISCHARGE NOTE PROVIDER - HOSPITAL COURSE
HPI:  84y M pmh HTN, HLD, GERD, Afib on Xarelto (last dose 3days ago) presents for eval of anemia. Patient reports episode of hematemesis around [place] he was followed up with GI, had endoscopy 1/7 which showed area of bleeding at antrum of stomach which was cauterized.  Patient reports persistent dark stool since. Patient was scheduled for follow-up blood work and had a hemoglobin of 6.6 so was ordered for an iron infusion with plan for repeat CBC.  Today hemoglobin was lower at 6.2  so patient was referred to the ED.  Patient reports feeling mild fatigue. Last colonoscopy approximately 5 years ago.    ROS: Denies HA, CP, SOB, palpitation, N/V/D, fever, cough, chills, dizziness, abm pain, change in urinary habits   A 10-system ROS was performed and is negative except as noted above and/or in the HPI.    ED: had blood work, 1U PRBC         (14 Jan 2025 21:27)    Hospital Course:  The patient received a total of 3 units of pRBC during hospitalization. EGD showed a Dieulafoy's lesion in the fundus which was successfully treated with cauterization and endoclipping. EP was consulted and CT heart and TTE were completed to assess candidacy for CATARINA occlusion.. Further assessment to be done outpatient. The patient is stable for discharge home with outpatient GI, EP, and heme follow up.      Important Medication Changes and Reason:  - stop aspirin  - stop xarelto  - stop amlodipine and ramipril, given soft blood pressures    Active or Pending Issues Requiring Follow-up:    Advanced Directives:   [ ] Full code  [ ] DNR  [ ] Hospice    Discharge Diagnoses:

## 2025-01-16 NOTE — PROGRESS NOTE ADULT - NS ATTEND OPT1 GEN_ALL_CORE
I independently performed the documented:
I attest my time as attending is greater than 50% of the total combined time spent on qualifying patient care activities by the PA/NP and attending.
I attest my time as attending is greater than 50% of the total combined time spent on qualifying patient care activities by the PA/NP and attending.
I independently performed the documented:

## 2025-01-16 NOTE — DISCHARGE NOTE PROVIDER - ATTENDING DISCHARGE PHYSICAL EXAMINATION:
CONSTITUTIONAL: Well-developed, well-groomed, in no apparent distress  EYES: No conjunctival or scleral injection, non-icteric  RESPIRATORY: Breathing comfortably on room air  PSYCHIATRIC: A+O x 4

## 2025-01-16 NOTE — DISCHARGE NOTE PROVIDER - PROVIDER TOKENS
PROVIDER:[TOKEN:[[id number]],FOLLOWUP:[1 month],ESTABLISHEDPATIENT:[T]] Information: Selecting Yes will display possible errors in your note based on the variables you have selected. This validation is only offered as a suggestion for you. PLEASE NOTE THAT THE VALIDATION TEXT WILL BE REMOVED WHEN YOU FINALIZE YOUR NOTE. IF YOU WANT TO FAX A PRELIMINARY NOTE YOU WILL NEED TO TOGGLE THIS TO 'NO' IF YOU DO NOT WANT IT IN YOUR FAXED NOTE.

## 2025-01-16 NOTE — DISCHARGE NOTE PROVIDER - CARE PROVIDER_API CALL
Darian Guillen  Hematology  [address]  [place] [address]  Phone: [phone]  Fax: [phone]  Established Patient  Follow Up Time: 1 month

## 2025-01-16 NOTE — PROGRESS NOTE ADULT - NS ATTEND AMEND GEN_ALL_CORE FT
None
None
Agree with above assessment and plan.   If remaining inpatient can give IV iron venofer as outlined above. If discharged can follow up with Dr Guillen for further IV iron and management.
follow up CT to assess candidacy for CATARINA occlusion

## 2025-01-16 NOTE — PROGRESS NOTE ADULT - ASSESSMENT
84 y.o Male with pmhx significant for HLD, HTN, HLD, GERD, Afib on Xarelto since August 2024 (last dose 3days prior to admission), nose bleeds on xarelto x 2 requiring ER visit and ENT packing, presents for eval of anemia. Patient reports episode of hematemesis around [place] he was followed up with GI, had endoscopy 1/7 which showed area of bleeding at antrum of stomach which was cauterized. Patient reports persistent dark stool since. Patient was scheduled for follow-up blood work and had a hemoglobin of 6.6 so was ordered for an iron infusion with plan for repeat CBC. 1/14 hemoglobin was lower at 6.2 so patient was referred to the ED. Last colonoscopy approximately 5 years ago.  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 Patient has received 3 units of PRBC thus far.  Currently taking bowel prep, awaiting upper and lower GI scope later today.  Patient denies any prior procedures for his AFib stating he has been asymptomatic of AF.  Last TTE 11/2023 with EF 60-65%.      - patient pending outpatient capsule endoscopy next week   - patient amenable to watchman implant as outpatient   - pending CT today to assess candidacy for LAAO   - pending TTE   - appreciate GI recs     
84y M pmh HTN, HLD, GERD, Afib on Xarelto (last dose 3days ago) presents for eval of anemia a/f acute blood loss anemia   
84y M pmh HTN, HLD, GERD, Afib on Xarelto (last dose 3days ago) presents for eval of anemia. Patient reports episode of hematemesis around [place] he was followed up with GI, had endoscopy 1/7 which showed area of bleeding at antrum of stomach which was cauterized. Patient reports persistent dark stool since. Patient was scheduled for follow-up blood work and had a hemoglobin of 6.6 so was ordered for an iron infusion with plan for repeat CBC. 1/14 hemoglobin was lower at 6.2 so patient was referred to the ED. Last colonoscopy approximately 5 years ago.     Iron deficiency anemia  - Patient follows with Dr. Guillen at [facility], seen for an initial visit on 1/13. 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   - 1/13 work up showed iron 48, % sat 17, ferritin 42, TSH 2.1, Haptoglobin 104, Retic count 8.02, B­12 471, Folate normal, .6, SPEP/HELGA negative, , Hgb Electrophoresis no hemoglobin variant.   - He started IV Venofer 200 mg x 2 doses on 1/13 and 1/14. Will give additional Venofer inpatient, and he may resume remaining doses as outpatient.  - S/p 3 units pRBC, hemoglobin up to 8.7  - S/p EGD and colonoscopy 1/15, EGD w/ Dieulafoy's lesion in the fundus, cauterized; Colonoscopy showed diverticulosis.    - Plan for outpatient capsule endoscopy next week  - Recommend transfusion for hemoglobin < 7  - Cleared for d/c from a heme perspective  - pending CT to assess candidacy for LAAO     Will continue to follow.    Mitchell Chris PA-C  Hematology/Oncology  [facility]  [phone] (office)  Evenings and weekends please call MD on call or office
84 y.o. male with Afib, HTN, hyperlipidemia, GERD, now with iron deficiency anemia and GI bleeding.  He is usually on aspirin and Xarelto, and his Cardiologist is Dr. Steve Diaz.  About 10 days ago, he had one episode of coffee ground emesis which he attributed to a possible "stomach flu" after eating at a restaurant.  Since then, he has dark, guaiac positive stool.  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  Over the past 2-3 weeks, his Hgb has dropped from 11.5 to 7.  Yesterday, his Hgb was 6.6 at the Hematologist, Dr. Darian Guillen, and he received IV iron.  Then, today his repeat Hgb was 6.2 with Dr. Darian Guillen.  His Hgb has been dropping even while he stopped taking Xarelto and aspirin.  The patient was recently seen in the ER for nose bleed, and he temporarily stopped Xarelto at that time.  His last colonoscopy was done 4 years ago, and was unremarkable.     - NPO except for bowel prep.   - Plan for EGD and colonoscopy today, likely at 5 pm or later.  I am concerned the patient has a Dieulafoy's lesion or possible lower GI source of guaiac positive stool and anemia.   - Golytely 4 liters po now over 4 hours for bowel prep.   - Blood transfusion prn.   - Serial H/H.   - Hold Xarelto and aspirin for now.   - I called Dr. Steve Diaz, Cardiology to discuss the case.  - I spoke to Endoscopy Unit to schedule EGD / colonoscopy for today at 5 pm or later.   - I reviewed labs including CBC. 
84 y.o. male with Afib, HTN, hyperlipidemia, GERD, now with iron deficiency anemia and GI bleeding.  He is usually on aspirin and Xarelto, and his Cardiologist is Dr. Steve Diaz.  About 10 days ago, he had one episode of coffee ground emesis which he attributed to a possible "stomach flu" after eating at a restaurant.  Since then, he has dark, guaiac positive stool.  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  Over the past 2-3 weeks, his Hgb has dropped from 11.5 to 7.  Yesterday, his Hgb was 6.6 at the Hematologist, Dr. Darian Guillen, and he received IV iron.  Then, today his repeat Hgb was 6.2 with Dr. Darian Guillen.  His Hgb has been dropping even while he stopped taking Xarelto and aspirin.  The patient was recently seen in the ER for nose bleed, and he temporarily stopped Xarelto at that time.  His last colonoscopy was done 4 years ago, and was unremarkable. The patient tolerated the bowel prep yesterday, and had clear output with the bowel prep.  EGD showed a Dieulafoy's lesion in the fundus which was successfully treated with cauterization and endoclipping.  Colonoscopy showed an excellent bowel prep with diverticulosis.  He tolerated a full liquid diet.  Currently, no melena or rectal bleeding.  Hgb/HCT pending today.     - Hematology notes reviewed.  - Cardiology notes reviewed.  - Awaiting cardiac CT scan to assess anatomy for possible future Watchman procedure.    - Plan for outpatient capsule endoscopy next week to rule out any small bowel lesions that could increase risk of GI bleeding while on anti-coagulation in the future.   - Blood transfusion prn.   - Serial H/H.   - Continue to hold Xarelto and aspirin for now.   - Advance to regular diet.   - OK for discharge planning from a GI perspective as long as Hgb/HCT stable, an patient has no signs or symptoms of recurrent GI bleeding.   - OK to change to protonix 40mg po bid.

## 2025-01-16 NOTE — PROGRESS NOTE ADULT - NSPROGADDITIONALINFOA_GEN_ALL_CORE
Savage Baugh MD, FACG, [facility]  [phone]     CPT 18741  40 minutes spent face-to-face, reviewing records, coordinating care.
Savage Baugh MD, FACG, [facility]  [phone]     CPT 04336  40 minutes spent face-to-face, reviewing records, coordinating care.

## 2025-01-16 NOTE — DISCHARGE NOTE PROVIDER - NSFOLLOWUPCLINICS_GEN_ALL_ED_FT
[facility]  Gastroenterology  [address] [place] [id number]  Phone: [phone]  Fax:   Follow Up Time: 1 week

## 2025-01-16 NOTE — DISCHARGE NOTE PROVIDER - NSDCCPCAREPLAN_GEN_ALL_CORE_FT
PRINCIPAL DISCHARGE DIAGNOSIS  Diagnosis: Dieulafoy lesion of stomach  Assessment and Plan of Treatment: Caused bleeding. This was treated with cautery and endoclips. Make sure you take protonix twice a day. Stop taking your blood thinners.        SECONDARY DISCHARGE DIAGNOSES  Diagnosis: Rectal bleeding  Assessment and Plan of Treatment:

## 2025-01-16 NOTE — PROGRESS NOTE ADULT - SUBJECTIVE AND OBJECTIVE BOX
Edith Farias MD  Hospitalist  Available on MS Teams      PROGRESS NOTE:     Patient is a 84y old  Male who presents with a chief complaint of GIB, anemia (15 Liborio 2025 12:25)      SUBJECTIVE / OVERNIGHT EVENTS:  Pt seen with his daughter at bedside. He has no acute complaints. Was drinking golytly, BMs are liquid but still brown as of this morning. No further hematemesis.     Hgb this morning 7.6. Will give another 1 unit of pRBC.    MEDICATIONS  (STANDING):  pantoprazole  Injectable 40 milliGRAM(s) IV Push two times a day    MEDICATIONS  (PRN):  acetaminophen     Tablet .. 650 milliGRAM(s) Oral every 6 hours PRN Temp greater or equal to 38C (100.4F), Mild Pain (1 - 3)  aluminum hydroxide/magnesium hydroxide/simethicone Suspension 30 milliLiter(s) Oral every 4 hours PRN Dyspepsia  melatonin 3 milliGRAM(s) Oral at bedtime PRN Insomnia  ondansetron Injectable 4 milliGRAM(s) IV Push every 8 hours PRN Nausea and/or Vomiting      CAPILLARY BLOOD GLUCOSE        I&O's Summary      PHYSICAL EXAM:  Vital Signs Last 24 Hrs  T(C): 36.5 (15 Liborio 2025 12:18), Max: 37.5 (15 Liborio 2025 04:01)  T(F): 97.7 (15 Liborio 2025 12:18), Max: 99.5 (15 Liborio 2025 04:01)  HR: 64 (15 Liborio 2025 12:18) (51 - 72)  BP: 121/71 (15 Liborio 2025 12:18) (101/63 - 128/86)  BP(mean): 76 (15 Liborio 2025 04:01) (75 - 78)  RR: 18 (15 Liborio 2025 12:18) (17 - 18)  SpO2: 99% (15 Liborio 2025 12:18) (97% - 99%)    Parameters below as of 15 Liborio 2025 12:18  Patient On (Oxygen Delivery Method): room air      CONSTITUTIONAL: Well-developed, well-groomed, in no apparent distress  EYES: No conjunctival or scleral injection, non-icteric  RESPIRATORY: Breathing comfortably on room air  CARDIOVASCULAR: +S1S2, RRR, no murmur; no lower extremity edema  GASTROINTESTINAL: soft, nontender, nondistended  NEUROLOGIC: normal gait  PSYCHIATRIC: A+O x 4    LABS:                        7.6    7.28  )-----------( 222      ( 15 Liborio 2025 09:23 )             24.5     01-15    139  |  108  |  18  ----------------------------<  85  4.2   |  22  |  0.97    Ca    8.8      15 Liborio 2025 09:23    TPro  6.3  /  Alb  4.0  /  TBili  0.4  /  DBili  x   /  AST  35  /  ALT  23  /  AlkPhos  41  01-14    PT/INR - ( 15 Liborio 2025 09:23 )   PT: 13.0 sec;   INR: 1.13 ratio         PTT - ( 14 Jan 2025 18:27 )  PTT:39.4 sec      Urinalysis Basic - ( 15 Liborio 2025 09:23 )    Color: x / Appearance: x / SG: x / pH: x  Gluc: 85 mg/dL / Ketone: x  / Bili: x / Urobili: x   Blood: x / Protein: x / Nitrite: x   Leuk Esterase: x / RBC: x / WBC x   Sq Epi: x / Non Sq Epi: x / Bacteria: x      
Patient is a 84y old  Male who presents with a chief complaint of GIB, anemia (16 Jan 2025 09:58)    Patient seen and examined. S/p scope yesterday, GI to perform capsule as OP.     MEDICATIONS  (STANDING):  pantoprazole    Tablet 40 milliGRAM(s) Oral two times a day  sodium chloride 0.9%. 500 milliLiter(s) (75 mL/Hr) IV Continuous <Continuous>    MEDICATIONS  (PRN):  acetaminophen     Tablet .. 650 milliGRAM(s) Oral every 6 hours PRN Temp greater or equal to 38C (100.4F), Mild Pain (1 - 3)  aluminum hydroxide/magnesium hydroxide/simethicone Suspension 30 milliLiter(s) Oral every 4 hours PRN Dyspepsia  melatonin 3 milliGRAM(s) Oral at bedtime PRN Insomnia  ondansetron Injectable 4 milliGRAM(s) IV Push every 8 hours PRN Nausea and/or Vomiting    Vital Signs Last 24 Hrs  T(C): 36.5 (16 Jan 2025 05:20), Max: 36.9 (15 Liborio 2025 16:23)  T(F): 97.7 (16 Jan 2025 05:20), Max: 98.4 (15 Liborio 2025 16:23)  HR: 56 (16 Jan 2025 05:20) (56 - 74)  BP: 129/77 (16 Jan 2025 05:20) (111/61 - 157/74)  BP(mean): 76 (15 Liborio 2025 17:34) (76 - 76)  RR: 18 (16 Jan 2025 05:20) (14 - 18)  SpO2: 97% (16 Jan 2025 05:20) (96% - 99%)    Parameters below as of 16 Jan 2025 05:20  Patient On (Oxygen Delivery Method): room air        PE  NAD  Awake, alert  Anicteric, MMM  RRR  CTAB  Abd soft, NT, ND  No c/c/e  No rash grossly                          8.7    7.58  )-----------( 201      ( 16 Jan 2025 07:06 )             28.5       01-16    140  |  108  |  14  ----------------------------<  67[L]  4.3   |  19[L]  |  0.88    Ca    8.5      16 Jan 2025 07:06  Phos  3.3     01-16  Mg     1.8     01-16    TPro  6.3  /  Alb  4.0  /  TBili  0.4  /  DBili  x   /  AST  35  /  ALT  23  /  AlkPhos  41  01-14      
Patient is a 84y old  Male who presents with a chief complaint of GIB, anemia (14 Jan 2025 21:27)      INTERVAL HPI/OVERNIGHT EVENTS:  Patient received blood transfusion.  He tolerated a clear liquid diet.  No hematemesis.  He denies any abdominal pain.  No fever.  He is receiving IV protonix bid.    MEDICATIONS  (STANDING):  pantoprazole  Injectable 40 milliGRAM(s) IV Push two times a day    MEDICATIONS  (PRN):  acetaminophen     Tablet .. 650 milliGRAM(s) Oral every 6 hours PRN Temp greater or equal to 38C (100.4F), Mild Pain (1 - 3)  aluminum hydroxide/magnesium hydroxide/simethicone Suspension 30 milliLiter(s) Oral every 4 hours PRN Dyspepsia  melatonin 3 milliGRAM(s) Oral at bedtime PRN Insomnia  ondansetron Injectable 4 milliGRAM(s) IV Push every 8 hours PRN Nausea and/or Vomiting      Allergies    No Known Allergies    Intolerances        Review of Systems:    General:  No wt loss, fevers, chills, night sweats,fatigue,   ENT:  No sore throat, pain, runny nose, dysphagia  CV:  No pain, palpitatioins, hypo/hypertension  Resp:  No dyspnea, cough, tachypnea, wheezing  Neuro:  No weakness, tingling, memory problems  Heme:  No petechiae, ecchymosis, easy bruisability  Otherwise 10 point ROS negative    Vital Signs Last 24 Hrs  T(C): 36.5 (15 Liborio 2025 06:28), Max: 37.5 (15 Liborio 2025 04:01)  T(F): 97.7 (15 Liborio 2025 06:28), Max: 99.5 (15 Liborio 2025 04:01)  HR: 55 (15 Liborio 2025 06:28) (51 - 72)  BP: 108/71 (15 Liborio 2025 06:28) (101/63 - 128/86)  BP(mean): 76 (15 Liborio 2025 04:01) (75 - 78)  RR: 18 (15 Liborio 2025 06:28) (17 - 18)  SpO2: 97% (15 Liborio 2025 06:28) (96% - 99%)    Parameters below as of 15 Liborio 2025 06:28  Patient On (Oxygen Delivery Method): room air        PHYSICAL EXAM:    Constitutional: NAD, well-developed  Neck: No LAD, supple  Respiratory: clear to auscultation b/l no rales, rhonchi, wheezing  Cardiovascular: S1 and S2, RRR, no murmur  Gastrointestinal: +BS x4, soft, NT/ND, neg HSM,  Extremities: No peripheral edema, neg clubbing, cyanosis  Vascular: 2+ peripheral pulses  Neurological: A/O x 3, no focal deficits  Psychiatric: Normal mood, normal affect  Skin: No rashes    LABS:                        7.0    9.79  )-----------( 276      ( 14 Jan 2025 18:27 )             23.3     01-14    138  |  104  |  26[H]  ----------------------------<  115[H]  4.3   |  22  |  1.06    Ca    9.4      14 Jan 2025 18:27    TPro  6.3  /  Alb  4.0  /  TBili  0.4  /  DBili  x   /  AST  35  /  ALT  23  /  AlkPhos  41  01-14    PT/INR - ( 14 Jan 2025 18:27 )   PT: 12.9 sec;   INR: 1.13 ratio         PTT - ( 14 Jan 2025 18:27 )  PTT:39.4 sec  Urinalysis Basic - ( 14 Jan 2025 18:27 )    Color: x / Appearance: x / SG: x / pH: x  Gluc: 115 mg/dL / Ketone: x  / Bili: x / Urobili: x   Blood: x / Protein: x / Nitrite: x   Leuk Esterase: x / RBC: x / WBC x   Sq Epi: x / Non Sq Epi: x / Bacteria: x      LIVER FUNCTIONS - ( 14 Jan 2025 18:27 )  Alb: 4.0 g/dL / Pro: 6.3 g/dL / ALK PHOS: 41 U/L / ALT: 23 U/L / AST: 35 U/L / GGT: x             RADIOLOGY & ADDITIONAL TESTS:    
Patient seen and examined at bedside.    Overnight Events: No acute events     Current Meds:  acetaminophen     Tablet .. 650 milliGRAM(s) Oral every 6 hours PRN  aluminum hydroxide/magnesium hydroxide/simethicone Suspension 30 milliLiter(s) Oral every 4 hours PRN  melatonin 3 milliGRAM(s) Oral at bedtime PRN  ondansetron Injectable 4 milliGRAM(s) IV Push every 8 hours PRN  pantoprazole    Tablet 40 milliGRAM(s) Oral two times a day  sodium chloride 0.9%. 500 milliLiter(s) IV Continuous <Continuous>      Vitals:  T(F): 97.7 (01-16), Max: 98.4 (01-15)  HR: 56 (01-16) (56 - 74)  BP: 129/77 (01-16) (111/61 - 157/74)  RR: 18 (01-16)  SpO2: 97% (01-16)  I&O's Summary      Physical Exam:  Appearance: No acute distress; well appearing  Eyes: PERRL, EOMI, pink conjunctiva  HEENT: Normal oral mucosa  Cardiovascular: RRR, S1, S2, no murmurs, rubs, or gallops; no edema; no JVD  Respiratory: Clear to auscultation bilaterally  Gastrointestinal: soft, non-tender, non-distended with normal bowel sounds  Musculoskeletal: No clubbing; no joint deformity   Neurologic: Non-focal  Lymphatic: No lymphadenopathy  Psychiatry: AAOx3, mood & affect appropriate  Skin: No rashes, ecchymoses, or cyanosis                          8.7    7.58  )-----------( 201      ( 16 Jan 2025 07:06 )             28.5     01-16    140  |  108  |  14  ----------------------------<  67[L]  4.3   |  19[L]  |  0.88    Ca    8.5      16 Jan 2025 07:06  Phos  3.3     01-16  Mg     1.8     01-16    TPro  6.3  /  Alb  4.0  /  TBili  0.4  /  DBili  x   /  AST  35  /  ALT  23  /  AlkPhos  41  01-14    PT/INR - ( 15 Liborio 2025 09:23 )   PT: 13.0 sec;   INR: 1.13 ratio         PTT - ( 14 Jan 2025 18:27 )  PTT:39.4 sec       
Patient is a 84y old  Male who presents with a chief complaint of GIB, anemia (14 Jan 2025 21:27)      INTERVAL HPI/OVERNIGHT EVENTS:  The patient tolerated the bowel prep yesterday, and had clear output with the bowel prep.  EGD showed a Dieulafoy's lesion in the fundus which was successfully treated with cauterization and endoclipping.  Colonoscopy showed an excellent bowel prep with diverticulosis.  He tolerated a full liquid diet.  Currently, no melena or rectal bleeding.  Hgb/HCT pending today.     MEDICATIONS  (STANDING):  pantoprazole  Injectable 40 milliGRAM(s) IV Push two times a day    MEDICATIONS  (PRN):  acetaminophen     Tablet .. 650 milliGRAM(s) Oral every 6 hours PRN Temp greater or equal to 38C (100.4F), Mild Pain (1 - 3)  aluminum hydroxide/magnesium hydroxide/simethicone Suspension 30 milliLiter(s) Oral every 4 hours PRN Dyspepsia  melatonin 3 milliGRAM(s) Oral at bedtime PRN Insomnia  ondansetron Injectable 4 milliGRAM(s) IV Push every 8 hours PRN Nausea and/or Vomiting      Allergies    No Known Allergies    Intolerances        Review of Systems:    General:  No wt loss, fevers, chills, night sweats,fatigue,   ENT:  No sore throat, pain, runny nose, dysphagia  CV:  No pain, palpitatioins, hypo/hypertension  Resp:  No dyspnea, cough, tachypnea, wheezing  Neuro:  No weakness, tingling, memory problems  Heme:  No petechiae, ecchymosis, easy bruisability  Otherwise 10 point ROS negative    T(F): 97.7 (01-16-25 @ 05:20), Max: 98.4 (01-15-25 @ 16:23)  HR: 56 (01-16-25 @ 05:20) (56 - 74)  BP: 129/77 (01-16-25 @ 05:20) (111/61 - 157/74)  RR: 18 (01-16-25 @ 05:20) (14 - 18)  SpO2: 97% (01-16-25 @ 05:20) (96% - 99%)  Wt(kg): --  ,   I&O's Summary    PHYSICAL EXAM:    Constitutional: NAD, well-developed  Neck: No LAD, supple  Respiratory: clear to auscultation b/l no rales, rhonchi, wheezing  Cardiovascular: S1 and S2, RRR, no murmur  Gastrointestinal: +BS x4, soft, NT/ND, neg HSM,  Extremities: No peripheral edema, neg clubbing, cyanosis  Vascular: 2+ peripheral pulses  Neurological: A/O x 3, no focal deficits  Psychiatric: Normal mood, normal affect  Skin: No rashes    LABS:                                    7.6    7.28  )-----------( 222      ( 15 Liborio 2025 09:23 )             24.5               01-15    139  |  108  |  18  ----------------------------<  85  4.2   |  22  |  0.97    Ca    8.8      15 Liborio 2025 09:23    TPro  6.3  /  Alb  4.0  /  TBili  0.4  /  DBili  x   /  AST  35  /  ALT  23  /  AlkPhos  41  01-14    PT/INR - ( 15 Liborio 2025 09:23 )   PT: 13.0 sec;   INR: 1.13 ratio         PTT - ( 14 Jan 2025 18:27 )  PTT:39.4 sec                   Urinalysis Basic - ( 15 Liborio 2025 09:23 )    Color: x / Appearance: x / SG: x / pH: x  Gluc: 85 mg/dL / Ketone: x  / Bili: x / Urobili: x   Blood: x / Protein: x / Nitrite: x   Leuk Esterase: x / RBC: x / WBC x   Sq Epi: x / Non Sq Epi: x / Bacteria: x

## 2025-02-06 ENCOUNTER — APPOINTMENT (OUTPATIENT)
Dept: OTOLARYNGOLOGY | Facility: CLINIC | Age: 85
End: 2025-02-06
Payer: MEDICARE

## 2025-02-06 VITALS
WEIGHT: 162 LBS | BODY MASS INDEX: 23.92 KG/M2 | HEART RATE: 61 BPM | DIASTOLIC BLOOD PRESSURE: 82 MMHG | SYSTOLIC BLOOD PRESSURE: 130 MMHG | TEMPERATURE: 98 F

## 2025-02-06 DIAGNOSIS — J34.2 DEVIATED NASAL SEPTUM: ICD-10-CM

## 2025-02-06 PROCEDURE — 30903 CONTROL OF NOSEBLEED: CPT | Mod: RT

## 2025-02-06 PROCEDURE — 99214 OFFICE O/P EST MOD 30 MIN: CPT | Mod: 25

## 2025-02-10 ENCOUNTER — APPOINTMENT (OUTPATIENT)
Dept: CARDIOLOGY | Facility: CLINIC | Age: 85
End: 2025-02-10
Payer: MEDICARE

## 2025-02-10 VITALS
DIASTOLIC BLOOD PRESSURE: 80 MMHG | HEART RATE: 78 BPM | SYSTOLIC BLOOD PRESSURE: 120 MMHG | WEIGHT: 161 LBS | BODY MASS INDEX: 23.78 KG/M2 | OXYGEN SATURATION: 98 %

## 2025-02-10 DIAGNOSIS — I10 ESSENTIAL (PRIMARY) HYPERTENSION: ICD-10-CM

## 2025-02-10 DIAGNOSIS — E78.00 PURE HYPERCHOLESTEROLEMIA, UNSPECIFIED: ICD-10-CM

## 2025-02-10 DIAGNOSIS — Z00.00 ENCOUNTER FOR GENERAL ADULT MEDICAL EXAMINATION W/OUT ABNORMAL FINDINGS: ICD-10-CM

## 2025-02-10 DIAGNOSIS — I48.0 PAROXYSMAL ATRIAL FIBRILLATION: ICD-10-CM

## 2025-02-10 PROCEDURE — G2211 COMPLEX E/M VISIT ADD ON: CPT

## 2025-02-10 PROCEDURE — 99214 OFFICE O/P EST MOD 30 MIN: CPT

## 2025-02-12 RX ORDER — APIXABAN 5 MG/1
5 TABLET, FILM COATED ORAL
Qty: 180 | Refills: 3 | Status: ACTIVE | COMMUNITY

## 2025-02-13 ENCOUNTER — OUTPATIENT (OUTPATIENT)
Dept: OUTPATIENT SERVICES | Facility: HOSPITAL | Age: 85
LOS: 1 days | End: 2025-02-13
Payer: MEDICARE

## 2025-02-13 ENCOUNTER — APPOINTMENT (OUTPATIENT)
Dept: OTOLARYNGOLOGY | Facility: CLINIC | Age: 85
End: 2025-02-13
Payer: MEDICARE

## 2025-02-13 VITALS
SYSTOLIC BLOOD PRESSURE: 142 MMHG | WEIGHT: 160.94 LBS | OXYGEN SATURATION: 97 % | TEMPERATURE: 98 F | RESPIRATION RATE: 18 BRPM | HEART RATE: 70 BPM | HEIGHT: 69 IN | DIASTOLIC BLOOD PRESSURE: 86 MMHG

## 2025-02-13 DIAGNOSIS — R04.0 EPISTAXIS: ICD-10-CM

## 2025-02-13 DIAGNOSIS — I48.91 UNSPECIFIED ATRIAL FIBRILLATION: ICD-10-CM

## 2025-02-13 DIAGNOSIS — Z90.89 ACQUIRED ABSENCE OF OTHER ORGANS: Chronic | ICD-10-CM

## 2025-02-13 DIAGNOSIS — Z96.643 PRESENCE OF ARTIFICIAL HIP JOINT, BILATERAL: Chronic | ICD-10-CM

## 2025-02-13 DIAGNOSIS — Z90.49 ACQUIRED ABSENCE OF OTHER SPECIFIED PARTS OF DIGESTIVE TRACT: Chronic | ICD-10-CM

## 2025-02-13 DIAGNOSIS — Z98.41 CATARACT EXTRACTION STATUS, RIGHT EYE: Chronic | ICD-10-CM

## 2025-02-13 LAB
ANION GAP SERPL CALC-SCNC: 14 MMOL/L — SIGNIFICANT CHANGE UP (ref 5–17)
BLD GP AB SCN SERPL QL: NEGATIVE — SIGNIFICANT CHANGE UP
BUN SERPL-MCNC: 30 MG/DL — HIGH (ref 7–23)
CALCIUM SERPL-MCNC: 9.9 MG/DL — SIGNIFICANT CHANGE UP (ref 8.4–10.5)
CHLORIDE SERPL-SCNC: 103 MMOL/L — SIGNIFICANT CHANGE UP (ref 96–108)
CO2 SERPL-SCNC: 23 MMOL/L — SIGNIFICANT CHANGE UP (ref 22–31)
CREAT SERPL-MCNC: 1.04 MG/DL — SIGNIFICANT CHANGE UP (ref 0.5–1.3)
EGFR: 70 ML/MIN/1.73M2 — SIGNIFICANT CHANGE UP
GLUCOSE SERPL-MCNC: 98 MG/DL — SIGNIFICANT CHANGE UP (ref 70–99)
HCT VFR BLD CALC: 37 % — LOW (ref 39–50)
HGB BLD-MCNC: 11 G/DL — LOW (ref 13–17)
MCHC RBC-ENTMCNC: 26.8 PG — LOW (ref 27–34)
MCHC RBC-ENTMCNC: 29.7 G/DL — LOW (ref 32–36)
MCV RBC AUTO: 90.2 FL — SIGNIFICANT CHANGE UP (ref 80–100)
NRBC BLD AUTO-RTO: 0 /100 WBCS — SIGNIFICANT CHANGE UP (ref 0–0)
PLATELET # BLD AUTO: 250 K/UL — SIGNIFICANT CHANGE UP (ref 150–400)
POTASSIUM SERPL-MCNC: 4.3 MMOL/L — SIGNIFICANT CHANGE UP (ref 3.5–5.3)
POTASSIUM SERPL-SCNC: 4.3 MMOL/L — SIGNIFICANT CHANGE UP (ref 3.5–5.3)
RBC # BLD: 4.1 M/UL — LOW (ref 4.2–5.8)
RBC # FLD: 16 % — HIGH (ref 10.3–14.5)
RH IG SCN BLD-IMP: POSITIVE — SIGNIFICANT CHANGE UP
SODIUM SERPL-SCNC: 140 MMOL/L — SIGNIFICANT CHANGE UP (ref 135–145)
WBC # BLD: 6.89 K/UL — SIGNIFICANT CHANGE UP (ref 3.8–10.5)
WBC # FLD AUTO: 6.89 K/UL — SIGNIFICANT CHANGE UP (ref 3.8–10.5)

## 2025-02-13 PROCEDURE — 86901 BLOOD TYPING SEROLOGIC RH(D): CPT

## 2025-02-13 PROCEDURE — 86850 RBC ANTIBODY SCREEN: CPT

## 2025-02-13 PROCEDURE — 86900 BLOOD TYPING SEROLOGIC ABO: CPT

## 2025-02-13 PROCEDURE — 80048 BASIC METABOLIC PNL TOTAL CA: CPT

## 2025-02-13 PROCEDURE — 99212 OFFICE O/P EST SF 10 MIN: CPT

## 2025-02-13 PROCEDURE — G2211 COMPLEX E/M VISIT ADD ON: CPT

## 2025-02-13 PROCEDURE — G0463: CPT

## 2025-02-13 PROCEDURE — 85027 COMPLETE CBC AUTOMATED: CPT

## 2025-02-13 NOTE — H&P PST ADULT - PROBLEM SELECTOR PLAN 1
Watchman procedure scheduled for 2/18/25  Currently on Eliquis. Instructed to stop 24 hour prior to procedure as per Dr's Orders.   Blood drawn and sent.   All instructions provided to pt and spouse. Both verbalized understanding of instructions.  Chlorhexidine provided.

## 2025-02-13 NOTE — H&P PST ADULT - ASSESSMENT
No loose cracked/ implantable teeth.   DASIMETS 6.45  CAPRINI SCORE    AGE RELATED RISK FACTORS                                                             [ ] Age 41-60 years                                            (1 Point)  [x ] Age: 61-74 years                                           (2 Points)                 [ ] Age= 75 years                                                (3 Points)             DISEASE RELATED RISK FACTORS                                                       [ ] Edema in the lower extremities                 (1 Point)                     [ ] Varicose veins                                               (1 Point)                                 [ ] BMI > 25 Kg/m2                                            (1 Point)                                  [ ] Serious infection (ie PNA)                            (1 Point)                     [ ] Lung disease ( COPD, Emphysema)            (1 Point)                                                                          [ ] Acute myocardial infarction                         (1 Point)                  [ ] Congestive heart failure (in the previous month)  (1 Point)         [ ] Inflammatory bowel disease                            (1 Point)                  [ ] Central venous access, PICC or Port               (2 points)       (within the last month)                                                                [ ] Stroke (in the previous month)                        (5 Points)    [ ] Previous or present malignancy                       (2 points)                                                                                                                                                         HEMATOLOGY RELATED FACTORS                                                         [ ] Prior episodes of VTE                                     (3 Points)                     [ ] Positive family history for VTE                      (3 Points)                  [ ] Prothrombin 08516 A                                     (3 Points)                     [ ] Factor V Leiden                                                (3 Points)                        [ ] Lupus anticoagulants                                      (3 Points)                                                           [ ] Anticardiolipin antibodies                              (3 Points)                                                       [ ] High homocysteine in the blood                   (3 Points)                                             [ ] Other congenital or acquired thrombophilia      (3 Points)                                                [ ] Heparin induced thrombocytopenia                  (3 Points)                                        MOBILITY RELATED FACTORS  [ ] Bed rest                                                         (1 Point)  [ ] Plaster cast                                                    (2 points)  [ ] Bed bound for more than 72 hours           (2 Points)    GENDER SPECIFIC FACTORS  [ ] Pregnancy or had a baby within the last month   (1 Point)  [ ] Post-partum < 6 weeks                                   (1 Point)  [ ] Hormonal therapy  or oral contraception   (1 Point)  [ ] History of pregnancy complications              (1 point)  [ ] Unexplained or recurrent              (1 Point)    OTHER RISK FACTORS                                           (1 Point)  [ ] BMI >40, smoking, diabetes requiring insulin, chemotherapy  blood transfusions and length of surgery over 2 hours    SURGERY RELATED RISK FACTORS  [ ]  Section within the last month     (1 Point)  [ ] Minor surgery                                                  (1 Point)  [ ] Arthroscopic surgery                                       (2 Points)  [x ] Planned major surgery lasting more            (2 Points)      than 45 minutes     [ ] Elective hip or knee joint replacement       (5 points)       surgery                                                TRAUMA RELATED RISK FACTORS  [ ] Fracture of the hip, pelvis, or leg                       (5 Points)  [ ] Spinal cord injury resulting in paralysis             (5 points)       (in the previous month)    [ ] Paralysis  (less than 1 month)                             (5 Points)  [ ] Multiple Trauma within 1 month                        (5 Points)    Total Score [     4   ]    Caprini Score 0-2: Low Risk, NO VTE prophylaxis required for most patients, encourage ambulation  Caprini Score 3-6: Moderate Risk , pharmacologic VTE prophylaxis is indicated for most patients (in the absence of contraindications)  Caprini Score Greater than or =7: High risk, pharmocologic VTE prophylaxis indicated for most patients (in the absence of contraindications)

## 2025-02-13 NOTE — H&P PST ADULT - IS PATIENT PREGNANT?
Verbal order given by Dr. Motta Balling to draw up 4 mL 0.25% Sensorcaine and 0.5 mL 30 mg/5mL Betamethasone. not applicable (Male)

## 2025-02-13 NOTE — H&P PST ADULT - HISTORY OF PRESENT ILLNESS
84 year old pleasant male with  PMH of HTN, HDL GERD, Afib on Eliquis x2 day (Started after last Hospitalization). Recently hospitalized due to GI bleed while on Xarelto s/p multiple transfusions and embolization while hospitalized.   Pt Presents today for prescheduled Watchman placement and presurgical testing. Pt denies any HA, CP, SOB, palpitation, N/V/D, fever, cough, chills, dizziness, or any other s/s of bleeding.

## 2025-02-19 ENCOUNTER — TRANSCRIPTION ENCOUNTER (OUTPATIENT)
Age: 85
End: 2025-02-19

## 2025-02-19 ENCOUNTER — INPATIENT (INPATIENT)
Facility: HOSPITAL | Age: 85
LOS: 1 days | Discharge: HOME | DRG: 310 | End: 2025-02-21
Attending: INTERNAL MEDICINE | Admitting: INTERNAL MEDICINE
Payer: MEDICARE

## 2025-02-19 ENCOUNTER — RESULT REVIEW (OUTPATIENT)
Age: 85
End: 2025-02-19

## 2025-02-19 VITALS
SYSTOLIC BLOOD PRESSURE: 137 MMHG | WEIGHT: 160.94 LBS | HEART RATE: 64 BPM | HEIGHT: 69 IN | DIASTOLIC BLOOD PRESSURE: 99 MMHG | RESPIRATION RATE: 18 BRPM | OXYGEN SATURATION: 100 % | TEMPERATURE: 98 F

## 2025-02-19 DIAGNOSIS — I48.91 UNSPECIFIED ATRIAL FIBRILLATION: ICD-10-CM

## 2025-02-19 DIAGNOSIS — Z96.643 PRESENCE OF ARTIFICIAL HIP JOINT, BILATERAL: Chronic | ICD-10-CM

## 2025-02-19 DIAGNOSIS — Z98.41 CATARACT EXTRACTION STATUS, RIGHT EYE: Chronic | ICD-10-CM

## 2025-02-19 DIAGNOSIS — Z90.89 ACQUIRED ABSENCE OF OTHER ORGANS: Chronic | ICD-10-CM

## 2025-02-19 DIAGNOSIS — Z90.49 ACQUIRED ABSENCE OF OTHER SPECIFIED PARTS OF DIGESTIVE TRACT: Chronic | ICD-10-CM

## 2025-02-19 LAB
BLD GP AB SCN SERPL QL: NEGATIVE — SIGNIFICANT CHANGE UP
RH IG SCN BLD-IMP: POSITIVE — SIGNIFICANT CHANGE UP

## 2025-02-19 PROCEDURE — 93355 ECHO TRANSESOPHAGEAL (TEE): CPT

## 2025-02-19 PROCEDURE — 99152 MOD SED SAME PHYS/QHP 5/>YRS: CPT

## 2025-02-19 PROCEDURE — 33340 PERQ CLSR TCAT L ATR APNDGE: CPT

## 2025-02-19 PROCEDURE — 93010 ELECTROCARDIOGRAM REPORT: CPT | Mod: 76

## 2025-02-19 PROCEDURE — 76377 3D RENDER W/INTRP POSTPROCES: CPT | Mod: 26,59

## 2025-02-19 RX ORDER — MELATONIN 5 MG
5 TABLET ORAL AT BEDTIME
Refills: 0 | Status: DISCONTINUED | OUTPATIENT
Start: 2025-02-19 | End: 2025-02-21

## 2025-02-19 RX ORDER — LISINOPRIL 5 MG/1
40 TABLET ORAL DAILY
Refills: 0 | Status: DISCONTINUED | OUTPATIENT
Start: 2025-02-19 | End: 2025-02-20

## 2025-02-19 RX ORDER — AMLODIPINE BESYLATE 10 MG/1
10 TABLET ORAL DAILY
Refills: 0 | Status: DISCONTINUED | OUTPATIENT
Start: 2025-02-19 | End: 2025-02-20

## 2025-02-19 RX ORDER — CLOPIDOGREL BISULFATE 75 MG/1
75 TABLET, FILM COATED ORAL DAILY
Refills: 0 | Status: DISCONTINUED | OUTPATIENT
Start: 2025-02-19 | End: 2025-02-21

## 2025-02-19 RX ORDER — ATORVASTATIN CALCIUM 80 MG/1
10 TABLET, FILM COATED ORAL AT BEDTIME
Refills: 0 | Status: DISCONTINUED | OUTPATIENT
Start: 2025-02-19 | End: 2025-02-21

## 2025-02-19 RX ORDER — ACETAMINOPHEN 500 MG/5ML
650 LIQUID (ML) ORAL EVERY 6 HOURS
Refills: 0 | Status: DISCONTINUED | OUTPATIENT
Start: 2025-02-19 | End: 2025-02-21

## 2025-02-19 RX ORDER — ASPIRIN 325 MG
81 TABLET ORAL DAILY
Refills: 0 | Status: DISCONTINUED | OUTPATIENT
Start: 2025-02-19 | End: 2025-02-21

## 2025-02-19 RX ADMIN — Medication 5 MILLIGRAM(S): at 22:05

## 2025-02-19 RX ADMIN — ATORVASTATIN CALCIUM 10 MILLIGRAM(S): 80 TABLET, FILM COATED ORAL at 22:05

## 2025-02-19 RX ADMIN — CLOPIDOGREL BISULFATE 75 MILLIGRAM(S): 75 TABLET, FILM COATED ORAL at 10:08

## 2025-02-19 RX ADMIN — Medication 81 MILLIGRAM(S): at 10:08

## 2025-02-19 NOTE — PRE-ANESTHESIA EVALUATION ADULT - NSRADCARDRESULTSFT_GEN_ALL_CORE
< from: TTE W or WO Ultrasound Enhancing Agent (01.16.25 @ 10:44) >    _______________________________________________________________________________________     CONCLUSIONS:      1. Left ventricular cavity is normal in size. Left ventricular wall thickness is normal. Left ventricular systolic function is normal with an ejection fraction of 60 % by Hillman's method of disks. There are no regional wall motion abnormalities seen.   2. The left ventricular diastolic function is indeterminate, with normal left ventricular filling pressure.   3. Left atrium is severely dilated.   4. Prolapse of both mitral leaflets.   5. Moderate mitral regurgitation at a blood pressure of 129/77 mmHg.   6. 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   7. Normal right ventricular cavity size, with normal wall thickness, and normal right ventricular systolic function.   8. No pericardial effusion seen.   9. Compared to the transthoracic echocardiogram performed on 11/6/2023, the ascending aorta diamete is now 4.5cm.    < end of copied text >

## 2025-02-19 NOTE — DISCHARGE NOTE PROVIDER - NSDCFUADDINST_GEN_ALL_CORE_FT
Left Atrial Appendage Occlusion Device (Watchman)     Wound Care:   Your procedure was done through your groin. The day AFTER your procedure:   - Remove the bandage at the site gently , clean with soap and water, pat dry; leave open to air  -  You may shower  - DO NOT apply lotions, powders, ointments, or perfumes  - Check your groin every day; a small amount of soreness and bruising, bump smaller than a nickel is normal  - DO NOT soak your procedure site in water for 1 week (no baths, no swimming, etc.)    ACTIVITY:   For the next 7 days:  - Limit climbing stairs  - Do not lift anything heavier than 10lbs  - No strenuous activities; no pushing, no pulling or straining (especially during bowel movements)  - You may resume sexual activity after 48 hours; unless otherwise instructed    DIET: follow a heart healthy diet recommended by your cardiologist   For the next 24 hours:  - Stay home and REST. Do NOT drive or operate heavy machinery  - Do not drink alcoholic beverages  - Do not make important personal or financial decisions  - IF YOU SMOKE; PLEASE QUIT SMOKING. If you require assistance; you may call this free tobacco cessation hotline: [phone]     CALL YOUR DOCTOR  - IF you have fever, chills, body aches, severe pain, swelling, redness, heat or yellow discharge from incision site  - IF you have bleeding in large amounts, new swelling at puncture site, or if area becomes red, hot to touch, painful   - IF you experience lightheadedness, dizziness, or fainting spells     If you cannot get in touch with your cardiologist, you may call the Cardiology office at [facility] at [phone]    FOLLOW UP APPOINTMENT  - You are scheduled for a repeat OLLIE to determine a plan for your anticoagulation medication (blood thinners) 45days after your procedure, make sure you follow up  **Please follow up with Dr. García on Monday as scheduled to have your blood pressure and blood count (H/H) checked**    Left Atrial Appendage Occlusion Device (Watchman)     Wound Care:   Your procedure was done through your groin. The day AFTER your procedure:   - Remove the bandage at the site gently , clean with soap and water, pat dry; leave open to air  -  You may shower  - DO NOT apply lotions, powders, ointments, or perfumes  - Check your groin every day; a small amount of soreness and bruising, bump smaller than a nickel is normal  - DO NOT soak your procedure site in water for 1 week (no baths, no swimming, etc.)    ACTIVITY:   For the next 7 days:  - Limit climbing stairs  - Do not lift anything heavier than 10lbs  - No strenuous activities; no pushing, no pulling or straining (especially during bowel movements)  - You may resume sexual activity after 48 hours; unless otherwise instructed    DIET: follow a heart healthy diet recommended by your cardiologist   For the next 24 hours:  - Stay home and REST. Do NOT drive or operate heavy machinery  - Do not drink alcoholic beverages  - Do not make important personal or financial decisions  - IF YOU SMOKE; PLEASE QUIT SMOKING. If you require assistance; you may call this free tobacco cessation hotline: [phone]     CALL YOUR DOCTOR  - IF you have fever, chills, body aches, severe pain, swelling, redness, heat or yellow discharge from incision site  - IF you have bleeding in large amounts, new swelling at puncture site, or if area becomes red, hot to touch, painful   - IF you experience lightheadedness, dizziness, or fainting spells     If you cannot get in touch with your cardiologist, you may call the Cardiology office at [facility] at [phone]    FOLLOW UP APPOINTMENT  - You are scheduled for a repeat OLLIE to determine a plan for your anticoagulation medication (blood thinners) 45days after your procedure, make sure you follow up

## 2025-02-19 NOTE — DISCHARGE NOTE PROVIDER - NSDCCPTREATMENT_GEN_ALL_CORE_FT
PRINCIPAL PROCEDURE  Procedure: Insertion, Watchman left atrial appendage closure device  Findings and Treatment: Study Date:     02/19/2025   CPT Code:                      58061 - Ultrasound Guidance for vascular  access    24135  - CATARINA transcatheter closure   Procedures Performed   Primary Procedures:      Left Atrial Appendage Occlusion    Conclusions   Successful CATARINA occlusion with 31 mm Watchman FLX pro   Follow-Up   Bedrest x 3 hours   CXR PA and Lateral in AM   ASA/Plavix    Complications   No complications     PRINCIPAL PROCEDURE  Procedure: Insertion, Watchman left atrial appendage closure device  Findings and Treatment: Study Date:     02/19/2025   CPT Code:                      53055 - Ultrasound Guidance for vascular  access    75443  - CATARINA transcatheter closure   Procedures Performed   Primary Procedures:      Left Atrial Appendage Occlusion    Conclusions   Successful CATARINA occlusion with 31 mm Watchman FLX pro   Follow-Up   Bedrest x 3 hours   CXR PA and Lateral in AM   ASA/Plavix    Complications   No complications      SECONDARY PROCEDURE  Procedure: Follow-up or limited transthoracic echocardiography (TTE)  Findings and Treatment: Study Date:    2/20/2025  CONCLUSIONS:      1. Left ventricular systolic function is normal.   2. No pericardial effusion seen.  ________________________________________________________________________________________  FINDINGS:     Left Ventricle:  Left ventricular systolic function is normal.     Pericardium:  No pericardial effusion seen.

## 2025-02-19 NOTE — DISCHARGE NOTE PROVIDER - NSDCMRMEDTOKEN_GEN_ALL_CORE_FT
amLODIPine 10 mg oral tablet: 1 tab(s) orally once a day  Eliquis 5 mg oral tablet: 1 tab(s) orally 2 times a day  pantoprazole 40 mg oral delayed release tablet: 1 tab(s) orally 2 times a day  ramipril 10 mg oral capsule: 1 cap(s) orally once a day  simvastatin 20 mg oral tablet: 1 tab(s) orally once a day (at bedtime)   acetaminophen 325 mg oral tablet: 2 tab(s) orally every 6 hours As needed Mild Pain (1 - 3), Moderate Pain (4 - 6)  amLODIPine 10 mg oral tablet: 1 tab(s) orally once a day  aspirin 81 mg oral delayed release tablet: 1 tab(s) orally once a day  atorvastatin 10 mg oral tablet: 1 tab(s) orally once a day (at bedtime)  clopidogrel 75 mg oral tablet: 1 tab(s) orally once a day  pantoprazole 40 mg oral delayed release tablet: 1 tab(s) orally 2 times a day  ramipril 10 mg oral capsule: 1 cap(s) orally once a day   aspirin 81 mg oral delayed release tablet: 1 tab(s) orally once a day  clopidogrel 75 mg oral tablet: 1 tab(s) orally once a day  pantoprazole 40 mg oral delayed release tablet: 1 tab(s) orally 2 times a day  simvastatin 20 mg oral tablet: 1 tab(s) orally once a day

## 2025-02-19 NOTE — PATIENT PROFILE ADULT - FALL HARM RISK - HARM RISK INTERVENTIONS

## 2025-02-19 NOTE — DISCHARGE NOTE PROVIDER - NSDCFUSCHEDAPPT_GEN_ALL_CORE_FT
Gagan Santos  [facility]  [address]  Scheduled Appointment: 03/11/2025     Toni García  [facility]  CARDIOLOGY [address] [facility]   Scheduled Appointment: 02/24/2025    Gagan Santos  [facility]  [address]  Scheduled Appointment: 03/11/2025

## 2025-02-19 NOTE — DISCHARGE NOTE PROVIDER - PROVIDER TOKENS
PROVIDER:[TOKEN:[[id number]],SCHEDULEDAPPT:[03/11/2025],SCHEDULEDAPPTTIME:[01:45 PM],ESTABLISHEDPATIENT:[T]] PROVIDER:[TOKEN:[[id number]],SCHEDULEDAPPT:[03/11/2025],SCHEDULEDAPPTTIME:[01:45 PM],ESTABLISHEDPATIENT:[T]],PROVIDER:[TOKEN:[[id number]],SCHEDULEDAPPT:[02/24/2025],SCHEDULEDAPPTTIME:[11:30 AM]]

## 2025-02-19 NOTE — DISCHARGE NOTE PROVIDER - NSDCCPCAREPLAN_GEN_ALL_CORE_FT
PRINCIPAL DISCHARGE DIAGNOSIS  Diagnosis: Atrial fibrillation  Assessment and Plan of Treatment: Atrial fibrillation is a condition in which your heart's upper chambers (atria) beat too quickly. This causes the heart to beat in a fast, irregular rhythm. Atrial fibrillation is a type of heart rhythm disorder (arrhythmia) caused by problems in your heart's electrical system. Without treatment, atrial fibrillation/flutter can also cause a fast pulse rate for long periods of time. This means that the ventricles are beating too fast, and the heart muscle can become weak. This can lead to heart failure and long-term disability.  You underwent a successful left atrial appendage occlusion device via your right groin.  The procedure was done through the groin. Please avoid any heavy lifting (no more than 3 to 5 lbs), strenuous activity, bending, straining, or unnecessary stair climbing for 2 weeks. No driving for 2 days. You may shower 24 hours following the procedure but avoid baths/swimming for 1 week. If you develop any swelling, bleeding, hardening of the skin (hematoma formation), acute pain, numbness/tingling in your leg, or have any questions/concerns regarding your procedure, please call [facility] [phone]  Please make a follow up appointment with your cardiologist within 1-2 weeks of your discharge. All of your prescriptions have been sent electronically to your pharmacy.      SECONDARY DISCHARGE DIAGNOSES  Diagnosis: HTN (hypertension)  Assessment and Plan of Treatment: You have high blood pressure. Continue taking your blood pressure medications as prescribed. Eat a heart healthy diet with low salt; exercise regularly (if cleared by your primary care doctor or cardiologist). Maintain a heart healthy weight and include healthy ways to manage stress. If you smoke, quit. If you need assistance to help you stop smoking, please use the following resource: [facility] – ([phone]). Follow up with your primary care doctor to continue having your blood pressure checked on a continual basis.    Diagnosis: HLD (hyperlipidemia)  Assessment and Plan of Treatment: LDL<70   Goal is to keep LDL<70. Continue with your cholesterol medications as prescribed. Eat a heart healthy diet that is low in saturated fats and salt, and includes whole grains, fruits, vegetables and lean protein; exercise regularly (consult with your physician or cardiologist first); maintain a heart healthy weight; if you smoke - quit (A resource to help you stop smoking is the [facility] – phone number [phone].). Continue to follow with your primary physician or cardiologist.

## 2025-02-19 NOTE — DISCHARGE NOTE PROVIDER - CARE PROVIDERS DIRECT ADDRESSES
[email or web address]

## 2025-02-19 NOTE — DISCHARGE NOTE PROVIDER - HOSPITAL COURSE
84M Hx HTN, HLD, Afib, GERD, recent  hospitalization for GIB while on Xarelto (s/p transfusions & embolization) presents for watchman implant.Patient was given standard antibiotic prophylaxis pre-procedure. On 2/19/25, patient underwent a successful CATARINA occlusion device/watchman (31mm) via right femoral vein. Hemostasis achieved via vascade closure devices per protocol w/o any complications; site stable -- soft, nontender, no hematoma. Intraoperative TTE revealed no pericardial effusion. ***Post-procedure CXR PA/lat 2/20/25 ___ confirmed well-seated device.*** Denies any complaints at this time. Patient remained hemodynamically stable post-procedure. Remained overnight for observation and pain control. Patient has been medically cleared for discharge as per Dr. Santos. Patient has been given appropriate discharge instructions including medication regimen, access site management and follow up. Medications that patient needs refills on (+/- new medications) have been e-prescribed to preferred pharmacy. Patient will f/u with Dr. Santos within 1-2 weeks for further management.     VSS  Gen: NAD, A&O x3  Cards: RRR, clear S1 and S2 without murmur  Pulm: CTA B/L without w/r/r  Vascular: Right groin w/o hematoma, ooze or bruit. Peripheral pulses 2+ B/L  Abd: soft, NT  Ext: no LE edema or ulcerations B/L   84M Hx HTN, HLD, Afib, GERD, recent  hospitalization for GIB while on Xarelto (s/p transfusions & embolization) presents for watchman implant.Patient was given standard antibiotic prophylaxis pre-procedure.     On 2/19/25, patient underwent a successful CATARINA occlusion device/watchman (31mm) via right femoral vein. Hemostasis achieved via vascade closure devices per protocol w/o any complications; site stable -- soft, nontender, no hematoma. Intraoperative TTE revealed no pericardial effusion. ***Post-procedure CXR PA/lat 2/20/25 ___ confirmed well-seated device.*** Denies any complaints at this time. Patient remained hemodynamically stable post-procedure. Remained overnight for observation and pain control. Patient has been medically cleared for discharge as per Dr. Santos. Patient has been given appropriate discharge instructions including medication regimen, access site management and follow up. Medications that patient needs refills on (+/- new medications) have been e-prescribed to preferred pharmacy. Patient will f/u with Dr. Santos within 1-2 weeks for further management.     VSS  Gen: NAD, A&O x3  Cards: RRR, clear S1 and S2 without murmur  Pulm: CTA B/L without w/r/r  Vascular: Right groin w/o hematoma, ooze or bruit. Peripheral pulses 2+ B/L  Abd: soft, NT  Ext: no LE edema or ulcerations B/L   84M Hx HTN, HLD, Afib, GERD, recent  hospitalization for GIB while on Xarelto (s/p transfusions & embolization) presents for watchman implant.Patient was given standard antibiotic prophylaxis pre-procedure.     On 2/19/25, patient underwent a successful CATARINA occlusion device/watchman (31mm) via right femoral vein. Hemostasis achieved via vascade closure devices per protocol w/o any complications; site stable -- soft, nontender, no hematoma. Intraoperative TTE revealed no pericardial effusion. Post-procedure CXR PA/lat 2/20/25 confirmed well-seated device. Pt hypotensive post-procedure, IV bolus given w/ improvement of BP & limited TTE performed which was ___ for pericardial effusion. *** Denies any complaints at this time. Patient remained hemodynamically stable post-procedure. Remained overnight for observation and pain control. Patient has been medically cleared for discharge as per Dr. Santos. Patient has been given appropriate discharge instructions including medication regimen, access site management and follow up. Medications that patient needs refills on (+/- new medications) have been e-prescribed to preferred pharmacy. Patient will f/u with Dr. Santos within 1-2 weeks for further management.     VSS  Gen: NAD, A&O x3  Cards: RRR, clear S1 and S2 without murmur  Pulm: CTA B/L without w/r/r  Vascular: Right groin w/o hematoma, ooze or bruit. Peripheral pulses 2+ B/L  Abd: soft, NT  Ext: no LE edema or ulcerations B/L   84M Hx HTN, HLD, Afib, GERD, recent  hospitalization for GIB while on Xarelto (s/p transfusions & embolization) presents for watchman implant.Patient was given standard antibiotic prophylaxis pre-procedure.     On 2/19/25, patient underwent a successful CATARINA occlusion device/watchman (31mm) via right femoral vein. Hemostasis achieved via vascade closure devices per protocol w/o any complications; site stable -- soft, nontender, no hematoma. Intraoperative TTE revealed no pericardial effusion. Post-procedure CXR PA/lat 2/20/25 confirmed well-seated device. Pt hypotensive post-procedure, IV bolus given w/ improvement of BP & limited TTE performed which was negative for pericardial effusion. Denies any complaints at this time. Patient remained hemodynamically stable post-procedure. Remained overnight for observation and pain control. Patient has been medically cleared for discharge as per Dr. Santos. Patient has been given appropriate discharge instructions including medication regimen, access site management and follow up. Medications that patient needs refills on (+/- new medications) have been e-prescribed to preferred pharmacy. Patient will f/u with Dr. Santos within 1-2 weeks for further management.     VSS  Gen: NAD, A&O x3  Cards: RRR, clear S1 and S2 without murmur  Pulm: CTA B/L without w/r/r  Vascular: Right groin w/o hematoma, ooze or bruit. Peripheral pulses 2+ B/L  Abd: soft, NT  Ext: no LE edema or ulcerations B/L   84M Hx HTN, HLD, Afib, GERD, recent  hospitalization for GIB while on Xarelto (s/p transfusions & embolization) presents for watchman implant.Patient was given standard antibiotic prophylaxis pre-procedure.     On 2/19/25, patient underwent a successful CATARINA occlusion device/watchman (31mm) via right femoral vein. Hemostasis achieved via vascade closure devices per protocol w/o any complications; site stable -- soft, nontender, no hematoma. Intraoperative TTE revealed no pericardial effusion. Post-procedure CXR PA/lat 2/20/25 confirmed well-seated device.   Pt hypotensive post-procedure, IV bolus given and antihypertensives held with some improvement of BP. H/H downtrending- limited TTE performed which was negative for pericardial effusion; CT ab/pelvis negative for RP bleed. Pt remained asymptomatic but stayed overnight for observation. BP trend improved; pt with marked bradycardia with pauses overnight (reviewed by Dr. Santos).  Ambulated and demonstrated chronotropic competence. Remained asyptomatic. Patient has been medically cleared for discharge as per Dr. Santos. Plan to discharge pt home today with appointment for follow up with Dr. García on 2/24/25 at 1130am (Dr. Diaz not available) to check BP and trend H/H. Additional follow up pending clinical course. Follow up with Dr. Santos on 3/11/25.. I reviewed discharge instructions and follow up at length with pt and family - all questions and concerns addressed and they verbalize understanding of discharge plan discussed.  VSS  Gen: NAD, A&O x3  Cards: RRR, clear S1 and S2 without murmur  Pulm: CTA B/L without w/r/r  Vascular: Right groin softly ecchymotic w/o hematoma or bleeding. Peripheral pulses 2+ B/L.   Abd: soft, NT  Ext: no edema   84M Hx HTN, HLD, Afib, GERD, recent  hospitalization for GIB while on Xarelto (s/p transfusions & embolization) presents for watchman implant.Patient was given standard antibiotic prophylaxis pre-procedure.     On 2/19/25, patient underwent a successful CATARINA occlusion device/watchman (31mm) via right femoral vein. Hemostasis achieved via vascade closure devices per protocol w/o any complications; site stable -- soft, nontender, no hematoma. Intraoperative TTE revealed no pericardial effusion. Post-procedure CXR PA/lat 2/20/25 confirmed well-seated device.   Pt hypotensive post-procedure, IV bolus given and antihypertensives held with some improvement of BP. H/H downtrending- limited TTE performed which was negative for pericardial effusion; CT ab/pelvis negative for RP bleed. Pt remained asymptomatic but stayed overnight for observation. BP trend improved; pt with marked bradycardia with pauses overnight (reviewed by Dr. Santos).  Ambulated and demonstrated chronotropic competence. Remained asyptomatic. Patient has been medically cleared for discharge as per Dr. Santos. Plan to discharge pt home today with appointment for follow up with Dr. García on 2/24/25 at 1130am (Dr. Diaz not available) to check BP and trend H/H. Additional follow up pending clinical course. Follow up with Dr. Santos on 3/11/25.. I reviewed discharge instructions and follow up at length with pt and family - all questions and concerns addressed and they verbalize understanding of discharge plan discussed.  VSS  Gen: NAD, A&O x3  Cards: irreg RR, S1 and S2   Pulm: CTA B/L without w/r/r  Vascular: Right groin softly ecchymotic w/o hematoma or bleeding. Peripheral pulses 2+ B/L.   Abd: soft, NT  Ext: no edema

## 2025-02-20 ENCOUNTER — TRANSCRIPTION ENCOUNTER (OUTPATIENT)
Age: 85
End: 2025-02-20

## 2025-02-20 ENCOUNTER — RESULT REVIEW (OUTPATIENT)
Age: 85
End: 2025-02-20

## 2025-02-20 DIAGNOSIS — I48.91 UNSPECIFIED ATRIAL FIBRILLATION: ICD-10-CM

## 2025-02-20 DIAGNOSIS — E78.5 HYPERLIPIDEMIA, UNSPECIFIED: ICD-10-CM

## 2025-02-20 DIAGNOSIS — I10 ESSENTIAL (PRIMARY) HYPERTENSION: ICD-10-CM

## 2025-02-20 LAB
ANION GAP SERPL CALC-SCNC: 10 MMOL/L — SIGNIFICANT CHANGE UP (ref 5–17)
BUN SERPL-MCNC: 30 MG/DL — HIGH (ref 7–23)
CALCIUM SERPL-MCNC: 8.7 MG/DL — SIGNIFICANT CHANGE UP (ref 8.4–10.5)
CHLORIDE SERPL-SCNC: 105 MMOL/L — SIGNIFICANT CHANGE UP (ref 96–108)
CO2 SERPL-SCNC: 20 MMOL/L — LOW (ref 22–31)
CREAT SERPL-MCNC: 0.97 MG/DL — SIGNIFICANT CHANGE UP (ref 0.5–1.3)
EGFR: 76 ML/MIN/1.73M2 — SIGNIFICANT CHANGE UP
GLUCOSE SERPL-MCNC: 204 MG/DL — HIGH (ref 70–99)
HCT VFR BLD CALC: 27.1 % — LOW (ref 39–50)
HCT VFR BLD CALC: 27.3 % — LOW (ref 39–50)
HCT VFR BLD CALC: 29.1 % — LOW (ref 39–50)
HGB BLD-MCNC: 8.3 G/DL — LOW (ref 13–17)
HGB BLD-MCNC: 8.5 G/DL — LOW (ref 13–17)
HGB BLD-MCNC: 9.1 G/DL — LOW (ref 13–17)
MAGNESIUM SERPL-MCNC: 2 MG/DL — SIGNIFICANT CHANGE UP (ref 1.6–2.6)
MCHC RBC-ENTMCNC: 26.3 PG — LOW (ref 27–34)
MCHC RBC-ENTMCNC: 26.8 PG — LOW (ref 27–34)
MCHC RBC-ENTMCNC: 27.2 PG — SIGNIFICANT CHANGE UP (ref 27–34)
MCHC RBC-ENTMCNC: 30.4 G/DL — LOW (ref 32–36)
MCHC RBC-ENTMCNC: 31.3 G/DL — LOW (ref 32–36)
MCHC RBC-ENTMCNC: 31.4 G/DL — LOW (ref 32–36)
MCV RBC AUTO: 85.5 FL — SIGNIFICANT CHANGE UP (ref 80–100)
MCV RBC AUTO: 86.7 FL — SIGNIFICANT CHANGE UP (ref 80–100)
MCV RBC AUTO: 87.1 FL — SIGNIFICANT CHANGE UP (ref 80–100)
NRBC BLD AUTO-RTO: 0 /100 WBCS — SIGNIFICANT CHANGE UP (ref 0–0)
PLATELET # BLD AUTO: 174 K/UL — SIGNIFICANT CHANGE UP (ref 150–400)
PLATELET # BLD AUTO: 180 K/UL — SIGNIFICANT CHANGE UP (ref 150–400)
PLATELET # BLD AUTO: 180 K/UL — SIGNIFICANT CHANGE UP (ref 150–400)
POTASSIUM SERPL-MCNC: 4.7 MMOL/L — SIGNIFICANT CHANGE UP (ref 3.5–5.3)
POTASSIUM SERPL-SCNC: 4.7 MMOL/L — SIGNIFICANT CHANGE UP (ref 3.5–5.3)
RBC # BLD: 3.15 M/UL — LOW (ref 4.2–5.8)
RBC # BLD: 3.17 M/UL — LOW (ref 4.2–5.8)
RBC # BLD: 3.34 M/UL — LOW (ref 4.2–5.8)
RBC # FLD: 15.9 % — HIGH (ref 10.3–14.5)
RBC # FLD: 16 % — HIGH (ref 10.3–14.5)
RBC # FLD: 16.1 % — HIGH (ref 10.3–14.5)
SODIUM SERPL-SCNC: 135 MMOL/L — SIGNIFICANT CHANGE UP (ref 135–145)
WBC # BLD: 6.5 K/UL — SIGNIFICANT CHANGE UP (ref 3.8–10.5)
WBC # BLD: 8.54 K/UL — SIGNIFICANT CHANGE UP (ref 3.8–10.5)
WBC # BLD: 9.69 K/UL — SIGNIFICANT CHANGE UP (ref 3.8–10.5)
WBC # FLD AUTO: 6.5 K/UL — SIGNIFICANT CHANGE UP (ref 3.8–10.5)
WBC # FLD AUTO: 8.54 K/UL — SIGNIFICANT CHANGE UP (ref 3.8–10.5)
WBC # FLD AUTO: 9.69 K/UL — SIGNIFICANT CHANGE UP (ref 3.8–10.5)

## 2025-02-20 PROCEDURE — 93308 TTE F-UP OR LMTD: CPT | Mod: 26

## 2025-02-20 PROCEDURE — 93010 ELECTROCARDIOGRAM REPORT: CPT

## 2025-02-20 PROCEDURE — 74176 CT ABD & PELVIS W/O CONTRAST: CPT | Mod: 26,AS

## 2025-02-20 PROCEDURE — 74176 CT ABD & PELVIS W/O CONTRAST: CPT | Mod: 26

## 2025-02-20 PROCEDURE — 71046 X-RAY EXAM CHEST 2 VIEWS: CPT | Mod: 26

## 2025-02-20 PROCEDURE — 99291 CRITICAL CARE FIRST HOUR: CPT

## 2025-02-20 PROCEDURE — 74150 CT ABDOMEN W/O CONTRAST: CPT | Mod: 26,AS

## 2025-02-20 RX ORDER — ATORVASTATIN CALCIUM 80 MG/1
1 TABLET, FILM COATED ORAL
Qty: 30 | Refills: 0
Start: 2025-02-20 | End: 2025-03-21

## 2025-02-20 RX ORDER — ACETAMINOPHEN 500 MG/5ML
2 LIQUID (ML) ORAL
Qty: 0 | Refills: 0 | DISCHARGE
Start: 2025-02-20

## 2025-02-20 RX ORDER — CLOPIDOGREL BISULFATE 75 MG/1
1 TABLET, FILM COATED ORAL
Qty: 90 | Refills: 1
Start: 2025-02-20 | End: 2025-08-18

## 2025-02-20 RX ORDER — ASPIRIN 325 MG
1 TABLET ORAL
Qty: 0 | Refills: 0 | DISCHARGE
Start: 2025-02-20

## 2025-02-20 RX ORDER — APIXABAN 5 MG/1
1 TABLET, FILM COATED ORAL
Refills: 0 | DISCHARGE

## 2025-02-20 RX ADMIN — Medication 1000 MILLILITER(S): at 11:34

## 2025-02-20 RX ADMIN — CLOPIDOGREL BISULFATE 75 MILLIGRAM(S): 75 TABLET, FILM COATED ORAL at 05:36

## 2025-02-20 RX ADMIN — Medication 1000 MILLILITER(S): at 12:10

## 2025-02-20 RX ADMIN — Medication 40 MILLIGRAM(S): at 05:36

## 2025-02-20 RX ADMIN — ATORVASTATIN CALCIUM 10 MILLIGRAM(S): 80 TABLET, FILM COATED ORAL at 21:10

## 2025-02-20 RX ADMIN — Medication 5 MILLIGRAM(S): at 21:10

## 2025-02-20 RX ADMIN — LISINOPRIL 40 MILLIGRAM(S): 5 TABLET ORAL at 05:36

## 2025-02-20 RX ADMIN — Medication 75 MILLILITER(S): at 12:11

## 2025-02-20 RX ADMIN — Medication 81 MILLIGRAM(S): at 05:36

## 2025-02-20 RX ADMIN — AMLODIPINE BESYLATE 10 MILLIGRAM(S): 10 TABLET ORAL at 05:36

## 2025-02-20 NOTE — PROGRESS NOTE ADULT - ASSESSMENT
83 y/o male with above pmhx, 2/19 Watchman CATARINA closure device placed via RFV, Vascade x2 to site.

## 2025-02-20 NOTE — PROGRESS NOTE ADULT - PROBLEM SELECTOR PLAN 2
Continue taking your blood pressure medications as prescribed. Eat a heart healthy diet with low salt; exercise regularly (if cleared by your primary care doctor or cardiologist). Maintain a heart healthy weight and include healthy ways to manage stress. If you smoke, quit. If you need assistance to help you stop smoking, please use the following resource: [facility] – ([phone]). Follow up with your primary care doctor to continue having your blood pressure checked on a continual basis.

## 2025-02-20 NOTE — PROGRESS NOTE ADULT - PROBLEM SELECTOR PLAN 1
Serial EKG  Telemetry monitoring  Continue ASA, Plavix  Monitor right groin site for swelling, bleeding  Confirm PA/LAT CXR results prior to D/C home

## 2025-02-20 NOTE — DISCHARGE NOTE NURSING/CASE MANAGEMENT/SOCIAL WORK - PATIENT PORTAL LINK FT
You can access the FollowMyHealth Patient Portal offered by [facility] by registering at the following website: [email or web address]. By joining [facility]’s FollowMyHealth portal, you will also be able to view your health information using other applications (apps) compatible with our system.

## 2025-02-20 NOTE — DISCHARGE NOTE NURSING/CASE MANAGEMENT/SOCIAL WORK - NSDCVIVACCINE_GEN_ALL_CORE_FT
influenza, injectable, quadrivalent, preservative free; 21-Sep-2018 12:52; Vandana Wright (RN); [employer]; [id number] (Exp. Date: 30-Jun-2019); IntraMuscular; Deltoid Right.; 0.5 milliLiter(s); VIS (VIS Published: 07-Aug-2015, VIS Presented: 21-Sep-2018);

## 2025-02-20 NOTE — DISCHARGE NOTE NURSING/CASE MANAGEMENT/SOCIAL WORK - FINANCIAL ASSISTANCE
[facility] provides services at a reduced cost to those who are determined to be eligible through [facility]’s financial assistance program. Information regarding [facility]’s financial assistance program can be found by going to [email or web address] or by calling [phone].

## 2025-02-20 NOTE — PROGRESS NOTE ADULT - ASSESSMENT
84M Hx HTN, HLD, Afib, GERD, recent hospitalization for GIB (s/p transfusions & embolization) presents for watchman implant    # Afib  - [id number] risk stratification score: 3  - Slow afib w/ intermittent pauses on tele  - 2/19/25: s/p CATARINA occlusion w/ 31mm watchman FLX pro via RFV s/p vascade x2  - Right groin site stable; soft, nontender, no hematoma  - Continue ASA 81mg qd, Plavix 75mg qd    # Hypotension  - Pt persistently hypotensive w/ SBP 70-80s (MAP ~50-60s) throughout the day  - s/p 500cc bolus x2, followed by maintenance IVF 75cc/hr w/ improvement of SBP  - Limited TTE 2/20/25: negative for pericardial effusion  - CTAP non con ordered to r/o RP bleed; although low suspicion  - Likely d/t administration of both antihypertensive meds earlier today while SBP was low  - Hold Amlodipine and Lisinopril (Ramipril TIC); consider decreasing dose on discharge    # HLD  - Continue Atorvastatin 10mg qhs    # PPx  - DVT ppx: none  - Diet: DASH/TLC  - GIppx: PPI  - Dispo: pending clinical progression    Ashlee Matute PA-C  Invasive cardiology  [id number] 84M Hx HTN, HLD, Afib, GERD, recent hospitalization for GIB (s/p transfusions & embolization) presents for watchman implant    # Afib  - [id number] risk stratification score: 3  - Slow afib w/ intermittent pauses on tele  - 2/19/25: s/p CATARINA occlusion w/ 31mm watchman FLX pro via RFV s/p vascade x2  - Right groin site stable; soft, nontender, no hematoma  - CXR PA/lat 2/20/25: watchman in place, no PTX - f/u official read  - Continue ASA 81mg qd, Plavix 75mg qd    # Hypotension  - Pt persistently hypotensive w/ SBP 70-80s (MAP ~50-60s) throughout the day  - s/p 500cc bolus x2, followed by maintenance IVF 75cc/hr w/ improvement of SBP  - Limited TTE 2/20/25: negative for pericardial effusion  - CTAP non con ordered to r/o RP bleed; although low suspicion  - Likely d/t administration of both antihypertensive meds earlier today while SBP was low  - Hold Amlodipine and Lisinopril (Ramipril TIC); consider decreasing dose on discharge    # HLD  - Continue Atorvastatin 10mg qhs    # PPx  - DVT ppx: none  - Diet: DASH/TLC  - GIppx: PPI  - Dispo: pending clinical progression    Ashlee Matute PA-C  Invasive cardiology  [phone]

## 2025-02-20 NOTE — PROGRESS NOTE ADULT - PROBLEM SELECTOR PLAN 3
Goal is to keep LDL<70. Continue with your cholesterol medications as prescribed. Eat a heart healthy diet that is low in saturated fats and salt, and includes whole grains, fruits, vegetables and lean protein; exercise regularly (consult with your physician or cardiologist first); maintain a heart healthy weight; if you smoke - quit (A resource to help you stop smoking is the [facility] – phone number [phone].). Continue to follow with your primary physician or cardiologist.

## 2025-02-21 ENCOUNTER — NON-APPOINTMENT (OUTPATIENT)
Age: 85
End: 2025-02-21

## 2025-02-21 VITALS
TEMPERATURE: 97 F | OXYGEN SATURATION: 98 % | SYSTOLIC BLOOD PRESSURE: 113 MMHG | DIASTOLIC BLOOD PRESSURE: 62 MMHG | HEART RATE: 58 BPM | RESPIRATION RATE: 18 BRPM

## 2025-02-21 PROBLEM — K92.2 GASTROINTESTINAL HEMORRHAGE, UNSPECIFIED: Chronic | Status: ACTIVE | Noted: 2025-02-13

## 2025-02-21 PROBLEM — I48.91 UNSPECIFIED ATRIAL FIBRILLATION: Chronic | Status: ACTIVE | Noted: 2025-02-13

## 2025-02-21 LAB
ANION GAP SERPL CALC-SCNC: 10 MMOL/L — SIGNIFICANT CHANGE UP (ref 5–17)
BUN SERPL-MCNC: 31 MG/DL — HIGH (ref 7–23)
CALCIUM SERPL-MCNC: 8.6 MG/DL — SIGNIFICANT CHANGE UP (ref 8.4–10.5)
CHLORIDE SERPL-SCNC: 106 MMOL/L — SIGNIFICANT CHANGE UP (ref 96–108)
CO2 SERPL-SCNC: 21 MMOL/L — LOW (ref 22–31)
CREAT SERPL-MCNC: 1.14 MG/DL — SIGNIFICANT CHANGE UP (ref 0.5–1.3)
EGFR: 63 ML/MIN/1.73M2 — SIGNIFICANT CHANGE UP
GLUCOSE SERPL-MCNC: 100 MG/DL — HIGH (ref 70–99)
HCT VFR BLD CALC: 25.9 % — LOW (ref 39–50)
HGB BLD-MCNC: 8 G/DL — LOW (ref 13–17)
MAGNESIUM SERPL-MCNC: 1.9 MG/DL — SIGNIFICANT CHANGE UP (ref 1.6–2.6)
MCHC RBC-ENTMCNC: 26.8 PG — LOW (ref 27–34)
MCHC RBC-ENTMCNC: 30.9 G/DL — LOW (ref 32–36)
MCV RBC AUTO: 86.6 FL — SIGNIFICANT CHANGE UP (ref 80–100)
NRBC BLD AUTO-RTO: 0 /100 WBCS — SIGNIFICANT CHANGE UP (ref 0–0)
PLATELET # BLD AUTO: 162 K/UL — SIGNIFICANT CHANGE UP (ref 150–400)
POTASSIUM SERPL-MCNC: 4.9 MMOL/L — SIGNIFICANT CHANGE UP (ref 3.5–5.3)
POTASSIUM SERPL-SCNC: 4.9 MMOL/L — SIGNIFICANT CHANGE UP (ref 3.5–5.3)
RBC # BLD: 2.99 M/UL — LOW (ref 4.2–5.8)
RBC # FLD: 16.3 % — HIGH (ref 10.3–14.5)
SODIUM SERPL-SCNC: 137 MMOL/L — SIGNIFICANT CHANGE UP (ref 135–145)
WBC # BLD: 8.29 K/UL — SIGNIFICANT CHANGE UP (ref 3.8–10.5)
WBC # FLD AUTO: 8.29 K/UL — SIGNIFICANT CHANGE UP (ref 3.8–10.5)

## 2025-02-21 PROCEDURE — C1887: CPT

## 2025-02-21 PROCEDURE — 74176 CT ABD & PELVIS W/O CONTRAST: CPT | Mod: MC

## 2025-02-21 PROCEDURE — 36415 COLL VENOUS BLD VENIPUNCTURE: CPT

## 2025-02-21 PROCEDURE — 85027 COMPLETE CBC AUTOMATED: CPT

## 2025-02-21 PROCEDURE — 99232 SBSQ HOSP IP/OBS MODERATE 35: CPT

## 2025-02-21 PROCEDURE — 93308 TTE F-UP OR LMTD: CPT

## 2025-02-21 PROCEDURE — C1760: CPT

## 2025-02-21 PROCEDURE — 76377 3D RENDER W/INTRP POSTPROCES: CPT

## 2025-02-21 PROCEDURE — 71046 X-RAY EXAM CHEST 2 VIEWS: CPT

## 2025-02-21 PROCEDURE — 86900 BLOOD TYPING SEROLOGIC ABO: CPT

## 2025-02-21 PROCEDURE — C1894: CPT

## 2025-02-21 PROCEDURE — 86901 BLOOD TYPING SEROLOGIC RH(D): CPT

## 2025-02-21 PROCEDURE — 86850 RBC ANTIBODY SCREEN: CPT

## 2025-02-21 PROCEDURE — 80048 BASIC METABOLIC PNL TOTAL CA: CPT

## 2025-02-21 PROCEDURE — C1889: CPT

## 2025-02-21 PROCEDURE — C1769: CPT

## 2025-02-21 PROCEDURE — C9399: CPT

## 2025-02-21 PROCEDURE — 93005 ELECTROCARDIOGRAM TRACING: CPT

## 2025-02-21 PROCEDURE — 83735 ASSAY OF MAGNESIUM: CPT

## 2025-02-21 PROCEDURE — 33340 PERQ CLSR TCAT L ATR APNDGE: CPT

## 2025-02-21 PROCEDURE — 93355 ECHO TRANSESOPHAGEAL (TEE): CPT

## 2025-02-21 RX ORDER — AMLODIPINE BESYLATE 5 MG
1 TABLET ORAL
Refills: 0 | DISCHARGE

## 2025-02-21 RX ORDER — MAGNESIUM SULFATE 500 MG/ML
2 SYRINGE (ML) INJECTION ONCE
Refills: 0 | Status: COMPLETED | OUTPATIENT
Start: 2025-02-21 | End: 2025-02-21

## 2025-02-21 RX ORDER — RAMIPRIL 2.5 MG/1
1 CAPSULE ORAL
Refills: 0 | DISCHARGE

## 2025-02-21 RX ADMIN — Medication 81 MILLIGRAM(S): at 06:49

## 2025-02-21 RX ADMIN — CLOPIDOGREL BISULFATE 75 MILLIGRAM(S): 75 TABLET, FILM COATED ORAL at 06:49

## 2025-02-21 RX ADMIN — Medication 40 MILLIGRAM(S): at 06:49

## 2025-02-21 RX ADMIN — Medication 25 GRAM(S): at 06:49

## 2025-02-21 NOTE — PROGRESS NOTE ADULT - ASSESSMENT
HPI: 84M Hx HTN, HLD, Afib, GERD, recent hospitalization for GIB (s/p transfusions & embolization) s/p Watchman on 2/19 via RFV w/ vascade x2    # Afib  2/19 s/p CATARINA occlusion w/ 31mm watchman FLX pro via RFV s/p vascade x2  Right groin site stable; soft, nontender, no hematoma  CXR PA/lat- watchman in place, no PTX  Continue Aspirin 81 mg daily, Plavix 75 mg daily  Monitor telemetry  Keep K > 4.0, Mag > 2.0  Anticipate discharge in AM if site and condition remain stable    # Hypotension  S/p 500cc bolus x2, followed by maintenance IVF 75cc/hr with improvement of SBP 90s, MAP 70s  Limited TTE 2/20: negative for pericardial effusion  CTAP non con ordered to r/o RP bleed- no intraperitoneal or retroperitoneal hematoma  Holding antihypertensives for now- Amlodipine and Lisinopril  Continue to monitor    # HLD  Continue Atorvastatin 10 mg daily  DASH diet    Mateusz Foster AGAP  Ext 1130

## 2025-02-21 NOTE — PROVIDER CONTACT NOTE (OTHER) - REASON
Ectopy on tele, Pt had a 3.3 second pause, Hx of 3.1 pause on 02/20, Cardiac Rhythm roberto carlos down to afib 34

## 2025-02-21 NOTE — CHART NOTE - NSCHARTNOTEFT_GEN_A_CORE
BP trend improved; pt with marked bradycardia with pauses overnight (reviewed by Dr. Santos).  Ambulated and demonstrated chronotropic competence. Remained asymptomatic. Plan to discharge pt home today with appointment for follow up with Dr. García on 2/24/25 at 1130am (Dr. Diaz not available) to check BP and trend H/H. Additional follow up pending clinical course. Follow up with Dr. Santos on 3/11/25.. I reviewed discharge instructions and follow up at length with pt and family - all questions and concerns addressed and they verbalize understanding of discharge plan discussed. On 2/19/25, patient underwent a successful CATARINA occlusion device/watchman (31mm) via right femoral vein. Hemostasis achieved via vascade closure devices per protocol w/o any complications; site stable -- soft, nontender, no hematoma. Intraoperative TTE revealed no pericardial effusion. Post-procedure CXR PA/lat 2/20/25 confirmed well-seated device.   Pt was hypotensive post procedure - TTE negative for effusion and CT abd/pelvis negative for RP bleed.  BP trend improved; pt with marked bradycardia with pauses overnight (reviewed by Dr. Santos).  Ambulated and demonstrated chronotropic competence. Remained asymptomatic.   Plan to discharge pt home today with appointment for follow up with Dr. García on 2/24/25 at 1130am (Dr. Diaz not available) to check BP and trend H/H. Additional follow up pending clinical course. I reviewed discharge instructions and follow up at length with pt and family - all questions and concerns addressed and they verbalize understanding of discharge plan discussed.

## 2025-02-21 NOTE — PROVIDER CONTACT NOTE (OTHER) - ASSESSMENT
VSS. Pt denies CP, SOB, Dizziness, Headache, Nausea, Vomiting, Palpitations, Cardiac rhythm afib 34-50's on tele

## 2025-02-21 NOTE — PROVIDER CONTACT NOTE (OTHER) - SITUATION
Ectopy on tele, Pt had a 3.3 second pause, Hx of 3.1 pause on 02/20, Cardiac Rhythm roberto carlos down to afib 34.

## 2025-02-24 ENCOUNTER — APPOINTMENT (OUTPATIENT)
Dept: CARDIOLOGY | Facility: CLINIC | Age: 85
End: 2025-02-24
Payer: MEDICARE

## 2025-02-24 ENCOUNTER — NON-APPOINTMENT (OUTPATIENT)
Age: 85
End: 2025-02-24

## 2025-02-24 VITALS
WEIGHT: 167 LBS | DIASTOLIC BLOOD PRESSURE: 82 MMHG | SYSTOLIC BLOOD PRESSURE: 145 MMHG | HEIGHT: 69 IN | HEART RATE: 69 BPM | BODY MASS INDEX: 24.73 KG/M2 | OXYGEN SATURATION: 94 %

## 2025-02-24 DIAGNOSIS — I10 ESSENTIAL (PRIMARY) HYPERTENSION: ICD-10-CM

## 2025-02-24 DIAGNOSIS — I48.0 PAROXYSMAL ATRIAL FIBRILLATION: ICD-10-CM

## 2025-02-24 DIAGNOSIS — E78.00 PURE HYPERCHOLESTEROLEMIA, UNSPECIFIED: ICD-10-CM

## 2025-02-24 DIAGNOSIS — Z95.818 PRESENCE OF OTHER CARDIAC IMPLANTS AND GRAFTS: ICD-10-CM

## 2025-02-24 DIAGNOSIS — D62 ACUTE POSTHEMORRHAGIC ANEMIA: ICD-10-CM

## 2025-02-24 PROCEDURE — 99214 OFFICE O/P EST MOD 30 MIN: CPT

## 2025-02-24 PROCEDURE — G2211 COMPLEX E/M VISIT ADD ON: CPT

## 2025-02-25 ENCOUNTER — NON-APPOINTMENT (OUTPATIENT)
Age: 85
End: 2025-02-25

## 2025-02-25 LAB
HCT VFR BLD CALC: 29.8 %
HGB BLD-MCNC: 9.2 G/DL
MCHC RBC-ENTMCNC: 26.7 PG
MCHC RBC-ENTMCNC: 30.9 G/DL
MCV RBC AUTO: 86.6 FL
PLATELET # BLD AUTO: 195 K/UL
RBC # BLD: 3.44 M/UL
RBC # FLD: 16.1 %
WBC # FLD AUTO: 5.62 K/UL

## 2025-03-10 PROBLEM — Z86.79 PERSONAL HISTORY OF OTHER DISEASES OF THE CIRCULATORY SYSTEM: Chronic | Status: ACTIVE | Noted: 2025-02-13

## 2025-03-11 ENCOUNTER — APPOINTMENT (OUTPATIENT)
Dept: ELECTROPHYSIOLOGY | Facility: CLINIC | Age: 85
End: 2025-03-11
Payer: MEDICARE

## 2025-03-11 ENCOUNTER — NON-APPOINTMENT (OUTPATIENT)
Age: 85
End: 2025-03-11

## 2025-03-11 VITALS
WEIGHT: 163 LBS | HEIGHT: 69 IN | SYSTOLIC BLOOD PRESSURE: 145 MMHG | HEART RATE: 52 BPM | BODY MASS INDEX: 24.14 KG/M2 | DIASTOLIC BLOOD PRESSURE: 93 MMHG | OXYGEN SATURATION: 99 %

## 2025-03-11 DIAGNOSIS — I48.0 PAROXYSMAL ATRIAL FIBRILLATION: ICD-10-CM

## 2025-03-11 PROCEDURE — 93000 ELECTROCARDIOGRAM COMPLETE: CPT

## 2025-03-11 PROCEDURE — 99213 OFFICE O/P EST LOW 20 MIN: CPT

## 2025-03-11 RX ORDER — CLOPIDOGREL 75 MG/1
75 TABLET, FILM COATED ORAL
Qty: 30 | Refills: 3 | Status: ACTIVE | COMMUNITY

## 2025-03-26 NOTE — ED PROVIDER NOTE - PROGRESS NOTE ADDITIONAL3
Airway       Patient location during procedure: OR       Procedure Start/Stop Times: 3/26/2025 7:36 AM  Staff -        Anesthesiologist:  Valdo Agustin MD       Resident/Fellow: Kirsten Fontanez DO       Performed By: residentIndications and Patient Condition       Indications for airway management: awais-procedural       Induction type:intravenous       Mask difficulty assessment: 1 - vent by mask    Final Airway Details       Final airway type: endotracheal airway       Successful airway: ETT - single and Oral  Endotracheal Airway Details        ETT size (mm): 5.0       Cuffed: yes       Successful intubation technique: video laryngoscopy       Grade View of Cords: 1       Adjucts: stylet       Position: Left       Measured from: lips       Bite block used: None    Post intubation assessment        Placement verified by: capnometry and chest rise        Number of attempts at approach: 1       Number of other approaches attempted: 0       Secured with: tape       Ease of procedure: easy       Dentition: Intact and Unchanged    Medication(s) Administered   Medication Administration Time: 3/26/2025 7:36 AM         Additional Progress Note...

## 2025-05-19 ENCOUNTER — OUTPATIENT (OUTPATIENT)
Dept: OUTPATIENT SERVICES | Facility: HOSPITAL | Age: 85
LOS: 1 days | End: 2025-05-19
Payer: MEDICARE

## 2025-05-19 ENCOUNTER — RESULT REVIEW (OUTPATIENT)
Age: 85
End: 2025-05-19

## 2025-05-19 ENCOUNTER — APPOINTMENT (OUTPATIENT)
Dept: CV DIAGNOSITCS | Facility: HOSPITAL | Age: 85
End: 2025-05-19

## 2025-05-19 VITALS
DIASTOLIC BLOOD PRESSURE: 61 MMHG | SYSTOLIC BLOOD PRESSURE: 115 MMHG | RESPIRATION RATE: 16 BRPM | OXYGEN SATURATION: 95 % | TEMPERATURE: 98 F | HEART RATE: 56 BPM

## 2025-05-19 VITALS
RESPIRATION RATE: 18 BRPM | OXYGEN SATURATION: 95 % | SYSTOLIC BLOOD PRESSURE: 115 MMHG | DIASTOLIC BLOOD PRESSURE: 68 MMHG | HEART RATE: 54 BPM

## 2025-05-19 DIAGNOSIS — Z95.818 PRESENCE OF OTHER CARDIAC IMPLANTS AND GRAFTS: ICD-10-CM

## 2025-05-19 DIAGNOSIS — Z98.41 CATARACT EXTRACTION STATUS, RIGHT EYE: Chronic | ICD-10-CM

## 2025-05-19 DIAGNOSIS — Z96.643 PRESENCE OF ARTIFICIAL HIP JOINT, BILATERAL: Chronic | ICD-10-CM

## 2025-05-19 DIAGNOSIS — Z90.89 ACQUIRED ABSENCE OF OTHER ORGANS: Chronic | ICD-10-CM

## 2025-05-19 DIAGNOSIS — Z90.49 ACQUIRED ABSENCE OF OTHER SPECIFIED PARTS OF DIGESTIVE TRACT: Chronic | ICD-10-CM

## 2025-05-19 PROCEDURE — 93320 DOPPLER ECHO COMPLETE: CPT

## 2025-05-19 PROCEDURE — 93312 ECHO TRANSESOPHAGEAL: CPT

## 2025-05-19 PROCEDURE — 93325 DOPPLER ECHO COLOR FLOW MAPG: CPT

## 2025-05-19 PROCEDURE — 93312 ECHO TRANSESOPHAGEAL: CPT | Mod: 26

## 2025-05-19 PROCEDURE — 93325 DOPPLER ECHO COLOR FLOW MAPG: CPT | Mod: 26

## 2025-05-19 PROCEDURE — 76376 3D RENDER W/INTRP POSTPROCES: CPT | Mod: 26

## 2025-05-19 PROCEDURE — 76376 3D RENDER W/INTRP POSTPROCES: CPT

## 2025-05-19 PROCEDURE — 93320 DOPPLER ECHO COMPLETE: CPT | Mod: 26

## 2025-05-19 NOTE — PRE-ANESTHESIA EVALUATION ADULT - NSANTHAPLANRD_GEN_ALL_CORE
Physical Therapy    Physical Therapy Lumbar Spine Evaluation    Patient Name: Vitaly Puri  MRN: [id number]  Today's Date: 1/15/2025  Time Calculation  Start Time: 0915  Stop Time: 1000  Time Calculation (min): 45 min  PT Evaluation Time Entry  PT Evaluation (Low) Time Entry: 35  PT Therapeutic Procedures Time Entry  Therapeutic Exercise Time Entry: 10  Payor: [employer] [facility] / Plan: [place] [employer] / Product Type: *No Product type* /     Reason for Referral: B knee pain, OA  Referred By: Justen  General Comment: Visit 1 of [place]    Current Problem  Problem List Items Addressed This Visit             ICD-10-CM    Chronic pain of both knees M25.561, M25.562, G89.29    Relevant Orders    Follow Up In Physical Therapy     Other Visit Diagnoses         Codes    Difficulty walking    -  Primary R26.2    Relevant Orders    Follow Up In Physical Therapy    Lumbar radiculopathy     M54.16    Relevant Orders    Follow Up In Physical Therapy           Precautions  Precautions  STEADI Fall Risk Score (The score of 4 or more indicates an increased risk of falling): 0  Precautions Comment: none     Pain  Pain Assessment: 0-10  0-10 (Numeric) Pain Score: 1 (6/10 at worst in B knees. 3/10 at worst low back)  Pain Location: Knee  Pain Orientation: Right, Left  Pain Descriptors: Tightness, Aching, Throbbing  Pain Frequency: Intermittent  Pain Onset: Ongoing  Clinical Progression: Gradually worsening    SUBJECTIVE:   Chief complaint:  Pt reports he has had a hx of B knee pain for several years but notes recently went for cortisone injections and feels did not help. Notes shortly after cortisone, he started to have low back pain. Notes more recently less back pain but more stiffness/and tightness. Notes knee pain is worse with weight bearing and activity, better at rest. Denies pain radiation from low back in to the LE's. Denies numbness and tingling in the LE's.     Imaging: x-rays August 2024 showed B knee OA medial  compartments, DD of lumbar spine and facet arthrosis     Pain Better: rest and sitting for the knees, laying flat on back for low back pain.     Pain Worse: pain worse with activity and weight bearing B knees.     Prior level of function: previously independent with all activity including weight bearing prolonged.    Current limitations: prolonged standing/sitting, stairs, squatting.     Home setup: reviewed and no concern     Work: part time-    Patients goal:relieve knee pain and strengthen back     Prior tx: cortisone injection B knee about 14 week ago    Medical hx has been reviewed with the patient.     Objective:    Lower Extremity ROM: WNL unless documented below:  Date: eval RIGHT LEFT   Hip Flexion     Hip abd     Hip add     Hip ext     Knee Extension -3 -10   Knee Flexion  133   Ankle DF     Ankle PF       Lower Extremity Strength:  Date: eval Myotome RIGHT LEFT   Hip Flexion L1,2 4+ 4+   Hip ext  4+ 4+   Hip abd  4+ 4+   Hip add  4+ 4+   Knee Extension L3 4 4   Knee Flexion  4 4   Ankle DF L4 4+ 4+   Ankle PF S1 4+ 4+     Lumbar ROM:  Date: eval Percentage    Flexion 75%    Extension 50%     RIGHT LEFT   Side Bend 50% 50%   Rotation 50% 50%     Joint mobility: Patella all ranges mildly hypomobile  Posture: Flattened lumbar spine, flexed knee while standing L > R  Palpation: no significant tenderness.     Special tests:  Lumbar: (-) SLR but significant HS tightness   Harper Repeated Movements: all negative    Other Measures  Lower Extremity Funtional Score (LEFS): 36/80     TREATMENT:  Eval  2. Instruction in HEP:  Access Code: [email or web address]  URL: [email or web address]  Date: 01/15/2025  Prepared by:  Katalina    Exercises  - Seated Flexion Stretch  - 1 x daily - 7 x weekly - 2 sets - 10 reps  - Supine Lower Trunk Rotation  - 1 x daily - 7 x weekly - 2 sets - 10 reps  - Seated Hamstring Stretch  - 1 x daily - 7 x weekly - 1 sets - 3 reps - 30 sec hold  - Supine Quad Set  - 1 x  daily - 7 x weekly - 2 sets - 10 reps  - Active Straight Leg Raise with Quad Set (Mirrored)  - 1 x daily - 7 x weekly - 2 sets - 10 reps  - Supine Hip Adduction Isometric with Ball  - 1 x daily - 7 x weekly - 2 sets - 10 reps - 5 sec hold  - Standing Gastroc Stretch at Counter  - 1 x daily - 7 x weekly - 1 sets - 3 reps - 30 sec hold    ASSESSEMENT  Pt is a 69 y.o. referred to therapy for dx of LBP and B knee pain by Gilmar Campuzano DO . Pt presents with pain, loss of motion, loss of strength, reduced posture and reduced function. Pt with signs and symptoms associated with pain of a possible degenerative change in the spine and B knees.  This pt would benefit from a therapy program to restore prior level of function, decrease pain, increase AROM, increase strength and improve posture.  Complexity: Low  Clinical presentation: Stable and/or uncomplicated characteristics  The physical therapy prognosis is good for the patient to achieve their goals.   The pt tolerated therapy treatment today well with no adverse effects.  Personal Factors/Barriers to therapy include:  none       PLAN  The pt will be seen 2 days a week for 6 weeks.      The pt has been educated about the risks and benefits of physical therapy including manual therapy treatments. Pt agrees with POC and gives consent for treatment.     The patient will benefit from physical therapy treatment to include: therapeutic exercises, therapeutic activities, neurological re-education, manual therapy, modalities, and a home exercise program.     Goals:  Active       PT Problem       Reduce pain at worst to 3-4/10 with all prior activity.        Start:  01/15/25    Expected End:  02/05/25            Pt to sit with good posture approx 50-75% of the time.        Start:  01/15/25    Expected End:  02/05/25            Reduce pain at worst to 1-2/10 with all prior activity.        Start:  01/15/25    Expected End:  02/26/25            Increase knee flexion to 140  degrees and ext to 0 R LE, -5 L LE degrees for gait, stairs, and ADL's.        Start:  01/15/25    Expected End:  02/26/25            Pt to increase LE strength to grossly 5/5 for improved gait, stairs, lifting and ADL's.        Start:  01/15/25    Expected End:  02/26/25            Pt to score an increase of 10 points or > on LEFS to display overall increased function.         Start:  01/15/25    Expected End:  02/26/25            Pt to be independent with a HEP for self management.        Start:  01/15/25    Expected End:  02/26/25                monitored anesthesia care (MAC)

## 2025-05-19 NOTE — PACU DISCHARGE NOTE - NSCLINEINSERTRD_GEN_ALL_CORE
DVT: Lovenox 40  Dispo: Pending PT    DNR/DNI, PAULIE filled out  discussed management plan with pts family DVT: Lovenox 40  Dispo: Pending PT    DNR/DNI, PAULIE filled out  discussed management plan with pts family No DVT: Lovenox 40  Dispo: Pending PT    DNR/DNI, PAULIE filled out  discussed management plan with pts family DVT: Lovenox 40  Dispo: Pending PT    DNR/DNI, PAULIE filled out  discussed management plan with pts family DVT: Lovenox 40  Dispo: Pending PT    DNR/DNI, PAULIE filled out  discussed management plan with pts family DVT: Lovenox 40  Dispo: Pending PT    DNR/DNI, PAULIE filled out  discussed management plan with pts family DVT: Lovenox 40  Dispo: Pending PT    DNR/DNI, PAULIE filled out  discussed management plan with pts family

## 2025-05-27 ENCOUNTER — NON-APPOINTMENT (OUTPATIENT)
Age: 85
End: 2025-05-27

## 2025-06-16 ENCOUNTER — APPOINTMENT (OUTPATIENT)
Dept: INTERNAL MEDICINE | Facility: CLINIC | Age: 85
End: 2025-06-16
Payer: MEDICARE

## 2025-06-16 VITALS — TEMPERATURE: 98.1 F

## 2025-06-16 VITALS
BODY MASS INDEX: 23.99 KG/M2 | OXYGEN SATURATION: 98 % | SYSTOLIC BLOOD PRESSURE: 185 MMHG | HEIGHT: 69 IN | WEIGHT: 162 LBS | HEART RATE: 73 BPM | DIASTOLIC BLOOD PRESSURE: 94 MMHG

## 2025-06-16 PROCEDURE — G2211 COMPLEX E/M VISIT ADD ON: CPT

## 2025-06-16 PROCEDURE — 99215 OFFICE O/P EST HI 40 MIN: CPT

## 2025-06-16 RX ORDER — AMOXICILLIN AND CLAVULANATE POTASSIUM 875; 125 MG/1; MG/1
875-125 TABLET, COATED ORAL
Qty: 14 | Refills: 0 | Status: ACTIVE | COMMUNITY
Start: 2025-06-16 | End: 1900-01-01

## 2025-06-16 RX ORDER — PREDNISONE 20 MG/1
20 TABLET ORAL
Qty: 15 | Refills: 0 | Status: ACTIVE | COMMUNITY
Start: 2025-06-16 | End: 1900-01-01

## 2025-06-17 LAB
RESP PATH DNA+RNA PNL NPH NAA+NON-PROBE: DETECTED
RV+EV RNA NPH QL NAA+NON-PROBE: DETECTED
SARS-COV-2 RNA RESP QL NAA+PROBE: NOT DETECTED

## 2025-07-01 ENCOUNTER — APPOINTMENT (OUTPATIENT)
Dept: INTERNAL MEDICINE | Facility: CLINIC | Age: 85
End: 2025-07-01
Payer: MEDICARE

## 2025-07-01 ENCOUNTER — LABORATORY RESULT (OUTPATIENT)
Age: 85
End: 2025-07-01

## 2025-07-01 PROCEDURE — 36415 COLL VENOUS BLD VENIPUNCTURE: CPT

## 2025-07-08 ENCOUNTER — APPOINTMENT (OUTPATIENT)
Dept: INTERNAL MEDICINE | Facility: CLINIC | Age: 85
End: 2025-07-08
Payer: MEDICARE

## 2025-07-08 VITALS — OXYGEN SATURATION: 96 % | HEART RATE: 72 BPM | BODY MASS INDEX: 23.99 KG/M2 | WEIGHT: 162 LBS | HEIGHT: 69 IN

## 2025-07-08 VITALS — DIASTOLIC BLOOD PRESSURE: 87 MMHG | SYSTOLIC BLOOD PRESSURE: 169 MMHG

## 2025-07-08 PROCEDURE — 99215 OFFICE O/P EST HI 40 MIN: CPT

## 2025-07-08 PROCEDURE — G2211 COMPLEX E/M VISIT ADD ON: CPT

## 2025-08-28 ENCOUNTER — OUTPATIENT (OUTPATIENT)
Dept: OUTPATIENT SERVICES | Facility: HOSPITAL | Age: 85
LOS: 1 days | End: 2025-08-28
Payer: MEDICARE

## 2025-08-28 ENCOUNTER — APPOINTMENT (OUTPATIENT)
Dept: ULTRASOUND IMAGING | Facility: CLINIC | Age: 85
End: 2025-08-28
Payer: MEDICARE

## 2025-08-28 ENCOUNTER — APPOINTMENT (OUTPATIENT)
Dept: ORTHOPEDIC SURGERY | Facility: CLINIC | Age: 85
End: 2025-08-28
Payer: MEDICARE

## 2025-08-28 DIAGNOSIS — S86.119A STRAIN OF OTHER MUSCLE(S) AND TENDON(S) OF POSTERIOR MUSCLE GROUP AT LOWER LEG LEVEL, UNSPECIFIED LEG, INITIAL ENCOUNTER: ICD-10-CM

## 2025-08-28 DIAGNOSIS — Z96.643 PRESENCE OF ARTIFICIAL HIP JOINT, BILATERAL: Chronic | ICD-10-CM

## 2025-08-28 DIAGNOSIS — Z90.89 ACQUIRED ABSENCE OF OTHER ORGANS: Chronic | ICD-10-CM

## 2025-08-28 DIAGNOSIS — Z98.41 CATARACT EXTRACTION STATUS, RIGHT EYE: Chronic | ICD-10-CM

## 2025-08-28 DIAGNOSIS — Z90.49 ACQUIRED ABSENCE OF OTHER SPECIFIED PARTS OF DIGESTIVE TRACT: Chronic | ICD-10-CM

## 2025-08-28 PROCEDURE — 99214 OFFICE O/P EST MOD 30 MIN: CPT

## 2025-08-28 PROCEDURE — 93971 EXTREMITY STUDY: CPT

## 2025-08-28 PROCEDURE — 73564 X-RAY EXAM KNEE 4 OR MORE: CPT | Mod: RT

## 2025-08-28 PROCEDURE — 93971 EXTREMITY STUDY: CPT | Mod: 26,RT

## 2025-09-08 ENCOUNTER — NON-APPOINTMENT (OUTPATIENT)
Age: 85
End: 2025-09-08

## 2025-09-08 ENCOUNTER — APPOINTMENT (OUTPATIENT)
Dept: CARDIOLOGY | Facility: CLINIC | Age: 85
End: 2025-09-08
Payer: MEDICARE

## 2025-09-08 VITALS
SYSTOLIC BLOOD PRESSURE: 140 MMHG | HEIGHT: 69 IN | WEIGHT: 163 LBS | BODY MASS INDEX: 24.14 KG/M2 | OXYGEN SATURATION: 97 % | DIASTOLIC BLOOD PRESSURE: 78 MMHG | HEART RATE: 70 BPM

## 2025-09-08 DIAGNOSIS — I48.0 PAROXYSMAL ATRIAL FIBRILLATION: ICD-10-CM

## 2025-09-08 DIAGNOSIS — Z00.00 ENCOUNTER FOR GENERAL ADULT MEDICAL EXAMINATION W/OUT ABNORMAL FINDINGS: ICD-10-CM

## 2025-09-08 DIAGNOSIS — E78.00 PURE HYPERCHOLESTEROLEMIA, UNSPECIFIED: ICD-10-CM

## 2025-09-08 DIAGNOSIS — I10 ESSENTIAL (PRIMARY) HYPERTENSION: ICD-10-CM

## 2025-09-08 PROCEDURE — G2211 COMPLEX E/M VISIT ADD ON: CPT

## 2025-09-08 PROCEDURE — 99214 OFFICE O/P EST MOD 30 MIN: CPT

## 2025-09-08 PROCEDURE — 93000 ELECTROCARDIOGRAM COMPLETE: CPT

## 2025-09-16 ENCOUNTER — APPOINTMENT (OUTPATIENT)
Dept: MRI IMAGING | Facility: CLINIC | Age: 85
End: 2025-09-16
Payer: MEDICARE

## 2025-09-16 PROCEDURE — 70544 MR ANGIOGRAPHY HEAD W/O DYE: CPT | Mod: 26

## (undated) DEVICE — BIOPSY FORCEP RADIAL JAW 4 STANDARD WITH NEEDLE

## (undated) DEVICE — TUBING SUCTION 20FT

## (undated) DEVICE — SENSOR O2 FINGER ADULT

## (undated) DEVICE — CATH IV SAFE BC 22G X 1" (BLUE)

## (undated) DEVICE — SYR LUER LOK 10CC

## (undated) DEVICE — SYR ALLIANCE II INFLATION 60ML

## (undated) DEVICE — TUBING IV SET GRAVITY 3Y 100" MACRO

## (undated) DEVICE — CATH ELECHMSTAT  INJ 7FR 210CM

## (undated) DEVICE — SOL INJ NS 0.9% 500ML 2 PORT

## (undated) DEVICE — FOLEY HOLDER STATLOCK 2 WAY ADULT

## (undated) DEVICE — SYR IV FLUSH SALINE 10ML 30/TY

## (undated) DEVICE — PACK IV START WITH CHG

## (undated) DEVICE — CATH BLLN ULTRASONIC ENSOSCOPE

## (undated) DEVICE — BALLOON US ENDO

## (undated) DEVICE — CATH IV SAFE BC 20G X 1.16" (PINK)

## (undated) DEVICE — BITE BLOCK ADULT 20 X 27MM (GREEN)

## (undated) DEVICE — IRRIGATOR BIO SHIELD

## (undated) DEVICE — TUBING SUCTION CONN 6FT STERILE

## (undated) DEVICE — FORCEP RADIAL JAW 4 JUMBO 2.8MM 3.2MM 240CM ORANGE DISP

## (undated) DEVICE — SUCTION YANKAUER NO CONTROL VENT

## (undated) DEVICE — TUBE VENOUS BLOOD COLLECTION LIGHT GREEN TOP